# Patient Record
Sex: MALE | Race: WHITE | Employment: OTHER | URBAN - METROPOLITAN AREA
[De-identification: names, ages, dates, MRNs, and addresses within clinical notes are randomized per-mention and may not be internally consistent; named-entity substitution may affect disease eponyms.]

---

## 2017-01-26 ENCOUNTER — OFFICE VISIT (OUTPATIENT)
Dept: INTERNAL MEDICINE CLINIC | Age: 72
End: 2017-01-26

## 2017-01-26 VITALS
RESPIRATION RATE: 14 BRPM | OXYGEN SATURATION: 97 % | WEIGHT: 176 LBS | HEART RATE: 71 BPM | DIASTOLIC BLOOD PRESSURE: 79 MMHG | TEMPERATURE: 97.2 F | HEIGHT: 70 IN | SYSTOLIC BLOOD PRESSURE: 142 MMHG | BODY MASS INDEX: 25.2 KG/M2

## 2017-01-26 DIAGNOSIS — S86.112A GASTROCNEMIUS MUSCLE STRAIN, LEFT, INITIAL ENCOUNTER: Primary | ICD-10-CM

## 2017-01-26 NOTE — PROGRESS NOTES
Reviewed record In preparation for visit and have obtained necessary documentation. Advanced directive / living will on file. 1. Have you been to the ER, urgent care clinic or hospitalized since your last visit? No     2. Have you seen or consulted any other health care providers outside of the [address] [place] [address] since your last visit? Include any pap smears or colon screening.  No    Health Maintenance reviewed:

## 2017-01-26 NOTE — PROGRESS NOTES
HISTORY OF PRESENT ILLNESS  Audra Jacques is a 70 y.o. male. He is accompanied by his wife. HPI   He presents complaining of pain in the left posterior upper calf and knee. Pain is described as a constant ache that is 4/10 intensity. The pain began yesterday after spending most of the day repeatedly climbing up and down a 6 foot ladder. Pain kept him awake last night he denies any bruising or swelling. There is no tingling or numbness in the leg. He has noticed no rash or bruising. He has done nothing particular to treat this. 2 weeks ago felll off of a scaffold about 4 ft high. Landed on feet, but knees bucked and feel onto knees with legs bent and feet under legs. He had discomfort in both lower legs. This had improved until work on the ladder yesterday.       Patient Active Problem List   Diagnosis Code    Hypercholesterolemia E78.00    Hypertension, essential I10    S/P colonoscopy A80.044    ED (erectile dysfunction) N52.9    Actinic keratosis L57.0    COPD, moderate ([address] J44.9     Past Medical History   Diagnosis Date    Arthritis      knee    Cancer ([address]      skin    Chronic obstructive pulmonary disease ([address]     Hypercholesterolemia     Hypertension     Ill-defined condition      lipids    Smoking 1/22/2010     Stopped 04/2000      Past Surgical History   Procedure Laterality Date    Endoscopy, colon, diagnostic  07/16/2007     Dr Deacon Francisco polyp repeat 07/2010    Colorectal scrn; hi risk ind  1/12/2016          Hx orthopaedic Right 3/2016     arthroscopic; torn medial meniscus     Social History     Social History    Marital status:      Spouse name: N/A    Number of children: 3    Years of education: N/A     Occupational History    construction      Social History Main Topics    Smoking status: Former Smoker     Packs/day: 2.00     Years: 40.00     Quit date: 4/1/2000    Smokeless tobacco: Never Used    Alcohol use 0.0 oz/week     2 - 3 Cans of beer per week    Drug use: No    Sexual activity: Not Asked     Other Topics Concern    None     Social History Narrative     Family History   Problem Relation Age of Onset    Hypertension Mother     Thyroid Disease Mother     Kidney Disease Father     Cancer Father      skin    Diabetes Father     Cancer Paternal Aunt      lung    Cancer Paternal Uncle      skin     Allergies   Allergen Reactions    Macrobid [Nitrofurantoin Monohyd/M-Cryst] Unknown (comments)    Tetracycline Unknown (comments)     Current Outpatient Prescriptions   Medication Sig Dispense Refill    tadalafil (CIALIS) 5 mg tablet TAKE 1 TABLET AS NEEDED 30 Tab 5    pravastatin (PRAVACHOL) 40 mg tablet TAKE 1 TABLET DAILY 90 Tab 3    albuterol (PROVENTIL HFA, VENTOLIN HFA, PROAIR HFA) 90 mcg/actuation inhaler Take 2 Puffs by inhalation every four (4) hours as needed for Wheezing. 1 Inhaler 1    lisinopril (PRINIVIL, ZESTRIL) 20 mg tablet TAKE 1 TABLET DAILY 90 Tab 2             ROS  Visit Vitals    /79 (BP 1 Location: Right arm, BP Patient Position: Sitting)    Pulse 71    Temp 97.2 °F (36.2 °C) (Oral)    Resp 14    Ht 5' 10\" (1.778 m)    Wt 176 lb (79.8 kg)    SpO2 97%    BMI 25.25 kg/m2     Physical Exam   Constitutional: He is oriented to person, place, and time. He appears well-developed and well-nourished. HENT:   Head: Normocephalic and atraumatic. Cardiovascular:   Pulses:       Dorsalis pedis pulses are 2+ on the right side, and 2+ on the left side. Posterior tibial pulses are 2+ on the right side, and 2+ on the left side. Musculoskeletal:        Left knee: He exhibits normal range of motion, no swelling, no effusion and no erythema. Tenderness: popliteal fossa. Neurological: He is alert and oriented to person, place, and time. Skin: Skin is warm and dry. No rash noted. No erythema. Psychiatric: He has a normal mood and affect. His behavior is normal.   Nursing note and vitals reviewed.       ASSESSMENT and PLAN    ICD-10-CM ICD-9-CM    1. Gastrocnemius muscle strain, left, initial encounter S86.112A 844.8          Gastrocnemius muscle strain, left, initial encounter  Recommended ice packs 3 or 4 times a day for 3 days, then ice or heat as needed after that. Avoid ladders, scaffolds, and other strenuous activity with the leg for at least 2 weeks, longer if pain persists. Pain may take 4-6 weeks to resolve. Follow-up Disposition:  Return if symptoms worsen or fail to improve. I have discussed the diagnosis with the patient and the intended plan as seen in the above orders. Patient is in agreement. The patient has received an after-visit summary and questions were answered concerning future plans. I have discussed medication side effects and warnings with the patient as well.

## 2017-01-26 NOTE — PATIENT INSTRUCTIONS
Strain or Sprain: Care Instructions  Your Care Instructions    A strain happens when you overstretch, or pull, a muscle. A sprain occurs when you stretch or tear a ligament, the tough tissue that connects one bone to another. These problems can happen when you exercise or lift something or when you are in an accident. Rest and other home care can help strains and sprains heal.  The doctor has checked you carefully, but problems can develop later. If you notice any problems or new symptoms, get medical treatment right away. Follow-up care is a key part of your treatment and safety. Be sure to make and go to all appointments, and call your doctor if you are having problems. It's also a good idea to know your test results and keep a list of the medicines you take. How can you care for yourself at home? · If your doctor gave you a sling, splint, brace, or immobilizer, use it exactly as directed. · Rest the strained or sprained area, and follow your doctor's advice about when you can be active again. · Put ice or a cold pack on the sore area for 10 to 20 minutes at a time to stop swelling. Try this every 1 to 2 hours for 3 days (when you are awake) or until the swelling goes down. Put a thin cloth between the ice pack and your skin. Keep your splint or brace dry. · Prop up a sore arm or leg on a pillow when you ice it or anytime you sit or lie down. Try to keep it higher than the level of your heart. This will help reduce swelling. · Take pain medicines exactly as directed. ¨ If the doctor gave you a prescription medicine for pain, take it as prescribed. ¨ If you are not taking a prescription pain medicine, ask your doctor if you can take an over-the-counter medicine. · Do exercises as directed by your doctor or physical therapist.  · Return to your usual level of activity slowly. · Do not do anything that makes the pain worse. When should you call for help?   Call your doctor now or seek immediate medical care if:  · You have severe or increasing pain. · You have tingling, weakness, or numbness in the area. · The area turns cold or changes color. · Your cast or splint feels too tight. · You have symptoms of a blood clot, such as:  ¨ Pain in your calf, back of the knee, thigh, or groin. ¨ Redness and swelling in your leg or groin. · You cannot move the strained part of your body. Watch closely for changes in your health, and be sure to contact your doctor if:  · You do not get better as expected. Where can you learn more? Go to http://ronald-biju.info/. Enter J621 in the search box to learn more about \"Strain or Sprain: Care Instructions. \"  Current as of: May 23, 2016  Content Version: 11.1  © 9023-1974 [employer]if. Care instructions adapted under license by [employer] (which disclaims liability or warranty for this information). If you have questions about a medical condition or this instruction, always ask your healthcare professional. [address] any warranty or liability for your use of this information.

## 2017-01-26 NOTE — MR AVS SNAPSHOT
Visit Information Date & Time Provider Department Dept. Phone Encounter #  
 1/26/2017  2:30 PM MD Valdez Marcelo [phone] [id number] Follow-up Instructions Return if symptoms worsen or fail to improve. Your Appointments 4/11/2017  8:15 AM  
LAB with LAB [facility] Valdez [facility] Appt Note: fasting lab  
 [address] [address] [phone]  
  
   
 [address] 4/18/2017  8:15 AM  
ROUTINE CARE with MD Valdez Marcelogle [facility]) Appt Note: HTN, Chol, COPD Â Fasting lab one week prior. [address] [address] [phone]  
  
   
 8 [facility] [address] Upcoming Health Maintenance Date Due  
 GLAUCOMA SCREENING Q2Y 10/13/2016 MEDICARE YEARLY EXAM 10/13/2016 DTaP/Tdap/Td series (1 - Tdap) 9/7/2022* COLONOSCOPY 1/12/2021 *Topic was postponed. The date shown is not the original due date. Allergies as of 1/26/2017  Review Complete On: 1/26/2017 By: Katerine Aguilar MD  
  
 Severity Noted Reaction Type Reactions Macrobid [Nitrofurantoin Monohyd/m-cryst]  01/22/2010    Unknown (comments) Tetracycline  01/22/2010    Unknown (comments) Current Immunizations  Reviewed on 1/26/2017 Name Date Influenza High Dose Vaccine PF 10/11/2016, 10/13/2015, 9/17/2014 Influenza Vaccine 12/30/2013 Influenza Vaccine Split 10/8/2012 Influenza Vaccine Whole 10/24/2011 Pneumococcal Conjugate (PCV-13) 3/31/2015 Pneumococcal Vaccine (Unspecified Type) 10/24/2011 TD Vaccine 9/7/2012, 6/1/2007 Zoster 5/28/2009 Reviewed by Ben Lopez LPN on 6/62/6400 at  2:29 PM  
You Were Diagnosed With   
  
 Codes Comments  Gastrocnemius muscle strain, left, initial encounter    -  Primary ICD-10-CM: M73.516C ICD-9-CM: 566. 8 Vitals BP Pulse Temp Resp Height(growth percentile) Weight(growth percentile) 142/79 (BP 1 Location: Right arm, BP Patient Position: Sitting) 71 97.2 °F (36.2 °C) (Oral) 14 5' 10\" (1.778 m) 176 lb (79.8 kg) SpO2 BMI Smoking Status 97% 25.25 kg/m2 Former Smoker BMI and BSA Data Body Mass Index Body Surface Area  
 25.25 kg/m 2 1.99 m 2 Preferred Pharmacy Pharmacy Name Phone [address] [facility] [phone] Your Updated Medication List  
  
   
This list is accurate as of: 1/26/17  3:04 PM.  Always use your most recent med list.  
  
  
  
  
 albuterol 90 mcg/actuation inhaler Commonly known as:  PROVENTIL HFA, VENTOLIN HFA, PROAIR HFA Take 2 Puffs by inhalation every four (4) hours as needed for Wheezing. lisinopril 20 mg tablet Commonly known as:  PRINIVIL, ZESTRIL  
TAKE 1 TABLET DAILY pravastatin 40 mg tablet Commonly known as:  PRAVACHOL  
TAKE 1 TABLET DAILY  
  
 tadalafil 5 mg tablet Commonly known as:  CIALIS  
TAKE 1 TABLET AS NEEDED Follow-up Instructions Return if symptoms worsen or fail to improve. Patient Instructions Strain or Sprain: Care Instructions Your Care Instructions A strain happens when you overstretch, or pull, a muscle. A sprain occurs when you stretch or tear a ligament, the tough tissue that connects one bone to another. These problems can happen when you exercise or lift something or when you are in an accident. Rest and other home care can help strains and sprains heal. 
The doctor has checked you carefully, but problems can develop later. If you notice any problems or new symptoms, get medical treatment right away. Follow-up care is a key part of your treatment and safety.  Be sure to make and go to all appointments, and call your doctor if you are having problems. It's also a good idea to know your test results and keep a list of the medicines you take. How can you care for yourself at home? · If your doctor gave you a sling, splint, brace, or immobilizer, use it exactly as directed. · Rest the strained or sprained area, and follow your doctor's advice about when you can be active again. · Put ice or a cold pack on the sore area for 10 to 20 minutes at a time to stop swelling. Try this every 1 to 2 hours for 3 days (when you are awake) or until the swelling goes down. Put a thin cloth between the ice pack and your skin. Keep your splint or brace dry. · Prop up a sore arm or leg on a pillow when you ice it or anytime you sit or lie down. Try to keep it higher than the level of your heart. This will help reduce swelling. · Take pain medicines exactly as directed. ¨ If the doctor gave you a prescription medicine for pain, take it as prescribed. ¨ If you are not taking a prescription pain medicine, ask your doctor if you can take an over-the-counter medicine. · Do exercises as directed by your doctor or physical therapist. 
· Return to your usual level of activity slowly. · Do not do anything that makes the pain worse. When should you call for help? Call your doctor now or seek immediate medical care if: 
· You have severe or increasing pain. · You have tingling, weakness, or numbness in the area. · The area turns cold or changes color. · Your cast or splint feels too tight. · You have symptoms of a blood clot, such as: 
¨ Pain in your calf, back of the knee, thigh, or groin. ¨ Redness and swelling in your leg or groin. · You cannot move the strained part of your body. Watch closely for changes in your health, and be sure to contact your doctor if: 
· You do not get better as expected. Where can you learn more? Go to http://ronald-biju.info/.  
Enter O434 in the search box to learn more about \"Strain or Sprain: Care Instructions. \" Current as of: May 23, 2016 Content Version: 11.1 © 8629-8570 Lingua.ly, [employer]. Care instructions adapted under license by Quintiles (which disclaims liability or warranty for this information). If you have questions about a medical condition or this instruction, always ask your healthcare professional. [address] any warranty or liability for your use of this information. Introducing [facility] & HEALTH SERVICES! Dear Shae Drop: 
Thank you for requesting a Five minutes account. Our records indicate that you already have an active Five minutes account. You can access your account anytime at [email or web address] Weekly Flower Delivery Did you know that you can access your hospital and ER discharge instructions at any time in Five minutes? You can also review all of your test results from your hospital stay or ER visit. Additional Information If you have questions, please visit the Frequently Asked Questions section of the Five minutes website at [email or web address] Weekly [email or web address] [email or web address]/. Remember, Five minutes is NOT to be used for urgent needs. For medical emergencies, dial 911. Now available from your iPhone and Android! Please provide this summary of care documentation to your next provider. Your primary care clinician is listed as Audria Reasons. If you have any questions after today's visit, please call [phone].

## 2017-02-21 ENCOUNTER — HOSPITAL ENCOUNTER (OUTPATIENT)
Dept: LAB | Age: 72
Discharge: HOME | End: 2017-02-21
Payer: MEDICARE

## 2017-02-21 ENCOUNTER — OFFICE VISIT (OUTPATIENT)
Dept: INTERNAL MEDICINE CLINIC | Age: 72
End: 2017-02-21

## 2017-02-21 VITALS
HEIGHT: 70 IN | TEMPERATURE: 96.7 F | DIASTOLIC BLOOD PRESSURE: 79 MMHG | BODY MASS INDEX: 24.91 KG/M2 | WEIGHT: 174 LBS | OXYGEN SATURATION: 96 % | SYSTOLIC BLOOD PRESSURE: 122 MMHG | RESPIRATION RATE: 14 BRPM | HEART RATE: 69 BPM

## 2017-02-21 DIAGNOSIS — M79.605 PAIN IN BOTH LOWER EXTREMITIES: Primary | ICD-10-CM

## 2017-02-21 DIAGNOSIS — M79.604 PAIN IN BOTH LOWER EXTREMITIES: Primary | ICD-10-CM

## 2017-02-21 DIAGNOSIS — I10 HYPERTENSION, ESSENTIAL: ICD-10-CM

## 2017-02-21 DIAGNOSIS — E55.9 VITAMIN D DEFICIENCY: ICD-10-CM

## 2017-02-21 DIAGNOSIS — Z79.899 ON STATIN THERAPY: ICD-10-CM

## 2017-02-21 PROCEDURE — 86140 C-REACTIVE PROTEIN: CPT

## 2017-02-21 PROCEDURE — 82306 VITAMIN D 25 HYDROXY: CPT

## 2017-02-21 PROCEDURE — 82550 ASSAY OF CK (CPK): CPT

## 2017-02-21 PROCEDURE — 85651 RBC SED RATE NONAUTOMATED: CPT

## 2017-02-21 PROCEDURE — 80048 BASIC METABOLIC PNL TOTAL CA: CPT

## 2017-02-21 PROCEDURE — 36415 COLL VENOUS BLD VENIPUNCTURE: CPT

## 2017-02-21 PROCEDURE — 83735 ASSAY OF MAGNESIUM: CPT

## 2017-02-21 NOTE — MR AVS SNAPSHOT
Visit Information Date & Time Provider Department Dept. Phone Encounter #  
 2/21/2017  3:30 PM [facility] [phone] [id number] Follow-up Instructions Return if symptoms worsen or fail to improve. Your Appointments 4/11/2017  8:15 AM  
LAB with LAB [facility] Lizbeth Hernandez [facility]) Appt Note: fasting lab  
 [address] [address] [phone]  
  
   
 [address] 4/18/2017  8:15 AM  
ROUTINE CARE with MD Lizbeth Monge [facility]) Appt Note: HTN, Chol, COPD Â Fasting lab one week prior. [address] [facility] [address] [address] [phone]  
  
   
 [address] Upcoming Health Maintenance Date Due  
 MEDICARE YEARLY EXAM 10/13/2016 DTaP/Tdap/Td series (1 - Tdap) 9/7/2022* GLAUCOMA SCREENING Q2Y 8/15/2018 COLONOSCOPY 1/12/2021 *Topic was postponed. The date shown is not the original due date. Allergies as of 2/21/2017  Review Complete On: 2/21/2017 By: Yady Ball MD  
  
 Severity Noted Reaction Type Reactions Macrobid [Nitrofurantoin Monohyd/m-cryst]  01/22/2010    Unknown (comments) Tetracycline  01/22/2010    Unknown (comments) Current Immunizations  Reviewed on 2/21/2017 Name Date Influenza High Dose Vaccine PF 10/11/2016, 10/13/2015, 9/17/2014 Influenza Vaccine 12/30/2013 Influenza Vaccine Split 10/8/2012 Influenza Vaccine Whole 10/24/2011 Pneumococcal Conjugate (PCV-13) 3/31/2015 Pneumococcal Vaccine (Unspecified Type) 10/24/2011 TD Vaccine 9/7/2012, 6/1/2007 Zoster 5/28/2009 Reviewed by Damion Musa LPN on 0/43/6431 at  3:35 PM  
You Were Diagnosed With   
  
 Codes Comments Pain in both lower extremities    -  Primary ICD-10-CM: M79.604, M79.605 ICD-9-CM: 729.5 On statin therapy     ICD-10-CM: Z79.899 ICD-9-CM: V58.69 Hypertension, essential     ICD-10-CM: I10 
ICD-9-CM: 401.9 Vitamin D deficiency     ICD-10-CM: E55.9 ICD-9-CM: 268.9 Vitals BP Pulse Temp Resp Height(growth percentile) Weight(growth percentile) 122/79 (BP 1 Location: Left arm, BP Patient Position: Sitting) 69 96.7 °F (35.9 °C) (Oral) 14 5' 10\" (1.778 m) 174 lb (78.9 kg) SpO2 BMI Smoking Status 96% 24.97 kg/m2 Former Smoker BMI and BSA Data Body Mass Index Body Surface Area 24.97 kg/m 2 1.97 m 2 Preferred Pharmacy Pharmacy Name Phone [address] [facility] [phone] Your Updated Medication List  
  
   
This list is accurate as of: 2/21/17  4:14 PM.  Always use your most recent med list.  
  
  
  
  
 albuterol 90 mcg/actuation inhaler Commonly known as:  PROVENTIL HFA, VENTOLIN HFA, PROAIR HFA Take 2 Puffs by inhalation every four (4) hours as needed for Wheezing. lisinopril 20 mg tablet Commonly known as:  PRINIVIL, ZESTRIL  
TAKE 1 TABLET DAILY pravastatin 40 mg tablet Commonly known as:  PRAVACHOL  
TAKE 1 TABLET DAILY  
  
 tadalafil 5 mg tablet Commonly known as:  CIALIS  
TAKE 1 TABLET AS NEEDED We Performed the Following C REACTIVE PROTEIN, QT [95053 CPT(R)] CK Z3244353 CPT(R)] MAGNESIUM R307275 CPT(R)] METABOLIC PANEL, BASIC [65454 CPT(R)] SED RATE (ESR) [id number] CPT(R)] VITAMIN D, 25 HYDROXY [id number] CPT(R)] Follow-up Instructions Return if symptoms worsen or fail to improve. Patient Instructions Temprorarily stop pravastatin Let me know how pain is in 2 weeks. My Chart Musculoskeletal Pain: Care Instructions Your Care Instructions Different problems with the bones, muscles, nerves, ligaments, and tendons in the body can cause pain. One or more areas of your body may ache or burn. Or they may feel tired, stiff, or sore. The medical term for this type of pain is musculoskeletal pain. It can have many different causes. Sometimes the pain is caused by an injury such as a strain or sprain. Or you might have pain from using one part of your body in the same way over and over again. This is called overuse. In some cases, the cause of the pain is another health problem such as arthritis or fibromyalgia. The doctor will examine you and ask you questions about your health to help find the cause of your pain. Blood tests or imaging tests like an X-ray may also be helpful. But sometimes doctors can't find a cause of the pain. Treatment depends on your symptoms and the cause of the pain, if known. The doctor has checked you carefully, but problems can develop later. If you notice any problems or new symptoms, get medical treatment right away. Follow-up care is a key part of your treatment and safety. Be sure to make and go to all appointments, and call your doctor if you are having problems. It's also a good idea to know your test results and keep a list of the medicines you take. How can you care for yourself at home? · Rest until you feel better. · Do not do anything that makes the pain worse. Return to exercise gradually if you feel better and your doctor says it's okay. · Be safe with medicines. Read and follow all instructions on the label. ¨ If the doctor gave you a prescription medicine for pain, take it as prescribed. ¨ If you are not taking a prescription pain medicine, ask your doctor if you can take an over-the-counter medicine. · Put ice or a cold pack on the area for 10 to 20 minutes at a time to ease pain. Put a thin cloth between the ice and your skin. When should you call for help? Call your doctor now or seek immediate medical care if: 
· You have new pain, or your pain gets worse. · You have new symptoms such as a fever, a rash, or chills. Watch closely for changes in your health, and be sure to contact your doctor if: 
· You do not get better as expected. Where can you learn more? Go to http://ronald-biju.info/. Enter K417 in the search box to learn more about \"Musculoskeletal Pain: Care Instructions. \" Current as of: February 19, 2016 Content Version: 11.1 © 1102-4243 [employer]aming [employer]. Care instructions adapted under license by LiveNinja (which disclaims liability or warranty for this information). If you have questions about a medical condition or this instruction, always ask your healthcare professional. [address] any warranty or liability for your use of this information. Introducing [facility] & HEALTH SERVICES! Dear Juan Medina: 
Thank you for requesting a Cognovant account. Our records indicate that you already have an active Cognovant account. You can access your account anytime at [email or web address] Did you know that you can access your hospital and ER discharge instructions at any time in Cognovant? You can also review all of your test results from your hospital stay or ER visit. Additional Information If you have questions, please visit the Frequently Asked Questions section of the Cognovant website at [email or web address] Remember, Cognovant is NOT to be used for urgent needs. For medical emergencies, dial 911. Now available from your iPhone and Android! Please provide this summary of care documentation to your next provider. Your primary care clinician is listed as Florence Villalta. If you have any questions after today's visit, please call [phone].

## 2017-02-21 NOTE — PROGRESS NOTES
HISTORY OF PRESENT ILLNESS  Bulmaro Montgomery is a 70 y.o. male. HPI Complains of aching pain in ant thighs and post lower legs for 2 weeks. Has been more active, pushing wheel [place], kneeling, bending doing carpentry and gardening. Usually pain lasts only a few days when he becomes more active in the spring. This is longer than he has experienced in the past. He feels stiff after sittng for 90 min. Legs do not feel weak. ASA helps, but only took it a couple of times. Has not taken it in a week. Numbness in toes occurs rarely. He was seen on January 26 here, complaining of pain in the left posterior upper calf and knee. This occurred after repeatedly climbing up and down a 6 foot ladder. 2 weeks prior to that he had fallen off of a 4 foot high scaffold landing on his feet with knees buckled,  falling onto the knees with his legs and feet bent under him. However all of the pain from those incidents had resolved.       Patient Active Problem List   Diagnosis Code    Hypercholesterolemia E78.00    Hypertension, essential I10    S/P colonoscopy O48.987    ED (erectile dysfunction) N52.9    Actinic keratosis L57.0    COPD, moderate ([address] J44.9     Past Medical History   Diagnosis Date    Arthritis      knee    Cancer ([address]      skin    Chronic obstructive pulmonary disease ([address]     Hypercholesterolemia     Hypertension     Ill-defined condition      lipids    Smoking 1/22/2010     Stopped 04/2000      Past Surgical History   Procedure Laterality Date    Endoscopy, colon, diagnostic  07/16/2007     Dr Mecca Cortés polyp repeat 07/2010    Colorectal scrn; hi risk ind  1/12/2016          Hx orthopaedic Right 3/2016     arthroscopic; torn medial meniscus     Social History     Social History    Marital status:      Spouse name: N/A    Number of children: 3    Years of education: N/A     Occupational History    construction      Social History Main Topics    Smoking status: Former Smoker Packs/day: 2.00     Years: 40.00     Quit date: 4/1/2000    Smokeless tobacco: Never Used    Alcohol use 0.0 oz/week     2 - 3 Cans of beer per week    Drug use: No    Sexual activity: Not Asked     Other Topics Concern    None     Social History Narrative     Family History   Problem Relation Age of Onset    Hypertension Mother     Thyroid Disease Mother     Kidney Disease Father     Cancer Father      skin    Diabetes Father     Cancer Paternal Aunt      lung    Cancer Paternal Uncle      skin     Allergies   Allergen Reactions    Macrobid [Nitrofurantoin Monohyd/M-Cryst] Unknown (comments)    Tetracycline Unknown (comments)     Current Outpatient Prescriptions   Medication Sig Dispense Refill    tadalafil (CIALIS) 5 mg tablet TAKE 1 TABLET AS NEEDED 30 Tab 5    pravastatin (PRAVACHOL) 40 mg tablet TAKE 1 TABLET DAILY 90 Tab 3    albuterol (PROVENTIL HFA, VENTOLIN HFA, PROAIR HFA) 90 mcg/actuation inhaler Take 2 Puffs by inhalation every four (4) hours as needed for Wheezing. 1 Inhaler 1    lisinopril (PRINIVIL, ZESTRIL) 20 mg tablet TAKE 1 TABLET DAILY 90 Tab 2             ROS       Visit Vitals    /79 (BP 1 Location: Left arm, BP Patient Position: Sitting)    Pulse 69    Temp 96.7 °F (35.9 °C) (Oral)    Resp 14    Ht 5' 10\" (1.778 m)    Wt 174 lb (78.9 kg)    SpO2 96%    BMI 24.97 kg/m2     Physical Exam   Constitutional: He is oriented to person, place, and time. He appears well-developed and well-nourished. HENT:   Head: Normocephalic and atraumatic. Musculoskeletal:        Right upper leg: He exhibits no tenderness and no swelling. Left upper leg: He exhibits no tenderness and no swelling. Right lower leg: He exhibits no tenderness and no swelling. Left lower leg: He exhibits no tenderness and no swelling. Neurological: He is alert and oriented to person, place, and time.  Gait normal.   Reflex Scores:       Patellar reflexes are 2+ on the right side and 2+ on the left side. Achilles reflexes are 1+ on the right side and 1+ on the left side. Motor strength symmetric and 5/5 to dorsiflexion/plantar flexion of ankle and great toe, flexion/extension of knee, flexion of hip     Skin: Skin is warm, dry and intact. No rash noted. Nursing note and vitals reviewed. ASSESSMENT and PLAN    ICD-10-CM ICD-9-CM    1. Pain in both lower extremities M79.604 729.5 SED RATE (ESR)    M79.605  C REACTIVE PROTEIN, QT   2. On statin therapy Z79.899 V58.69 CK   3. Hypertension, essential I10 401.9 MAGNESIUM      METABOLIC PANEL, BASIC   4. Vitamin D deficiency E55.9 268.9 VITAMIN D, 25 HYDROXY         Pain in both lower extremities  -     SED RATE (ESR)  -     C REACTIVE PROTEIN, QT    On statin therapy  Temprorarily stop pravastatin  Let me know how pain is in 2 weeks in  My Chart  -     CK    Hypertension, essential  -     MAGNESIUM  -     METABOLIC PANEL, BASIC    Vitamin D deficiency  -     VITAMIN D, 25 HYDROXY      Follow-up Disposition:  Return if symptoms worsen or fail to improve. I have discussed the diagnosis with the patient and the intended plan as seen in the above orders. Patient is in agreement. The patient has received an after-visit summary and questions were answered concerning future plans. I have discussed medication side effects and warnings with the patient as well.

## 2017-02-21 NOTE — PATIENT INSTRUCTIONS
Temprorarily stop pravastatin  Let me know how pain is in 2 weeks. My Chart       Musculoskeletal Pain: Care Instructions  Your Care Instructions  Different problems with the bones, muscles, nerves, ligaments, and tendons in the body can cause pain. One or more areas of your body may ache or burn. Or they may feel tired, stiff, or sore. The medical term for this type of pain is musculoskeletal pain. It can have many different causes. Sometimes the pain is caused by an injury such as a strain or sprain. Or you might have pain from using one part of your body in the same way over and over again. This is called overuse. In some cases, the cause of the pain is another health problem such as arthritis or fibromyalgia. The doctor will examine you and ask you questions about your health to help find the cause of your pain. Blood tests or imaging tests like an X-ray may also be helpful. But sometimes doctors can't find a cause of the pain. Treatment depends on your symptoms and the cause of the pain, if known. The doctor has checked you carefully, but problems can develop later. If you notice any problems or new symptoms, get medical treatment right away. Follow-up care is a key part of your treatment and safety. Be sure to make and go to all appointments, and call your doctor if you are having problems. It's also a good idea to know your test results and keep a list of the medicines you take. How can you care for yourself at home? · Rest until you feel better. · Do not do anything that makes the pain worse. Return to exercise gradually if you feel better and your doctor says it's okay. · Be safe with medicines. Read and follow all instructions on the label. ¨ If the doctor gave you a prescription medicine for pain, take it as prescribed. ¨ If you are not taking a prescription pain medicine, ask your doctor if you can take an over-the-counter medicine.   · Put ice or a cold pack on the area for 10 to 20 minutes at a time to ease pain. Put a thin cloth between the ice and your skin. When should you call for help? Call your doctor now or seek immediate medical care if:  · You have new pain, or your pain gets worse. · You have new symptoms such as a fever, a rash, or chills. Watch closely for changes in your health, and be sure to contact your doctor if:  · You do not get better as expected. Where can you learn more? Go to http://ronald-biju.info/. Enter L155 in the search box to learn more about \"Musculoskeletal Pain: Care Instructions. \"  Current as of: February 19, 2016  Content Version: 11.1  © 3907-2037 [employer]. Care instructions adapted under license by [employer] (which disclaims liability or warranty for this information). If you have questions about a medical condition or this instruction, always ask your healthcare professional. [address] any warranty or liability for your use of this information.

## 2017-02-21 NOTE — PROGRESS NOTES
Reviewed record In preparation for visit and have obtained necessary documentation. Advanced directive / living will on file. 1. Have you been to the ER, urgent care clinic or hospitalized since your last visit? No     2. Have you seen or consulted any other health care providers outside of the [facility] since your last visit? Include any pap smears or colon screening.  Dr René Diaz Maintenance reviewed:

## 2017-02-22 LAB
25(OH)D3+25(OH)D2 SERPL-MCNC: 32.9 NG/ML (ref 30–100)
BUN SERPL-MCNC: 15 MG/DL (ref 8–27)
BUN/CREAT SERPL: 13 (ref 10–22)
CALCIUM SERPL-MCNC: 9.6 MG/DL (ref 8.6–10.2)
CHLORIDE SERPL-SCNC: 102 MMOL/L (ref 96–106)
CK SERPL-CCNC: 236 U/L (ref 24–204)
CO2 SERPL-SCNC: 23 MMOL/L (ref 18–29)
CREAT SERPL-MCNC: 1.12 MG/DL (ref 0.76–1.27)
CRP SERPL-MCNC: 0.4 MG/L (ref 0–4.9)
ERYTHROCYTE [SEDIMENTATION RATE] IN BLOOD BY WESTERGREN METHOD: 18 MM/HR (ref 0–30)
GLUCOSE SERPL-MCNC: 85 MG/DL (ref 65–99)
MAGNESIUM SERPL-MCNC: 2.2 MG/DL (ref 1.6–2.3)
POTASSIUM SERPL-SCNC: 4.2 MMOL/L (ref 3.5–5.2)
SODIUM SERPL-SCNC: 139 MMOL/L (ref 134–144)

## 2017-04-11 ENCOUNTER — HOSPITAL ENCOUNTER (OUTPATIENT)
Dept: LAB | Age: 72
Discharge: HOME | End: 2017-04-11
Payer: MEDICARE

## 2017-04-11 ENCOUNTER — APPOINTMENT (OUTPATIENT)
Dept: INTERNAL MEDICINE CLINIC | Age: 72
End: 2017-04-11

## 2017-04-11 DIAGNOSIS — E78.00 HYPERCHOLESTEROLEMIA: ICD-10-CM

## 2017-04-11 DIAGNOSIS — J44.9 COPD, MODERATE (HCC): ICD-10-CM

## 2017-04-11 PROCEDURE — 80053 COMPREHEN METABOLIC PANEL: CPT

## 2017-04-11 PROCEDURE — 36415 COLL VENOUS BLD VENIPUNCTURE: CPT

## 2017-04-11 PROCEDURE — 80061 LIPID PANEL: CPT

## 2017-04-11 PROCEDURE — 85025 COMPLETE CBC W/AUTO DIFF WBC: CPT

## 2017-04-12 LAB
ALBUMIN SERPL-MCNC: 3.9 G/DL (ref 3.5–4.8)
ALBUMIN/GLOB SERPL: 1.4 {RATIO} (ref 1.2–2.2)
ALP SERPL-CCNC: 107 IU/L (ref 39–117)
ALT SERPL-CCNC: 16 IU/L (ref 0–44)
AST SERPL-CCNC: 24 IU/L (ref 0–40)
BASOPHILS # BLD AUTO: 0.1 X10E3/UL (ref 0–0.2)
BASOPHILS NFR BLD AUTO: 1 %
BILIRUB SERPL-MCNC: 0.6 MG/DL (ref 0–1.2)
BUN SERPL-MCNC: 15 MG/DL (ref 8–27)
BUN/CREAT SERPL: 14 (ref 10–24)
CALCIUM SERPL-MCNC: 9.1 MG/DL (ref 8.6–10.2)
CHLORIDE SERPL-SCNC: 103 MMOL/L (ref 96–106)
CHOLEST SERPL-MCNC: 189 MG/DL (ref 100–199)
CO2 SERPL-SCNC: 21 MMOL/L (ref 18–29)
CREAT SERPL-MCNC: 1.06 MG/DL (ref 0.76–1.27)
EOSINOPHIL # BLD AUTO: 0.3 X10E3/UL (ref 0–0.4)
EOSINOPHIL NFR BLD AUTO: 5 %
ERYTHROCYTE [DISTWIDTH] IN BLOOD BY AUTOMATED COUNT: 14.2 % (ref 12.3–15.4)
GLOBULIN SER CALC-MCNC: 2.7 G/DL (ref 1.5–4.5)
GLUCOSE SERPL-MCNC: 91 MG/DL (ref 65–99)
HCT VFR BLD AUTO: 42.9 % (ref 37.5–51)
HDLC SERPL-MCNC: 47 MG/DL
HGB BLD-MCNC: 14 G/DL (ref 12.6–17.7)
IMM GRANULOCYTES # BLD: 0 X10E3/UL (ref 0–0.1)
IMM GRANULOCYTES NFR BLD: 0 %
INTERPRETATION, 910389: NORMAL
LDLC SERPL CALC-MCNC: 127 MG/DL (ref 0–99)
LYMPHOCYTES # BLD AUTO: 1.7 X10E3/UL (ref 0.7–3.1)
LYMPHOCYTES NFR BLD AUTO: 28 %
MCH RBC QN AUTO: 30 PG (ref 26.6–33)
MCHC RBC AUTO-ENTMCNC: 32.6 G/DL (ref 31.5–35.7)
MCV RBC AUTO: 92 FL (ref 79–97)
MONOCYTES # BLD AUTO: 0.5 X10E3/UL (ref 0.1–0.9)
MONOCYTES NFR BLD AUTO: 9 %
NEUTROPHILS # BLD AUTO: 3.6 X10E3/UL (ref 1.4–7)
NEUTROPHILS NFR BLD AUTO: 57 %
PLATELET # BLD AUTO: 245 X10E3/UL (ref 150–379)
POTASSIUM SERPL-SCNC: 4.4 MMOL/L (ref 3.5–5.2)
PROT SERPL-MCNC: 6.6 G/DL (ref 6–8.5)
RBC # BLD AUTO: 4.66 X10E6/UL (ref 4.14–5.8)
SODIUM SERPL-SCNC: 140 MMOL/L (ref 134–144)
TRIGL SERPL-MCNC: 73 MG/DL (ref 0–149)
VLDLC SERPL CALC-MCNC: 15 MG/DL (ref 5–40)
WBC # BLD AUTO: 6.2 X10E3/UL (ref 3.4–10.8)

## 2017-04-18 ENCOUNTER — OFFICE VISIT (OUTPATIENT)
Dept: INTERNAL MEDICINE CLINIC | Age: 72
End: 2017-04-18

## 2017-04-18 VITALS
HEIGHT: 70 IN | HEART RATE: 61 BPM | DIASTOLIC BLOOD PRESSURE: 82 MMHG | SYSTOLIC BLOOD PRESSURE: 124 MMHG | OXYGEN SATURATION: 97 % | RESPIRATION RATE: 16 BRPM | TEMPERATURE: 97.1 F | BODY MASS INDEX: 24.91 KG/M2 | WEIGHT: 174 LBS

## 2017-04-18 DIAGNOSIS — Z71.89 ADVANCE DIRECTIVE DISCUSSED WITH PATIENT: ICD-10-CM

## 2017-04-18 DIAGNOSIS — R29.898 WEAKNESS OF LEFT LEG: ICD-10-CM

## 2017-04-18 DIAGNOSIS — Z00.00 MEDICARE ANNUAL WELLNESS VISIT, SUBSEQUENT: ICD-10-CM

## 2017-04-18 DIAGNOSIS — I10 HYPERTENSION, ESSENTIAL: ICD-10-CM

## 2017-04-18 DIAGNOSIS — J44.9 COPD, MODERATE (HCC): Primary | ICD-10-CM

## 2017-04-18 DIAGNOSIS — M17.0 PRIMARY OSTEOARTHRITIS OF BOTH KNEES: ICD-10-CM

## 2017-04-18 DIAGNOSIS — E78.00 HYPERCHOLESTEROLEMIA: ICD-10-CM

## 2017-04-18 NOTE — PROGRESS NOTES
Reviewed record In preparation for visit and have obtained necessary documentation    1. Have you been to the ER, urgent care clinic since your last visit? Hospitalized since your last visit? NO  2. Have you seen or consulted any other health care providers outside of the [address] [place] [address] since your last visit? Include any pap smears or colon screening.  NO    Patient has advance directive on file

## 2017-04-18 NOTE — PROGRESS NOTES
HISTORY OF PRESENT ILLNESS  Ronald Oliva is a 70 y.o. male. HPI   He presents for follow up of hypertension and hyperlipidemia. Diet and Lifestyle: generally follows a low fat low cholesterol diet, generally follows a low sodium diet, exercises sporadically, nonsmoker   Medication compliance: compliant most of the time  Medication side effects: none  Home BP Monitoring: is not measured at home. Cardiovascular ROS:  He denies palpitations, orthopnea, exertional chest pressure/discomfort, claudication, lower extremity edema, dyspnea on exertion, dizziness    He is being seen for follow up of COPD, not currently in exacerbation. taking medications as instructed, no medication side effects noted, no significant ongoing wheezing or shortness of breath, using bronchodilator MDI less than twice a week.    Uses rescue inhaler:0  times /week        Patient Active Problem List   Diagnosis Code    Hypercholesterolemia E78.00    Hypertension, essential I10    S/P colonoscopy P87.531    ED (erectile dysfunction) N52.9    Actinic keratosis L57.0    COPD, moderate ([address] J44.9     Past Medical History:   Diagnosis Date    Arthritis     knee    Cancer [address]     skin    Chronic obstructive pulmonary disease ([facility]     Hypercholesterolemia     Hypertension     Ill-defined condition     lipids    Smoking 1/22/2010    Stopped 04/2000      Past Surgical History:   Procedure Laterality Date    COLORECTAL SCRN; HI RISK IND  1/12/2016         ENDOSCOPY, COLON, DIAGNOSTIC  07/16/2007    Dr Christensen Has polyp repeat 07/2010    HX ORTHOPAEDIC Right 3/2016    arthroscopic; torn medial meniscus     Social History     Social History    Marital status:      Spouse name: N/A    Number of children: 3    Years of education: N/A     Occupational History    construction      Social History Main Topics    Smoking status: Former Smoker     Packs/day: 2.00     Years: 40.00     Quit date: 4/1/2000    Smokeless tobacco: Never Used    Alcohol use 0.0 oz/week     2 - 3 Cans of beer per week    Drug use: No    Sexual activity: Not Asked     Other Topics Concern    None     Social History Narrative     Family History   Problem Relation Age of Onset    Hypertension Mother     Thyroid Disease Mother     Kidney Disease Father     Cancer Father      skin    Diabetes Father     Cancer Paternal Aunt      lung    Cancer Paternal Uncle      skin     Allergies   Allergen Reactions    Macrobid [Nitrofurantoin Monohyd/M-Cryst] Unknown (comments)    Pravastatin Myalgia    Tetracycline Unknown (comments)     Current Outpatient Prescriptions   Medication Sig Dispense Refill    lisinopril (PRINIVIL, ZESTRIL) 20 mg tablet TAKE 1 TABLET DAILY 90 Tab 3    tadalafil (CIALIS) 5 mg tablet TAKE 1 TABLET AS NEEDED 30 Tab 5    pravastatin (PRAVACHOL) 40 mg tablet TAKE 1 TABLET DAILY 90 Tab 3    albuterol (PROVENTIL HFA, VENTOLIN HFA, PROAIR HFA) 90 mcg/actuation inhaler Take 2 Puffs by inhalation every four (4) hours as needed for Wheezing. 1 Inhaler 1       Review of Systems   Constitutional: Negative for malaise/fatigue and weight loss. Gastrointestinal: Positive for heartburn (rarely). Negative for constipation and diarrhea. Musculoskeletal: Positive for joint pain. Negative for back pain. Left leg feels weak. Had cortisone shot in left knee. No pain when walking, but gets tired is pushing lawnmower. Neurological: Negative for dizziness, tingling and focal weakness. Visit Vitals    /82 (BP 1 Location: Right arm, BP Patient Position: Sitting)    Pulse 61    Temp 97.1 °F (36.2 °C) (Oral)    Resp 16    Ht 5' 10\" (1.778 m)    Wt 174 lb (78.9 kg)    SpO2 97%    BMI 24.97 kg/m2     Physical Exam   Constitutional: He is oriented to person, place, and time. He appears well-developed and well-nourished. HENT:   Head: Normocephalic and atraumatic.    Eyes: Conjunctivae are normal. Pupils are equal, round, and reactive to light.   Neck: Neck supple. Carotid bruit is not present. No thyromegaly present. Cardiovascular: Normal rate, regular rhythm and normal heart sounds. PMI is not displaced. Exam reveals no gallop. No murmur heard. Pulses:       Dorsalis pedis pulses are 2+ on the right side, and 2+ on the left side. Posterior tibial pulses are 2+ on the right side, and 2+ on the left side. Pulmonary/Chest: Effort normal. He has no wheezes. He has no rhonchi. He has no rales. Abdominal: Soft. Normal appearance. He exhibits no abdominal bruit and no mass. There is no hepatosplenomegaly. There is no tenderness. Musculoskeletal: He exhibits no edema. Lymphadenopathy:     He has no cervical adenopathy. Right: No supraclavicular adenopathy present. Left: No inguinal and no supraclavicular adenopathy present. Neurological: He is alert and oriented to person, place, and time. No sensory deficit. Skin: Skin is warm, dry and intact. No rash noted. Psychiatric: He has a normal mood and affect. His behavior is normal.   Nursing note and vitals reviewed. Results for orders placed or performed in visit on 36/19/38   METABOLIC PANEL, COMPREHENSIVE   Result Value Ref Range    Glucose 91 65 - 99 mg/dL    BUN 15 8 - 27 mg/dL    Creatinine 1.06 0.76 - 1.27 mg/dL    GFR est non-AA 70 >59 mL/min/1.73    GFR est AA 81 >59 mL/min/1.73    BUN/Creatinine ratio 14 10 - 24    Sodium 140 134 - 144 mmol/L    Potassium 4.4 3.5 - 5.2 mmol/L    Chloride 103 96 - 106 mmol/L    CO2 21 18 - 29 mmol/L    Calcium 9.1 8.6 - 10.2 mg/dL    Protein, total 6.6 6.0 - 8.5 g/dL    Albumin 3.9 3.5 - 4.8 g/dL    GLOBULIN, TOTAL 2.7 1.5 - 4.5 g/dL    A-G Ratio 1.4 1.2 - 2.2    Bilirubin, total 0.6 0.0 - 1.2 mg/dL    Alk.  phosphatase 107 39 - 117 IU/L    AST (SGOT) 24 0 - 40 IU/L    ALT (SGPT) 16 0 - 44 IU/L   LIPID PANEL   Result Value Ref Range    Cholesterol, total 189 100 - 199 mg/dL    Triglyceride 73 0 - 149 mg/dL    HDL Cholesterol 47 >39 mg/dL    VLDL, calculated 15 5 - 40 mg/dL    LDL, calculated 127 (H) 0 - 99 mg/dL   CBC WITH AUTOMATED DIFF   Result Value Ref Range    WBC 6.2 3.4 - 10.8 x10E3/uL    RBC 4.66 4.14 - 5.80 x10E6/uL    HGB 14.0 12.6 - 17.7 g/dL    HCT 42.9 37.5 - 51.0 %    MCV 92 79 - 97 fL    MCH 30.0 26.6 - 33.0 pg    MCHC 32.6 31.5 - 35.7 g/dL    RDW 14.2 12.3 - 15.4 %    PLATELET 989 482 - 482 x10E3/uL    NEUTROPHILS 57 %    Lymphocytes 28 %    MONOCYTES 9 %    EOSINOPHILS 5 %    BASOPHILS 1 %    ABS. NEUTROPHILS 3.6 1.4 - 7.0 x10E3/uL    Abs Lymphocytes 1.7 0.7 - 3.1 x10E3/uL    ABS. MONOCYTES 0.5 0.1 - 0.9 x10E3/uL    ABS. EOSINOPHILS 0.3 0.0 - 0.4 x10E3/uL    ABS. BASOPHILS 0.1 0.0 - 0.2 x10E3/uL    IMMATURE GRANULOCYTES 0 %    ABS. IMM. GRANS. 0.0 0.0 - 0.1 x10E3/uL   CVD REPORT   Result Value Ref Range    INTERPRETATION Note          ASSESSMENT and PLAN    ICD-10-CM ICD-9-CM    1. COPD, moderate ([facility] J44.9 496    2. Hypercholesterolemia E78.00 272.0    3. Hypertension, essential I10 401.9    4. Primary osteoarthritis of both knees M17.0 715.16 REFERRAL TO PHYSICAL THERAPY   5. Weakness of left leg R29.898 729.89 REFERRAL TO PHYSICAL THERAPY   6. Medicare annual wellness visit, subsequent Z00.00 V70.0    7. Advance directive discussed with patient Z71.89 V65.49          COPD, moderate ([facility]  Stable    Hypercholesterolemia  Hyperlipidemia is borderline controlled.      Hypertension, essential  Hypertension is controlled    Primary osteoarthritis of both knees  -     REFERRAL TO PHYSICAL THERAPY    Weakness of left leg  -     REFERRAL TO PHYSICAL THERAPY    Medicare annual wellness visit, subsequent    Advance directive discussed with patient      Follow-up Disposition:  Return in about 6 months (around 10/18/2017) for HTN, chol, COPD  .  lab results and schedule of future lab studies reviewed with patient  reviewed diet, exercise and weight control  cardiovascular risk and specific lipid/LDL goals reviewed  I have discussed the diagnosis with the patient and the intended plan as seen in the above orders. Patient is in agreement. The patient has received an after-visit summary and questions were answered concerning future plans. I have discussed medication side effects and warnings with the patient as well.

## 2017-04-18 NOTE — PATIENT INSTRUCTIONS
See physical therapy about knees and leg weakness  [facility]   [address] [facility] [place], [address]   Tel: [phone]     [facility]   [address] [facility] [place] [address], [address] [place], [address]   Tel: [phone] Tel: [phone]     Office visit in 6 months     The best way to stay healthy is to live a healthy lifestyle. A healthy lifestyle includes regular exercise, eating a well-balanced diet, keeping a healthy weight and not smoking. Regular physical exams and screening tests are another important way to take care of yourself. Preventive exams provided by health care providers can find health problems early when treatment works best and can keep you from getting certain diseases or illnesses. Preventive services include exams, lab tests, screenings, shots, monitoring and information to help you take care of your own health. All people over 65 should have a pneumonia shot. Pneumonia shots are usually only needed once in a lifetime unless your doctor decides differently. In addition to your physical exam, some screening tests are recommended:    All people over 65 should have a yearly flu shot. People over 65 are at medium to high risk for Hepatitis B. Three shots are needed for complete protection. Bone mass measurement (dexa scan) is recommended every two years. Diabetes Mellitus screening is recommended every year. Glaucoma is an eye disease caused by high pressure in the eye. An eye exam is recommended every year. Cardiovascular screening tests that check your cholesterol and other blood fat (lipid) levels are recommended every five years. Colorectal Cancer screening tests help to find pre-cancerous polyps (growths in the colon) so they can be removed before they turn into cancer. Tests ordered for screening depend on your personal and family history risk factors.     Prostate Cancer Screening (annually up to age 76)    Screening for breast cancer is recommended yearly with a Mammogram.    Screening for cervical and vaginal cancer is recommended with a pelvic and Pap test every two years. However if you have had an abnormal pap in the past  three years or at high risk for cervical or vaginal cancer Medicare will cover a pap test and a pelvic exam every year. Here is a list of your current Health Maintenance items with a due date:  Health Maintenance Due   Topic Date Due    Annual Well Visit  10/13/2016            Well Visit, Over 72: Care Instructions  Your Care Instructions  Physical exams can help you stay healthy. Your doctor has checked your overall health and may have suggested ways to take good care of yourself. He or she also may have recommended tests. At home, you can help prevent illness with healthy eating, regular exercise, and other steps. Follow-up care is a key part of your treatment and safety. Be sure to make and go to all appointments, and call your doctor if you are having problems. It's also a good idea to know your test results and keep a list of the medicines you take. How can you care for yourself at home? · Reach and stay at a healthy weight. This will lower your risk for many problems, such as obesity, diabetes, heart disease, and high blood pressure. · Get at least 30 minutes of exercise on most days of the week. Walking is a good choice. You also may want to do other activities, such as running, swimming, cycling, or playing tennis or team sports. · Do not smoke. Smoking can make health problems worse. If you need help quitting, talk to your doctor about stop-smoking programs and medicines. These can increase your chances of quitting for good. · Protect your skin from too much sun. When you're outdoors from 10 a.m. to 4 p.m., stay in the shade or cover up with clothing and a hat with a wide brim. Wear sunglasses that block UV rays.  Even when it's cloudy, put broad-spectrum sunscreen (SPF 30 or higher) on any exposed skin. · See a dentist one or two times a year for checkups and to have your teeth cleaned. · Wear a seat belt in the car. · Limit alcohol to 2 drinks a day for men and 1 drink a day for women. Too much alcohol can cause health problems. Follow your doctor's advice about when to have certain tests. These tests can spot problems early. For men and women  · Cholesterol. Your doctor will tell you how often to have this done based on your overall health and other things that can increase your risk for heart attack and stroke. · Blood pressure. Have your blood pressure checked during a routine doctor visit. Your doctor will tell you how often to check your blood pressure based on your age, your blood pressure results, and other factors. · Diabetes. Ask your doctor whether you should have tests for diabetes. · Vision. Experts recommend that you have yearly exams for glaucoma and other age-related eye problems. · Hearing. Tell your doctor if you notice any change in your hearing. You can have tests to find out how well you hear. · Colon cancer tests. Keep having colon cancer tests as your doctor recommends. You can have one of several types of tests. · Heart attack and stroke risk. At least every 4 to 6 years, you should have your risk for heart attack and stroke assessed. Your doctor uses factors such as your age, blood pressure, cholesterol, and whether you smoke or have diabetes to show what your risk for a heart attack or stroke is over the next 10 years. · Osteoporosis. Talk to your doctor about whether you should have a bone density test to find out whether you have thinning bones. Also ask your doctor about whether you should take calcium and vitamin D supplements. For women  · Pap test and pelvic exam. You may no longer need a Pap test. Talk with your doctor about whether to stop or continue to have Pap tests.   · Breast exam and mammogram. Ask how often you should have a mammogram, which is an X-ray of your breasts. A mammogram can spot breast cancer before it can be felt and when it is easiest to treat. · Thyroid disease. Talk to your doctor about whether to have your thyroid checked as part of a regular physical exam. Women have an increased chance of a thyroid problem. For men  · Prostate exam. Talk to your doctor about whether you should have a blood test (called a PSA test) for prostate cancer. Experts disagree on whether men should have this test. Some experts recommend that you discuss the benefits and risks of the test with your doctor. · Abdominal aortic aneurysm. Ask your doctor whether you should have a test to check for an aneurysm. You may need a test if you ever smoked or if your parent, brother, sister, or child has had an aneurysm. When should you call for help? Watch closely for changes in your health, and be sure to contact your doctor if you have any problems or symptoms that concern you. Where can you learn more? Go to http://ronald-biju.info/. Enter J330 in the search box to learn more about \"Well Visit, Over 65: Care Instructions. \"  Current as of: July 19, 2016  Content Version: 11.2  © 1362-4887 [employer]. Care instructions adapted under license by [employer] (which disclaims liability or warranty for this information). If you have questions about a medical condition or this instruction, always ask your healthcare professional. [place] [address] any warranty or liability for your use of this information.

## 2017-04-18 NOTE — PROGRESS NOTES
This is a Subsequent Medicare Annual Wellness Visit providing Personalized Prevention Plan Services (PPPS) (Performed 12 months after initial AWV and PPPS )    I have reviewed the patient's medical history in detail and updated the computerized patient record. History     Past Medical History:   Diagnosis Date    Arthritis     knee    Cancer ([address]     skin    Chronic obstructive pulmonary disease (HCC)     Hypercholesterolemia     Hypertension     Ill-defined condition     lipids    Smoking 1/22/2010    Stopped 04/2000       Past Surgical History:   Procedure Laterality Date    COLORECTAL SCRN; HI RISK IND  1/12/2016         ENDOSCOPY, COLON, DIAGNOSTIC  07/16/2007    Dr Yannick Aponte polyp repeat 07/2010    HX ORTHOPAEDIC Right 3/2016    arthroscopic; torn medial meniscus     Current Outpatient Prescriptions   Medication Sig Dispense Refill    lisinopril (PRINIVIL, ZESTRIL) 20 mg tablet TAKE 1 TABLET DAILY 90 Tab 3    tadalafil (CIALIS) 5 mg tablet TAKE 1 TABLET AS NEEDED 30 Tab 5    pravastatin (PRAVACHOL) 40 mg tablet TAKE 1 TABLET DAILY 90 Tab 3    albuterol (PROVENTIL HFA, VENTOLIN HFA, PROAIR HFA) 90 mcg/actuation inhaler Take 2 Puffs by inhalation every four (4) hours as needed for Wheezing.  1 Inhaler 1     Allergies   Allergen Reactions    Macrobid [Nitrofurantoin Monohyd/M-Cryst] Unknown (comments)    Pravastatin Myalgia    Tetracycline Unknown (comments)     Family History   Problem Relation Age of Onset    Hypertension Mother     Thyroid Disease Mother     Kidney Disease Father     Cancer Father      skin    Diabetes Father     Cancer Paternal Aunt      lung    Cancer Paternal Uncle      skin     Social History   Substance Use Topics    Smoking status: Former Smoker     Packs/day: 2.00     Years: 40.00     Quit date: 4/1/2000    Smokeless tobacco: Never Used    Alcohol use 0.0 oz/week     2 - 3 Cans of beer per week     Patient Active Problem List   Diagnosis Code    Hypercholesterolemia E78.00    Hypertension, essential I10    S/P colonoscopy T94.817    ED (erectile dysfunction) N52.9    Actinic keratosis L57.0    COPD, moderate (HCC) J44.9       Depression Risk Factor Screening:     PHQ 2 / 9, over the last two weeks 4/18/2017   Little interest or pleasure in doing things Not at all   Feeling down, depressed or hopeless Not at all   Total Score PHQ 2 0     Alcohol Risk Factor Screening: On any occasion during the past 3 months, have you had more than 4 drinks containing alcohol? No    Do you average more than 14 drinks per week? No      Functional Ability and Level of Safety:     Hearing Loss   borderline normal-to-mild    Activities of Daily Living   Self-care. Requires assistance with: no ADLs    Fall Risk     Fall Risk Assessment, last 12 mths 4/18/2017   Able to walk? Yes   Fall in past 12 months?  No   Fall with injury? -   Number of falls in past 12 months -   Fall Risk Score -     Abuse Screen   Patient is not abused    Review of Systems   Not required    Physical Examination     Evaluation of Cognitive Function:  Mood/affect:  happy  Appearance: age appropriate, casually dressed and within normal Limits  Family member/caregiver input: none  Cognition and memory appear normal.    Patient Care Team:  Corbin Goldberg, MD as PCP - General (Internal Medicine)  Nelly Bridges (Inactive) (Dermatology)  Charles Benson MD (Ophthalmology)    Advice/Referrals/Counseling   Education and counseling provided:  End-of-Life planning (with patient's consent)      Assessment/Plan   See other note this date

## 2017-04-18 NOTE — MR AVS SNAPSHOT
Visit Information Date & Time Provider Department Dept. Phone Encounter #  
 4/18/2017  8:15 AM Giovani Tapia MD [facility] [phone] [id number] Follow-up Instructions Return in about 6 months (around 10/18/2017) for HTN, chol, COPD  . Upcoming Health Maintenance Date Due  
 MEDICARE YEARLY EXAM 10/13/2016 DTaP/Tdap/Td series (1 - Tdap) 9/7/2022* GLAUCOMA SCREENING Q2Y 8/15/2018 COLONOSCOPY 1/12/2021 *Topic was postponed. The date shown is not the original due date. Allergies as of 4/18/2017  Review Complete On: 4/18/2017 By: Giovani Tapia MD  
  
 Severity Noted Reaction Type Reactions Macrobid [Nitrofurantoin Monohyd/m-cryst]  01/22/2010    Unknown (comments) Pravastatin  04/18/2017    Myalgia Tetracycline  01/22/2010    Unknown (comments) Current Immunizations  Reviewed on 4/18/2017 Name Date Influenza High Dose Vaccine PF 10/11/2016, 10/13/2015, 9/17/2014 Influenza Vaccine 12/30/2013 Influenza Vaccine Split 10/8/2012 Influenza Vaccine Whole 10/24/2011 Pneumococcal Conjugate (PCV-13) 3/31/2015 Pneumococcal Vaccine (Unspecified Type) 10/24/2011 TD Vaccine 9/7/2012, 6/1/2007 Zoster 5/28/2009 Reviewed by Kristal Heredia LPN on 2/36/4242 at  8:16 AM  
You Were Diagnosed With   
  
 Codes Comments COPD, moderate ([facility]    -  Primary ICD-10-CM: J44.9 ICD-9-CM: 375 Hypercholesterolemia     ICD-10-CM: E78.00 ICD-9-CM: 272.0 Hypertension, essential     ICD-10-CM: I10 
ICD-9-CM: 401.9 Primary osteoarthritis of both knees     ICD-10-CM: M17.0 ICD-9-CM: 715.16 Weakness of left leg     ICD-10-CM: R29.898 ICD-9-CM: 729.89 Medicare annual wellness visit, subsequent     ICD-10-CM: Z00.00 ICD-9-CM: V70.0 Advance directive discussed with patient     ICD-10-CM: Z71.89 ICD-9-CM: V65.49 Vitals BP Pulse Temp Resp Height(growth percentile) Weight(growth percentile) 124/82 (BP 1 Location: Right arm, BP Patient Position: Sitting) 61 97.1 °F (36.2 °C) (Oral) 16 5' 10\" (1.778 m) 174 lb (78.9 kg) SpO2 BMI Smoking Status 97% 24.97 kg/m2 Former Smoker Vitals History BMI and BSA Data Body Mass Index Body Surface Area 24.97 kg/m 2 1.97 m 2 Preferred Pharmacy Pharmacy Name Phone [facility] Lee [facility] [phone] Your Updated Medication List  
  
   
This list is accurate as of: 4/18/17  8:48 AM.  Always use your most recent med list.  
  
  
  
  
 albuterol 90 mcg/actuation inhaler Commonly known as:  PROVENTIL HFA, VENTOLIN HFA, PROAIR HFA Take 2 Puffs by inhalation every four (4) hours as needed for Wheezing. lisinopril 20 mg tablet Commonly known as:  PRINIVIL, ZESTRIL  
TAKE 1 TABLET DAILY  
  
 tadalafil 5 mg tablet Commonly known as:  CIALIS  
TAKE 1 TABLET AS NEEDED We Performed the Following REFERRAL TO PHYSICAL THERAPY [VMF12 Custom] Follow-up Instructions Return in about 6 months (around 10/18/2017) for HTN, chol, COPD  . Referral Information Referral ID Referred By Referred To  
  
 [id number] Ever Friedman Not Available Visits Status Start Date End Date 1 New Request 4/18/17 4/18/18 If your referral has a status of pending review or denied, additional information will be sent to support the outcome of this decision. Patient Instructions See physical therapy about knees and leg weakness [facility] [address] [facility], [address] Tel: [phone] [facility] [address] [facility] [address], [address] [place], [address] [place] [address] Tel: [phone] Tel: [phone] Office visit in 6 months The best way to stay healthy is to live a healthy lifestyle. A healthy lifestyle includes regular exercise, eating a well-balanced diet, keeping a healthy weight and not smoking. Regular physical exams and screening tests are another important way to take care of yourself. Preventive exams provided by health care providers can find health problems early when treatment works best and can keep you from getting certain diseases or illnesses. Preventive services include exams, lab tests, screenings, shots, monitoring and information to help you take care of your own health. All people over 65 should have a pneumonia shot. Pneumonia shots are usually only needed once in a lifetime unless your doctor decides differently. In addition to your physical exam, some screening tests are recommended: 
 
All people over 65 should have a yearly flu shot. People over 65 are at medium to high risk for Hepatitis B. Three shots are needed for complete protection. Bone mass measurement (dexa scan) is recommended every two years. Diabetes Mellitus screening is recommended every year. Glaucoma is an eye disease caused by high pressure in the eye. An eye exam is recommended every year. Cardiovascular screening tests that check your cholesterol and other blood fat (lipid) levels are recommended every five years. Colorectal Cancer screening tests help to find pre-cancerous polyps (growths in the colon) so they can be removed before they turn into cancer. Tests ordered for screening depend on your personal and family history risk factors. Prostate Cancer Screening (annually up to age 76) Screening for breast cancer is recommended yearly with a Mammogram. 
 
Screening for cervical and vaginal cancer is recommended with a pelvic and Pap test every two years.  However if you have had an abnormal pap in the past  three years or at high risk for cervical or vaginal cancer Medicare will cover a pap test and a pelvic exam every year. Here is a list of your current Health Maintenance items with a due date: 
Health Maintenance Due Topic Date Due  
 Annual Well Visit  10/13/2016 Well Visit, Over 72: Care Instructions Your Care Instructions Physical exams can help you stay healthy. Your doctor has checked your overall health and may have suggested ways to take good care of yourself. He or she also may have recommended tests. At home, you can help prevent illness with healthy eating, regular exercise, and other steps. Follow-up care is a key part of your treatment and safety. Be sure to make and go to all appointments, and call your doctor if you are having problems. It's also a good idea to know your test results and keep a list of the medicines you take. How can you care for yourself at home? · Reach and stay at a healthy weight. This will lower your risk for many problems, such as obesity, diabetes, heart disease, and high blood pressure. · Get at least 30 minutes of exercise on most days of the week. Walking is a good choice. You also may want to do other activities, such as running, swimming, cycling, or playing tennis or team sports. · Do not smoke. Smoking can make health problems worse. If you need help quitting, talk to your doctor about stop-smoking programs and medicines. These can increase your chances of quitting for good. · Protect your skin from too much sun. When you're outdoors from 10 a.m. to 4 p.m., stay in the shade or cover up with clothing and a hat with a wide brim. Wear sunglasses that block UV rays. Even when it's cloudy, put broad-spectrum sunscreen (SPF 30 or higher) on any exposed skin. · See a dentist one or two times a year for checkups and to have your teeth cleaned. · Wear a seat belt in the car. · Limit alcohol to 2 drinks a day for men and 1 drink a day for women. Too much alcohol can cause health problems. Follow your doctor's advice about when to have certain tests. These tests can spot problems early. For men and women · Cholesterol. Your doctor will tell you how often to have this done based on your overall health and other things that can increase your risk for heart attack and stroke. · Blood pressure. Have your blood pressure checked during a routine doctor visit. Your doctor will tell you how often to check your blood pressure based on your age, your blood pressure results, and other factors. · Diabetes. Ask your doctor whether you should have tests for diabetes. · Vision. Experts recommend that you have yearly exams for glaucoma and other age-related eye problems. · Hearing. Tell your doctor if you notice any change in your hearing. You can have tests to find out how well you hear. · Colon cancer tests. Keep having colon cancer tests as your doctor recommends. You can have one of several types of tests. · Heart attack and stroke risk. At least every 4 to 6 years, you should have your risk for heart attack and stroke assessed. Your doctor uses factors such as your age, blood pressure, cholesterol, and whether you smoke or have diabetes to show what your risk for a heart attack or stroke is over the next 10 years. · Osteoporosis. Talk to your doctor about whether you should have a bone density test to find out whether you have thinning bones. Also ask your doctor about whether you should take calcium and vitamin D supplements. For women · Pap test and pelvic exam. You may no longer need a Pap test. Talk with your doctor about whether to stop or continue to have Pap tests. · Breast exam and mammogram. Ask how often you should have a mammogram, which is an X-ray of your breasts. A mammogram can spot breast cancer before it can be felt and when it is easiest to treat. · Thyroid disease.  Talk to your doctor about whether to have your thyroid checked as part of a regular physical exam. Women have an increased chance of a thyroid problem. For men · Prostate exam. Talk to your doctor about whether you should have a blood test (called a PSA test) for prostate cancer. Experts disagree on whether men should have this test. Some experts recommend that you discuss the benefits and risks of the test with your doctor. · Abdominal aortic aneurysm. Ask your doctor whether you should have a test to check for an aneurysm. You may need a test if you ever smoked or if your parent, brother, sister, or child has had an aneurysm. When should you call for help? Watch closely for changes in your health, and be sure to contact your doctor if you have any problems or symptoms that concern you. Where can you learn more? Go to http://ronald-biju.info/. Enter Z516 in the search box to learn more about \"Well Visit, Over 65: Care Instructions. \" Current as of: July 19, 2016 Content Version: 11.2 © 5156-2349 [employer]. Care instructions adapted under license by [employer] (which disclaims liability or warranty for this information). If you have questions about a medical condition or this instruction, always ask your healthcare professional. [place] [address] any warranty or liability for your use of this information. Introducing [facility]! Dear Ashia Whitehead: 
Thank you for requesting a [employer] account. Our records indicate that you already have an active [employer] account. You can access your account anytime at [email or web address] Did you know that you can access your hospital and ER discharge instructions at any time in [employer]? You can also review all of your test results from your hospital stay or ER visit. Additional Information If you have questions, please visit the Frequently Asked Questions section of the [employer] website at [email or web address] Remember, MyChart is NOT to be used for urgent needs. For medical emergencies, dial 911. Now available from your iPhone and Android! Please provide this summary of care documentation to your next provider. Your primary care clinician is listed as Etienne Cobb. If you have any questions after today's visit, please call [phone].

## 2017-05-11 ENCOUNTER — OFFICE VISIT (OUTPATIENT)
Dept: INTERNAL MEDICINE CLINIC | Age: 72
End: 2017-05-11

## 2017-05-11 VITALS
HEART RATE: 79 BPM | OXYGEN SATURATION: 95 % | RESPIRATION RATE: 14 BRPM | DIASTOLIC BLOOD PRESSURE: 73 MMHG | TEMPERATURE: 97.7 F | BODY MASS INDEX: 24.69 KG/M2 | WEIGHT: 172.5 LBS | SYSTOLIC BLOOD PRESSURE: 116 MMHG | HEIGHT: 70 IN

## 2017-05-11 DIAGNOSIS — R20.2 NUMBNESS AND TINGLING OF RIGHT ARM: Primary | ICD-10-CM

## 2017-05-11 DIAGNOSIS — N52.9 ERECTILE DYSFUNCTION, UNSPECIFIED ERECTILE DYSFUNCTION TYPE: ICD-10-CM

## 2017-05-11 DIAGNOSIS — R20.0 NUMBNESS AND TINGLING OF RIGHT ARM: Primary | ICD-10-CM

## 2017-05-11 RX ORDER — GUAIFENESIN 100 MG/5ML
81 LIQUID (ML) ORAL DAILY
Qty: 30 TAB
Start: 2017-05-11 | End: 2017-09-13

## 2017-05-11 RX ORDER — TADALAFIL 10 MG/1
TABLET ORAL
Qty: 30 TAB | Refills: 5
Start: 2017-05-11 | End: 2017-05-26 | Stop reason: SDUPTHER

## 2017-05-11 RX ORDER — PRAVASTATIN SODIUM 40 MG/1
TABLET ORAL
Qty: 90 TAB | Refills: 3
Start: 2017-05-11 | End: 2017-09-24 | Stop reason: SDUPTHER

## 2017-05-11 NOTE — PROGRESS NOTES
HISTORY OF PRESENT ILLNESS  Carlos Dong is a 70 y.o. male. HPI He has had 4 episodes in the past month, consisting of lack of sensation in right arm and forearm. He has pain in the right neck when this occurs. The first time it occurred, he was reaching overhead to put on his shirt. The second time he was carrying bharath material on the right shoulder. The third episode he was reaching over head at hardware store. Arm feels weak with numbness. the 4 th episode occurred at home; he dropped loaf of bread. Each time lasted only 2-3 minutes. No pain with movement of right shoulder or neck. No numbness or weakness in the legs. He does not experience facial weakness or numbness, visual change, dizziness or ataxia, difficulty speaking or understanding speech with these episodes. Nothing makes this better or worse. He has erectile dysfunction. We increased the dose of Cialis to 5 mg. He is finishing the 2.5 mg tablets he has by taking 2 tablets. He would like to get 5 mg tablets with the next refill. He will call us when that is needed.   Patient Active Problem List   Diagnosis Code    Hypercholesterolemia E78.00    Hypertension, essential I10    S/P colonoscopy J82.600    ED (erectile dysfunction) N52.9    Actinic keratosis L57.0    COPD, moderate ([address] J44.9     Past Medical History:   Diagnosis Date    Arthritis     knee    Cancer ([address]     skin    Chronic obstructive pulmonary disease ([address]     Hypercholesterolemia     Hypertension     Ill-defined condition     lipids    Smoking 1/22/2010    Stopped 04/2000      Past Surgical History:   Procedure Laterality Date    COLORECTAL SCRN; HI RISK IND  1/12/2016         ENDOSCOPY, COLON, DIAGNOSTIC  07/16/2007    Dr Lenard Doan polyp repeat 07/2010    HX ORTHOPAEDIC Right 3/2016    arthroscopic; torn medial meniscus     Social History     Social History    Marital status:      Spouse name: N/A    Number of children: 3    Years of education: N/A     Occupational History    construction      Social History Main Topics    Smoking status: Former Smoker     Packs/day: 2.00     Years: 40.00     Quit date: 4/1/2000    Smokeless tobacco: Never Used    Alcohol use 0.0 oz/week     2 - 3 Cans of beer per week    Drug use: No    Sexual activity: Not Asked     Other Topics Concern    None     Social History Narrative     Family History   Problem Relation Age of Onset    Hypertension Mother     Thyroid Disease Mother     Kidney Disease Father     Cancer Father      skin    Diabetes Father     Cancer Paternal Aunt      lung    Cancer Paternal Uncle      skin     Allergies   Allergen Reactions    Macrobid [Nitrofurantoin Monohyd/M-Cryst] Unknown (comments)    Pravastatin Myalgia    Tetracycline Unknown (comments)     Current Outpatient Prescriptions   Medication Sig Dispense Refill    lisinopril (PRINIVIL, ZESTRIL) 20 mg tablet TAKE 1 TABLET DAILY 90 Tab 3    tadalafil (CIALIS) 5 mg tablet TAKE 1 TABLET AS NEEDED 30 Tab 5    albuterol (PROVENTIL HFA, VENTOLIN HFA, PROAIR HFA) 90 mcg/actuation inhaler Take 2 Puffs by inhalation every four (4) hours as needed for Wheezing. 1 Inhaler 1       ROS     Visit Vitals    /73 (BP 1 Location: Right arm, BP Patient Position: Sitting)    Pulse 79    Temp 97.7 °F (36.5 °C) (Oral)    Resp 14    Ht 5' 10\" (1.778 m)    Wt 172 lb 8 oz (78.2 kg)    SpO2 95%    BMI 24.75 kg/m2     Physical Exam   Constitutional: He is oriented to person, place, and time. He appears well-developed and well-nourished. HENT:   Head: Normocephalic and atraumatic. Eyes: Conjunctivae are normal. Pupils are equal, round, and reactive to light. Neck: Neck supple. Carotid bruit is not present. Cardiovascular: Normal rate, regular rhythm, S1 normal, S2 normal and normal heart sounds. Exam reveals no gallop. No murmur heard. Pulmonary/Chest: Effort normal and breath sounds normal. He has no wheezes. He has no rhonchi. He has no rales. Musculoskeletal: He exhibits no edema. Right shoulder: He exhibits normal range of motion, no tenderness, no bony tenderness, normal pulse and normal strength. Cervical back: He exhibits normal range of motion, no tenderness, no bony tenderness and no spasm. Spurling's maneuver is negative   Neurological: He is alert and oriented to person, place, and time. He has normal strength. No cranial nerve deficit or sensory deficit. Reflex Scores:       Bicep reflexes are 2+ on the right side and 2+ on the left side. Patellar reflexes are 2+ on the right side and 2+ on the left side. Achilles reflexes are 1+ on the right side and 1+ on the left side. Motor strength right and left is 5/5 to finger flexion/extension, abduction and opposition, wrist flexion/extension, elbow flexion extension  Motor strength right and left is 5/5 to dorsiflexion/plantar flexion of ankle and great toe, flexion/extension of knee, flexion of hip     Skin: Skin is warm, dry and intact. Psychiatric: He has a normal mood and affect. His behavior is normal.   Nursing note and vitals reviewed. I have personally viewed the X-rays and given results to the patient. Results for orders placed or performed in visit on 05/11/17   XR SPINE CERV 4 OR 5 V    Narrative    INDICATION:  Right arm numbness and weakness. COMPARISON:  No old study. FINDINGS:    Five views, AP, lateral, bilateral oblique, and open-mouth odontoid were  obtained of the cervical spine. The vertebral bodies show satisfactory height. There is straightening of the cervical lordosis. Spurring at C4-C5 and a degenerative narrowed disc and spurring at C5-C6 are  seen. The prevertebral soft tissues are not widened. The odontoid appears intact. Carotid vascular calcification is noted. Impression    IMPRESSION:    Degenerative spondylosis C4-C6. ASSESSMENT and PLAN    ICD-10-CM ICD-9-CM    1.  Numbness and tingling of right arm R20.0 782.0 XR SPINE CERV 4 OR 5 V    R20.2  DUPLEX CAROTID BILATERAL      aspirin 81 mg chewable tablet   2. Erectile dysfunction, unspecified erectile dysfunction type N52.9 607.84        Numbness and tingling of right arm  Must consider possibility of TIA. -     XR SPINE CERV 4 OR 5 V; Future  -     DUPLEX CAROTID BILATERAL; Future  -     aspirin 81 mg chewable tablet; Take 1 Tab by mouth daily. Erectile dysfunction, unspecified erectile dysfunction type  -     tadalafil (CIALIS) 10 mg tablet; TAKE 1 TABLET AS NEEDED      -     pravastatin (PRAVACHOL) 40 mg tablet; TAKE 1 TABLET DAILY      Follow-up Disposition:  Return if symptoms worsen or fail to improve. radiology results and schedule of future radiology studies reviewed with patient  I have discussed the diagnosis with the patient and the intended plan as seen in the above orders. Patient is in agreement. The patient has received an after-visit summary and questions were answered concerning future plans. I have discussed medication side effects and warnings with the patient as well.

## 2017-05-11 NOTE — MR AVS SNAPSHOT
Visit Information Date & Time Provider Department Dept. Phone Encounter #  
 5/11/2017  2:30 PM MD Esther Trevino [phone] [id number] Follow-up Instructions Return if symptoms worsen or fail to improve. Your Appointments 10/24/2017  8:15 AM  
ROUTINE CARE with MD Esther Trevino [facility]) Appt Note: 6mth f/u;  HTN, chol, COPD  
 [address] [place] [address] [phone]  
  
   
 8 [facility] [address] Upcoming Health Maintenance Date Due DTaP/Tdap/Td series (1 - Tdap) 9/7/2022* INFLUENZA AGE 9 TO ADULT 8/1/2017 MEDICARE YEARLY EXAM 4/19/2018 GLAUCOMA SCREENING Q2Y 8/15/2018 COLONOSCOPY 1/12/2021 *Topic was postponed. The date shown is not the original due date. Allergies as of 5/11/2017  Review Complete On: 5/11/2017 By: Guerrero Patel MD  
  
 Severity Noted Reaction Type Reactions Macrobid [Nitrofurantoin Monohyd/m-cryst]  01/22/2010    Unknown (comments) Pravastatin  04/18/2017    Myalgia Tetracycline  01/22/2010    Unknown (comments) Current Immunizations  Reviewed on 5/11/2017 Name Date Influenza High Dose Vaccine PF 10/11/2016, 10/13/2015, 9/17/2014 Influenza Vaccine 12/30/2013 Influenza Vaccine Split 10/8/2012 Influenza Vaccine Whole 10/24/2011 Pneumococcal Conjugate (PCV-13) 3/31/2015 Pneumococcal Vaccine (Unspecified Type) 10/24/2011 TD Vaccine 9/7/2012, 6/1/2007 Zoster 5/28/2009 Reviewed by Jacqui Valenzuela LPN on 4/50/0685 at  2:33 PM  
You Were Diagnosed With   
  
 Codes Comments Erectile dysfunction, unspecified erectile dysfunction type    -  Primary ICD-10-CM: N52.9 ICD-9-CM: 607.84 Numbness and tingling of right arm     ICD-10-CM: R20.0, R20.2 ICD-9-CM: 600. 0 Vitals BP Pulse Temp Resp Height(growth percentile) Weight(growth percentile) 116/73 (BP 1 Location: Right arm, BP Patient Position: Sitting) 79 97.7 °F (36.5 °C) (Oral) 14 5' 10\" (1.778 m) 172 lb 8 oz (78.2 kg) SpO2 BMI Smoking Status 95% 24.75 kg/m2 Former Smoker BMI and BSA Data Body Mass Index Body Surface Area 24.75 kg/m 2 1.97 m 2 Preferred Pharmacy Pharmacy Name Phone [id number] [phone] Your Updated Medication List  
  
   
This list is accurate as of: 5/11/17  4:20 PM.  Always use your most recent med list.  
  
  
  
  
 albuterol 90 mcg/actuation inhaler Commonly known as:  PROVENTIL HFA, VENTOLIN HFA, PROAIR HFA Take 2 Puffs by inhalation every four (4) hours as needed for Wheezing. aspirin 81 mg chewable tablet Take 1 Tab by mouth daily. lisinopril 20 mg tablet Commonly known as:  PRINIVIL, ZESTRIL  
TAKE 1 TABLET DAILY pravastatin 40 mg tablet Commonly known as:  PRAVACHOL  
TAKE 1 TABLET DAILY  
  
 tadalafil 10 mg tablet Commonly known as:  CIALIS  
TAKE 1 TABLET AS NEEDED Follow-up Instructions Return if symptoms worsen or fail to improve. To-Do List   
 05/11/2017 Imaging:  XR SPINE CERV 4 OR 5 V   
  
 05/12/2017 Imaging:  DUPLEX CAROTID BILATERAL Patient Instructions Start Aspirin 81 mg daily (preferable chewable). Restart pravastatin Get carotid doppler done to be sure there is no circulation problem to the brain. Numbness and Tingling: Care Instructions Your Care Instructions Many things can cause numbness or tingling. Swelling may put pressure on a nerve. This could cause you to lose feeling or have a pins-and-needles sensation on part of your body. Nerves may be damaged from trauma, toxins, or diseases, such as diabetes or multiple sclerosis (MS).  Sometimes, though, the cause is not clear. If there is no clear reason for your symptoms, and you are not having any other symptoms, your doctor may suggest watching and waiting for a while to see if the numbness or tingling goes away on its own. Your doctor may want you to have blood or nerve tests to find the cause of your symptoms. Follow-up care is a key part of your treatment and safety. Be sure to make and go to all appointments, and call your doctor if you are having problems. It's also a good idea to know your test results and keep a list of the medicines you take. How can you care for yourself at home? · If your doctor prescribes medicine, take it exactly as directed. Call your doctor if you think you are having a problem with your medicine. · If you have any swelling, put ice or a cold pack on the area for 10 to 20 minutes at a time. Put a thin cloth between the ice and your skin. When should you call for help? Call 911 anytime you think you may need emergency care. For example, call if: 
· You have weakness, numbness, or tingling in both legs. · You lose bowel or bladder control. · You have symptoms of a stroke. These may include: 
¨ Sudden numbness, tingling, weakness, or loss of movement in your face, arm, or leg, especially on only one side of your body. ¨ Sudden vision changes. ¨ Sudden trouble speaking. ¨ Sudden confusion or trouble understanding simple statements. ¨ Sudden problems with walking or balance. ¨ A sudden, severe headache that is different from past headaches. Watch closely for changes in your health, and be sure to contact your doctor if you have any problems, or if: 
· You do not get better as expected. Where can you learn more? Go to http://ronald-biju.info/. Enter Z198 in the search box to learn more about \"Numbness and Tingling: Care Instructions. \" Current as of: October 14, 2016 Content Version: 11.2 © 4926-2742 Healthwise, Incorporated. Care instructions adapted under license by [employer] (which disclaims liability or warranty for this information). If you have questions about a medical condition or this instruction, always ask your healthcare professional. [address] any warranty or liability for your use of this information. Introducing [facility]! Dear Conor Quarles: 
Thank you for requesting a [employer] account. Our records indicate that you already have an active [employer] account. You can access your account anytime at [email or web address] Did you know that you can access your hospital and ER discharge instructions at any time in [employer]? You can also review all of your test results from your hospital stay or ER visit. Additional Information If you have questions, please visit the Frequently Asked Questions section of the [employer] website at [email or web address] Remember, [employer] is NOT to be used for urgent needs. For medical emergencies, dial 911. Now available from your iPhone and Android! Please provide this summary of care documentation to your next provider. Your primary care clinician is listed as Micky Watters. If you have any questions after today's visit, please call [phone].

## 2017-05-11 NOTE — PROGRESS NOTES
Reviewed record In preparation for visit and have obtained necessary documentation. Advanced directive / living will on file. 1. Have you been to the ER, urgent care clinic or hospitalized since your last visit? No     2. Have you seen or consulted any other health care providers outside of the [address] [place] [address] since your last visit? Include any pap smears or colon screening.  No    Health Maintenance reviewed:

## 2017-05-11 NOTE — PATIENT INSTRUCTIONS
Start Aspirin 81 mg daily (preferable chewable). Restart pravastatin  Get carotid doppler done to be sure there is no circulation problem to the brain. Numbness and Tingling: Care Instructions  Your Care Instructions  Many things can cause numbness or tingling. Swelling may put pressure on a nerve. This could cause you to lose feeling or have a pins-and-needles sensation on part of your body. Nerves may be damaged from trauma, toxins, or diseases, such as diabetes or multiple sclerosis (MS). Sometimes, though, the cause is not clear. If there is no clear reason for your symptoms, and you are not having any other symptoms, your doctor may suggest watching and waiting for a while to see if the numbness or tingling goes away on its own. Your doctor may want you to have blood or nerve tests to find the cause of your symptoms. Follow-up care is a key part of your treatment and safety. Be sure to make and go to all appointments, and call your doctor if you are having problems. It's also a good idea to know your test results and keep a list of the medicines you take. How can you care for yourself at home? · If your doctor prescribes medicine, take it exactly as directed. Call your doctor if you think you are having a problem with your medicine. · If you have any swelling, put ice or a cold pack on the area for 10 to 20 minutes at a time. Put a thin cloth between the ice and your skin. When should you call for help? Call 911 anytime you think you may need emergency care. For example, call if:  · You have weakness, numbness, or tingling in both legs. · You lose bowel or bladder control. · You have symptoms of a stroke. These may include:  ¨ Sudden numbness, tingling, weakness, or loss of movement in your face, arm, or leg, especially on only one side of your body. ¨ Sudden vision changes. ¨ Sudden trouble speaking. ¨ Sudden confusion or trouble understanding simple statements.   ¨ Sudden problems with walking or balance. ¨ A sudden, severe headache that is different from past headaches. Watch closely for changes in your health, and be sure to contact your doctor if you have any problems, or if:  · You do not get better as expected. Where can you learn more? Go to http://ronald-biju.info/. Enter I256 in the search box to learn more about \"Numbness and Tingling: Care Instructions. \"  Current as of: October 14, 2016  Content Version: 11.2  © 1237-6418 [employer]. Care instructions adapted under license by [employer] (which disclaims liability or warranty for this information). If you have questions about a medical condition or this instruction, always ask your healthcare professional. [address] any warranty or liability for your use of this information.

## 2017-05-19 ENCOUNTER — HOSPITAL ENCOUNTER (OUTPATIENT)
Dept: VASCULAR SURGERY | Age: 72
Discharge: HOME | End: 2017-05-19
Attending: INTERNAL MEDICINE
Payer: MEDICARE

## 2017-05-19 DIAGNOSIS — R20.0 NUMBNESS AND TINGLING OF RIGHT ARM: ICD-10-CM

## 2017-05-19 DIAGNOSIS — R20.2 NUMBNESS AND TINGLING OF RIGHT ARM: ICD-10-CM

## 2017-05-19 PROCEDURE — 93880 EXTRACRANIAL BILAT STUDY: CPT

## 2017-05-19 NOTE — PROCEDURES
[facility]  *** FINAL REPORT ***    Name: Saloni Colby  MRN: [id number]    Outpatient  : 20 May 1945  HIS Order #: [id number]  [address] [facility] Visit #: [id number]  Date: 19 May 2017    TYPE OF TEST: Cerebrovascular Duplex    REASON FOR TEST  Right arm numbness and tingling    Right Carotid:-             Proximal               Mid                 Distal  cm/s  Systolic  Diastolic  Systolic  Diastolic  Systolic  Diastolic  CCA:     75.8                                      61.0  Bulb:  ECA:     68.0  ICA:     91.0                102.0      51.0       83.0  ICA/CCA:  1.5    ICA Stenosis: <50%    Right Vertebral:-  Finding: Antegrade  Sys:       42.0  Linda:    Right Subclavian: Normal    Left Carotid:-            Proximal                Mid                 Distal  cm/s  Systolic  Diastolic  Systolic  Diastolic  Systolic  Diastolic  CCA:     88.9                                      64.0  Bulb:  ECA:     59.0  ICA:    115.0      44.0      108.0      43.0       88.0  ICA/CCA:  1.8    ICA Stenosis: <50%    Left Vertebral:-  Finding: Antegrade  Sys:       32.0  Linda:    Left Subclavian: Normal    INTERPRETATION/FINDINGS  PROCEDURE:  Carotid Duplex Examination using B-mode, color and  spectral Doppler of the extracranial cerebrovascular arteries. 1. Bilateral <50% stenosis of the internal carotid arteries (Left ICA  high end of range). 2. No significant stenosis in the external carotid arteries  bilaterally. 3. Antegrade flow in both vertebral arteries. 4. Normal flow in both subclavian arteries. Plaque Morphology:  1. Calcific plaque in the bulb and right ICA. 2. Calcific plaque in the bulb and left ICA. ADDITIONAL COMMENTS    I have personally reviewed the data relevant to the interpretation of  this  study. TECHNOLOGIST: Charity Ford RVT  Signed: 2017 11:25 AM    PHYSICIAN: Veronica Mckeon MD  Signed: 2017 06:01 PM

## 2017-05-19 NOTE — PROGRESS NOTES
[facility]  Preliminary Report:  Carotid Duplex Scan    Right:  Mild plaque noted in the right carotid system. Right ICA velocities suggest less than 50% diameter reduction. Right vertebral artery flow is antegrade. Left:  Mild plaque noted in the left carotid system. Left ICA velocities suggest less than 50% diameter reduction. Left vertebral artery flow is antegrade. Final report to follow.

## 2017-05-21 NOTE — PROGRESS NOTES
Inform patient that carotid dopplers show plaque in the carotids, <50%. The concern is that in the face of arm weakness, this might be significant. Recommend increase aspirin to 325 mg daily. I suggest seeing neurologist as he might need further evaluation. (Dr Naveen Coughlin or Dr Ana Gutierrez at [facility] neurology clinic). Message also sent in My Chart.

## 2017-05-22 ENCOUNTER — APPOINTMENT (OUTPATIENT)
Dept: PHYSICAL THERAPY | Age: 72
End: 2017-05-22

## 2017-05-26 ENCOUNTER — HOSPITAL ENCOUNTER (OUTPATIENT)
Dept: PHYSICAL THERAPY | Age: 72
Discharge: HOME | End: 2017-05-26
Payer: MEDICARE

## 2017-05-26 PROCEDURE — 97110 THERAPEUTIC EXERCISES: CPT

## 2017-05-26 PROCEDURE — G8978 MOBILITY CURRENT STATUS: HCPCS

## 2017-05-26 PROCEDURE — 97161 PT EVAL LOW COMPLEX 20 MIN: CPT

## 2017-05-26 PROCEDURE — G8979 MOBILITY GOAL STATUS: HCPCS

## 2017-05-26 RX ORDER — TADALAFIL 10 MG/1
TABLET ORAL
Qty: 30 TAB | Refills: 5 | Status: SHIPPED | OUTPATIENT
Start: 2017-05-26 | End: 2017-05-30 | Stop reason: SDUPTHER

## 2017-05-26 NOTE — PROGRESS NOTES
PT INITIAL EVALUATION NOTE - [facility] 2-15    Patient Name: Mindy Morse  Date:2017  : 1945  [x]  Patient  Verified  [place]or: [place] MEDICARE / Plan: [place] MEDICARE PART A & B / Product Type: Medicare /    In time: 8:35 AM  Out time: 9:50 AM  Total Treatment Time (min): 75 minutes  Total Timed Codes (min): 20  1:1 Treatment Time (MC only): 65 minutes   Visit #: 1     Treatment Area: Bilateral primary osteoarthritis of knee [M17.0]  Other symptoms and signs involving the musculoskeletal system [R29.898]    SUBJECTIVE  Pain Level (0-10 scale): 4/10  Any medication changes, allergies to medications, adverse drug reactions, diagnosis change, or new procedure performed?: [] No    [x] Yes (see summary sheet for update)  Subjective: The Pt reports that he fell 8 ft. In 2017 and since then he has had bilateral knee pain (left > right). He reports that going down stairs, kneeling on the knee, squatting, standing from low chairs and in and out of the car cause him to have increased pain. He denies pain, but reports fatigue after standing > 2 hrs and walking > 1mi. He is independent with his ADLs, but has more pain and discomfort. He works part time as a  for a [employer]- he works ~20 hrs a week. He lives in a single story home with 3 stairs to enter with his wife. He is sleeping well at night, but reports stiffness in his knees when he first wakes up. X-rays show minimal degenerative changes along the medial knee joint and the patellofemoral joint. He reports some occasional numbness along the bottoms of his feet for the past year. He denies any radicular pains in his legs or instability in his LEs. PMH: Right menisectomy in 2016, HTN, and basal cell carcinoma removal . He is taking aspirin to try to improve blood flow and advil PRN for pain.     PLOF: The patient completed 20 minutes of exercise seldom or never  Mechanism of Injury: Fall in 2017  Previous Treatment/Compliance: Rest and medication  PMHx/Surgical Hx: Arthritis, Cancer, Chronic obstructive pulmonary disease or emphysema, HTN  Work Hx: Retired, but works part time as a   Living Situation: Lives in single story home with 3 stairs to enter with wife  Pt Goals: \"boating, remaining active\"  Barriers: None  Motivation: WFL  Substance use: N/A  FABQ Score: 26/100  Cognition: A & O x 4        OBJECTIVE/EXAMINATION  Posture:  Mild genu varus in standing  Other Observations:  N/A  Gait and Functional Mobility:  Unremarkable    Lumbar ROM:   Flexion: WFL   Extension: Mild   Side Bending: Right: Mild   Left: Mild   Rotation: Right: Mild    Left: Mild    Balance Assessment: Mild deficits in balance and neuromuscular control in single limb stance bilaterally    Squat Assessment:   Double Leg Deviates to the right, quadriceps dominant, forward trunk lean   Single Leg NT    Neurological: Sensations: Intact to light touch sensation L1-S1 bilaterally    Flexibility: Mild restrictions in hamstrings, hip internal and external rotators, quadriceps, iliopsoas, and gastrocnemius/soleus complex bilaterally     Right Knee:  AROM 0-145   Left  Knee:  AROM 0-141    LOWER QUARTER   MUSCLE STRENGTH  KEY       R  L  0 - No Contraction  Hip flex  4+  4+  1 - Trace          er    4  4-  2 - Poor          ir   4  4  3 - Fair           abd  4  4-  4 - Good          ext  4-  4-  5 - Normal   Knee flex  4+  4+               Ext  4+  4+      Ankle DF  4+  4+                PF  4+  4+              Gluteal activation: The Pt has improper gluteal activation with hip extension bilaterally     Joint Mobility Assessment: Mild hypomobility of patella (superior, inferior, medial, and lateral) bilaterally  Palpation: TTP along left medial and lateral knee joint lines and along inferior lateral patellar border    Special Tests:Varus: Neg     SLR: Neg    Valgus: Neg     Slump: Neg    Prince's: Neg    Sergio: Neg    Anterior Drawer: Neg    Obers: Neg    Posterior Drawer: Neg   Clonus: Neg    Lachman's: Neg      Modality rationale: decrease edema, decrease inflammation and decrease pain to improve the patients ability to perform ADLs with less pain or discomfort. Min Type Additional Details    [] Estim: []Att   []Unatt        []TENS instruct                  []IFC  []Premod   []NMES                     []Other:  []w/US   []w/ice   []w/heat  Position:  Location:    []  Traction: [] Cervical       []Lumbar                       [] Prone          []Supine                       []Intermittent   []Continuous Lbs:  [] before manual  [] after manual  []w/heat    []  Ultrasound: []Continuous   [] Pulsed at:                           []1MHz   []3MHz Location:  W/cm2:    [] Paraffin         Location:   []w/heat   10 [x]  Ice     []  Heat  []  Ice massage Position: Supine  Location: Left knee    []  Laser  []  Other: Position:  Location:      []  Vasopneumatic Device Pressure:       [] lo [] med [] hi   Temperature:      [x] Skin assessment post-treatment:  [x]intact []redness- no adverse reaction    []redness  adverse reaction:     20 min Therapeutic Exercise:  [x] See flow sheet :   Rationale: increase ROM, increase strength, improve coordination, improve balance and increase proprioception to improve the patients ability to perform ADLs with less pain or discomfort.           With   [x] TE   [] TA   [] neuro   [x] other: Patient Education: [x] Review HEP    [] Progressed/Changed HEP based on:   [] positioning   [] body mechanics   [] transfers   [] heat/ice application    [x] other: Pt educated on prognosis and diagnosis with therapy     Other Objective/Functional Measures: FOTO 45/100    Pain Level (0-10 scale) post treatment: 3/10    ASSESSMENT/Changes in Function:     [x]  See Plan of Donya Maldonado, PT 5/26/2017  8:35 AM

## 2017-05-26 NOTE — PROGRESS NOTES
[address] [place] [address] (MOB IV), [address] [facility] [place] Montaño  Phone: [phone] Fax: [phone]     Plan of Care/Statement of Necessity for Physical Therapy Services  2-15    Patient name: Shira Hidalgo  : 1945  Provider#: [id number]  Referral source: Seven Hernandez MD      Medical/Treatment Diagnosis: Bilateral primary osteoarthritis of knee [M17.0]  Other symptoms and signs involving the musculoskeletal system [R29.898]     Prior Hospitalization: see medical history     Comorbidities: Arthritis, Cancer, Chronic obstructive pulmonary disease or emphysema, HTN  Prior Level of Function: The patient completed 20 minutes of exercise seldom or never  Medications: Verified on Patient Summary List  Start of Care: 17      Onset Date: 2017   The Plan of Care and following information is based on the information from the initial evaluation. Assessment/ key information:  The Pt is a pleasant 67year old male who presents to therapy with left sided knee pain following a fall from an 8 ft elevated surface in 2017. The Pt currently displays decreased hip abduction and extension strength and stability, decreased core strength and stability, decreased thoracic spine mobility, decreased hip musculature flexibility and decreased activity tolerance and endurance. The patient would benefit from skilled physical therapy to help improve the above listed impairments to allow the patient to safely return to their prior level of function with less overall pain or risk of further injury. The patient has a fair prognosis with skilled physical therapy.     Evaluation Complexity History MEDIUM  Complexity : 1-2 comorbidities / personal factors will impact the outcome/ POC ; Examination HIGH Complexity : 4+ Standardized tests and measures addressing body structure, function, activity limitation and / or participation in recreation  ;Presentation LOW Complexity : Stable, uncomplicated  ;Clinical Decision Making MEDIUM Complexity : FOTO score of 26-74  Overall Complexity Rating: LOW     Problem List: pain affecting function, decrease ROM, decrease strength, impaired gait/ balance, decrease ADL/ functional abilitiies, decrease activity tolerance, decrease flexibility/ joint mobility and decrease transfer abilities   Treatment Plan may include any combination of the following: Therapeutic exercise, Therapeutic activities, Neuromuscular re-education, Physical agent/modality, Gait/balance training, Manual therapy, Patient education, Self Care training, Functional mobility training and Home safety training  Patient / Family readiness to learn indicated by: asking questions and interest  Persons(s) to be included in education: patient (P)  Barriers to Learning/Limitations: None  Patient Goal (s): boating, remaining active  Patient Self Reported Health Status: good  Rehabilitation Potential: fair    Short Term Goals: To be accomplished in 2 treatments:  1. The Pt will be independent and compliant with her HEP. Long Term Goals: To be accomplished in 5 treatments:  1. The Pt will score the MCII on his FOTO survey demonstrating improved overall function (45 to 51 points). 2. The Pt will be able to ascend and descend 1 standard flight of stairs with 0-2/10 pain to allow the Pt to be able to perform work duties with less pain. 3. The Pt will be able to perform 5 consecutive sit to stands with 0-2/10 pain to allow the Pt to transfer with less discomfort. Frequency / Duration: Patient to be seen 1 times per week for 5 treatments. Patient/ Caregiver education and instruction: self care, activity modification and exercises    []  Plan of care has been reviewed with PTA    G-Codes (GP)  Mobility   Current  CK= 40-59%   Goal  CJ= 20-39%    The severity rating is based on clinical judgment and the FOTO Score score.     Certification Period: 5/26/17-8/26/17    Alondra Joseph, PT 5/26/2017 11:21 AM    ________________________________________________________________________    I certify that the above Therapy Services are being furnished while the patient is under my care. I agree with the treatment plan and certify that this therapy is necessary.     [de-identified] Signature:____________________  Date:____________Time: _________

## 2017-05-30 RX ORDER — TADALAFIL 10 MG/1
TABLET ORAL
Qty: 30 TAB | Refills: 5 | Status: SHIPPED | OUTPATIENT
Start: 2017-05-30 | End: 2017-05-31 | Stop reason: SDUPTHER

## 2017-05-31 RX ORDER — TADALAFIL 5 MG/1
TABLET ORAL
Qty: 30 TAB | Refills: 5 | Status: SHIPPED | OUTPATIENT
Start: 2017-05-31 | End: 2018-06-15 | Stop reason: SDUPTHER

## 2017-06-06 ENCOUNTER — HOSPITAL ENCOUNTER (OUTPATIENT)
Dept: PHYSICAL THERAPY | Age: 72
Discharge: HOME | End: 2017-06-06
Payer: MEDICARE

## 2017-06-06 PROCEDURE — 97110 THERAPEUTIC EXERCISES: CPT

## 2017-06-12 ENCOUNTER — OFFICE VISIT (OUTPATIENT)
Dept: NEUROLOGY | Age: 72
End: 2017-06-12

## 2017-06-12 VITALS
SYSTOLIC BLOOD PRESSURE: 120 MMHG | WEIGHT: 173.6 LBS | HEART RATE: 68 BPM | HEIGHT: 70 IN | OXYGEN SATURATION: 99 % | BODY MASS INDEX: 24.85 KG/M2 | DIASTOLIC BLOOD PRESSURE: 80 MMHG

## 2017-06-12 DIAGNOSIS — R20.0 RIGHT ARM NUMBNESS: Primary | ICD-10-CM

## 2017-06-12 DIAGNOSIS — E78.2 MIXED HYPERLIPIDEMIA: ICD-10-CM

## 2017-06-12 DIAGNOSIS — I67.82 CEREBRAL ISCHEMIA: ICD-10-CM

## 2017-06-12 DIAGNOSIS — I63.89 OTHER CEREBRAL INFARCTION (HCC): ICD-10-CM

## 2017-06-12 NOTE — MR AVS SNAPSHOT
Visit Information Date & Time Provider Department Dept. Phone Encounter #  
 6/12/2017 10:00 AM Jourdan Yu MD Neurology [facility] at [facility] [phone] [id number] Your Appointments 10/24/2017  8:15 AM  
ROUTINE CARE with MD Marty Norwood [facility]) Appt Note: 6mth f/u;  HTN, chol, COPD  
 [address] [address] [phone]  
  
   
 [address] [facility] [address] Upcoming Health Maintenance Date Due DTaP/Tdap/Td series (1 - Tdap) 9/7/2022* INFLUENZA AGE 9 TO ADULT 8/1/2017 MEDICARE YEARLY EXAM 4/19/2018 GLAUCOMA SCREENING Q2Y 8/15/2018 COLONOSCOPY 1/12/2021 *Topic was postponed. The date shown is not the original due date. Allergies as of 6/12/2017  Review Complete On: 6/12/2017 By: Jourdan Yu MD  
  
 Severity Noted Reaction Type Reactions Macrobid [Nitrofurantoin Monohyd/m-cryst]  01/22/2010    Unknown (comments) Pravastatin  04/18/2017    Myalgia Tetracycline  01/22/2010    Unknown (comments) Current Immunizations  Reviewed on 5/11/2017 Name Date Influenza High Dose Vaccine PF 10/11/2016, 10/13/2015, 9/17/2014 Influenza Vaccine 12/30/2013 Influenza Vaccine Split 10/8/2012 Influenza Vaccine Whole 10/24/2011 Pneumococcal Conjugate (PCV-13) 3/31/2015 Pneumococcal Vaccine (Unspecified Type) 10/24/2011 TD Vaccine 9/7/2012, 6/1/2007 Zoster 5/28/2009 Not reviewed this visit You Were Diagnosed With   
  
 Codes Comments Right arm numbness    -  Primary ICD-10-CM: R20.2 ICD-9-CM: 782.0 Mixed hyperlipidemia     ICD-10-CM: E78.2 ICD-9-CM: 272.2 Cerebral ischemia     ICD-10-CM: I67.82 
ICD-9-CM: 704.2 Vitals BP Pulse Height(growth percentile) Weight(growth percentile) SpO2 BMI  
 120/80 68 5' 10\" (1.778 m) 173 lb 9.6 oz (78.7 kg) 99% 24.91 kg/m2 Smoking Status Former Smoker Vitals History BMI and BSA Data Body Mass Index Body Surface Area 24.91 kg/m 2 1.97 m 2 Preferred Pharmacy Pharmacy Name Phone [id number] [place] [phone] Your Updated Medication List  
  
   
This list is accurate as of: 6/12/17 10:46 AM.  Always use your most recent med list.  
  
  
  
  
 AMOXICILLIN PO Take  by mouth. aspirin 81 mg chewable tablet Take 1 Tab by mouth daily. lisinopril 20 mg tablet Commonly known as:  PRINIVIL, ZESTRIL  
TAKE 1 TABLET DAILY pravastatin 40 mg tablet Commonly known as:  PRAVACHOL  
TAKE 1 TABLET DAILY  
  
 tadalafil 5 mg tablet Commonly known as:  CIALIS  
TAKE 1 TABLET daily To-Do List   
 06/12/2017 Neurology:  EMG LIMITED   
  
 06/12/2017 Imaging:  MRA BRAIN WO CONT   
  
 06/12/2017 Imaging:  MRI BRAIN WO CONT   
  
 06/12/2017 Imaging:  MRI CERV SPINE WO CONT   
  
 06/14/2017 2:00 PM  
  Appointment with Piper Adam PT at [facility] ([phone]) Please remember to arrive at the hospital at least 30 minutes prior to your scheduled appointment time. When you come in for your appointment, please be sure to bring the therapy prescription the doctor gave you, your insurance card, and a list of the medicines you are taking. Also, please remember to wear comfortable, loose- fitting clothes. We look forward to seeing you.  
  
 06/22/2017 11:00 AM  
  Appointment with Piper Adam PT at [facility] ([phone]) Please remember to arrive at the hospital at least 30 minutes prior to your scheduled appointment time. When you come in for your appointment, please be sure to bring the therapy prescription the doctor gave you, your insurance card, and a list of the medicines you are taking.   Also, please remember to wear comfortable, loose- fitting clothes. We look forward to seeing you. 06/26/2017 8:00 AM  
  Appointment with Melissa Monroe PT at [facility] ([phone]) Please remember to arrive at the hospital at least 30 minutes prior to your scheduled appointment time. When you come in for your appointment, please be sure to bring the therapy prescription the doctor gave you, your insurance card, and a list of the medicines you are taking. Also, please remember to wear comfortable, loose- fitting clothes. We look forward to seeing you. Patient Instructions 1. MRI brain, MRA head, MRI cervical spine 2. EMG/nerve conduction study of the right upper extremity 3. Continue aspirin 4. Diet and exercise. Will recheck lipid panel in 3 months and if LDL is still elevated, will increase pravastatin 5. Follow-up in 3 months A Healthy Lifestyle: Care Instructions Your Care Instructions A healthy lifestyle can help you feel good, stay at a healthy weight, and have plenty of energy for both work and play. A healthy lifestyle is something you can share with your whole family. A healthy lifestyle also can lower your risk for serious health problems, such as high blood pressure, heart disease, and diabetes. You can follow a few steps listed below to improve your health and the health of your family. Follow-up care is a key part of your treatment and safety. Be sure to make and go to all appointments, and call your doctor if you are having problems. Its also a good idea to know your test results and keep a list of the medicines you take. How can you care for yourself at home? · Do not eat too much sugar, fat, or fast foods. You can still have dessert and treats now and then. The goal is moderation. · Start small to improve your eating habits. Pay attention to portion sizes, drink less juice and soda pop, and eat more fruits and vegetables. ¨ Eat a healthy amount of food. A 3-ounce serving of meat, for example, is about the size of a deck of cards. Fill the rest of your plate with vegetables and whole grains. ¨ Limit the amount of soda and sports drinks you have every day. Drink more water when you are thirsty. ¨ Eat at least 5 servings of fruits and vegetables every day. It may seem like a lot, but it is not hard to reach this goal. A serving or helping is 1 piece of fruit, 1 cup of vegetables, or 2 cups of leafy, raw vegetables. Have an apple or some carrot sticks as an afternoon snack instead of a candy bar. Try to have fruits and/or vegetables at every meal. 
· Make exercise part of your daily routine. You may want to start with simple activities, such as walking, bicycling, or slow swimming. Try to be active 30 to 60 minutes every day. You do not need to do all 30 to 60 minutes all at once. For example, you can exercise 3 times a day for 10 or 20 minutes. Moderate exercise is safe for most people, but it is always a good idea to talk to your doctor before starting an exercise program. 
· Keep moving. Anita Shorten the lawn, work in the garden, or [employer]. Take the stairs instead of the elevator at work. · If you smoke, quit. People who smoke have an increased risk for heart attack, stroke, cancer, and other lung illnesses. Quitting is hard, but there are ways to boost your chance of quitting tobacco for good. ¨ Use nicotine gum, patches, or lozenges. ¨ Ask your doctor about stop-smoking programs and medicines. ¨ Keep trying. In addition to reducing your risk of diseases in the future, you will notice some benefits soon after you stop using tobacco. If you have shortness of breath or asthma symptoms, they will likely get better within a few weeks after you quit. · Limit how much alcohol you drink. Moderate amounts of alcohol (up to 2 drinks a day for men, 1 drink a day for women) are okay.  But drinking too much can lead to liver problems, high blood pressure, and other health problems. Family health If you have a family, there are many things you can do together to improve your health. · Eat meals together as a family as often as possible. · Eat healthy foods. This includes fruits, vegetables, lean meats and dairy, and whole grains. · Include your family in your fitness plan. Most people think of activities such as jogging or tennis as the way to fitness, but there are many ways you and your family can be more active. Anything that makes you breathe hard and gets your heart pumping is exercise. Here are some tips: 
¨ Walk to do errands or to take your child to school or the bus. ¨ Go for a family bike ride after dinner instead of watching TV. Where can you learn more? Go to http://ronaldGeogoerbiju.info/. Enter R018 in the search box to learn more about \"A Healthy Lifestyle: Care Instructions. \" Current as of: July 26, 2016 Content Version: 11.2 © 4214-3419 [employer]. Care instructions adapted under license by [employer] (which disclaims liability or warranty for this information). If you have questions about a medical condition or this instruction, always ask your healthcare professional. [place] [address] any warranty or liability for your use of this information. Introducing [facility]! Dear Preston Chinchilla: 
Thank you for requesting a [employer] account. Our records indicate that you already have an active [employer] account. You can access your account anytime at https://Hop Skip Connect. [employer] (TM[employer])/Hop Skip Connect Did you know that you can access your hospital and ER discharge instructions at any time in [employer]? You can also review all of your test results from your hospital stay or ER visit. Additional Information If you have questions, please visit the Frequently Asked Questions section of the [employer] website at [email or web address] [email or web address]/. Remember, [employer] is NOT to be used for urgent needs. For medical emergencies, dial 911. Now available from your iPhone and Android! Please provide this summary of care documentation to your next provider. Your primary care clinician is listed as Ole Peraza. If you have any questions after today's visit, please call [phone].

## 2017-06-12 NOTE — LETTER
6/12/2017 10:55 AM 
 
Patient:    Ana Maria Silva YOB: 1945 Date of Visit:    6/12/2017 Dear Nola Graham MD 
 
Thank you for referring Mr. Yeny Reyes to me for evaluation/treatment. Below are the relevant portions of my assessment and plan of care. Neurology Consultation Note REFERRED BY: 
Nola Graham MD 
 
06/12/17 Chief Complaint Patient presents with  New Patient Numbness in neck/weakness in right arm HISTORY OF PRESENT ILLNESS Ana Maria Silva is a 67 y.o. male who presented to the neurology office for management of right arm numbness. He states that the first episode happened on 3 April. He was taking off his shirt when all of a sudden his right side of the neck, right arm and forearm went numb. It lasted for a few minutes and then resolved. He also had some associated right hand weakness. During the second episode he was reaching up and this happened feet. All of the episodes lasted just for a few minutes. There is no seizure-like activity, any numbness of the face or any speech change. He was started on aspirin 325 mg a day. He already takes pravastatin 40 mg daily. He does also have hypertension and takes lisinopril 20 mg a day. Current Outpatient Prescriptions Medication Sig  AMOXICILLIN PO Take  by mouth.  tadalafil (CIALIS) 5 mg tablet TAKE 1 TABLET daily  pravastatin (PRAVACHOL) 40 mg tablet TAKE 1 TABLET DAILY  aspirin 81 mg chewable tablet Take 1 Tab by mouth daily.  lisinopril (PRINIVIL, ZESTRIL) 20 mg tablet TAKE 1 TABLET DAILY No current facility-administered medications for this visit. Allergies Allergen Reactions  Macrobid [Nitrofurantoin Monohyd/M-Cryst] Unknown (comments)  Pravastatin Myalgia  Tetracycline Unknown (comments) Past Medical History:  
Diagnosis Date  Arthritis   
 knee  Cancer ([facility] [address] skin  Chronic obstructive pulmonary disease ([facility] [address]  Hypercholesterolemia  Hypertension  Ill-defined condition   
 lipids  Smoking 1/22/2010 Stopped 04/2000 Past Surgical History:  
Procedure Laterality Date  COLORECTAL SCRN; HI RISK IND  1/12/2016  ENDOSCOPY, COLON, DIAGNOSTIC  07/16/2007 Dr Darío Erazo polyp repeat 07/2010  HX ORTHOPAEDIC Right 3/2016  
 arthroscopic; torn medial meniscus Family History Problem Relation Age of Onset  Hypertension Mother  Thyroid Disease Mother  Kidney Disease Father  Cancer Father   
  skin  Diabetes Father  Cancer Paternal Aunt   
  lung  Cancer Paternal Uncle   
  skin Social History Substance Use Topics  Smoking status: Former Smoker Packs/day: 2.00 Years: 40.00 Quit date: 4/1/2000  Smokeless tobacco: Never Used  Alcohol use 0.0 oz/week 2 - 3 Cans of beer per week REVIEW OF SYSTEMS:  
A ten system review of constitutional, cardiovascular, respiratory, musculoskeletal, endocrine, skin, SHEENT, genitourinary, psychiatric and neurologic systems was obtained and is unremarkable with the exception of recent fall, joint pain EXAMINATION:  
Visit Vitals  /80  Pulse 68  Ht 5' 10\" (1.778 m)  Wt 173 lb 9.6 oz (78.7 kg)  SpO2 99%  BMI 24.91 kg/m2 General:  
General appearance: Pt is in no acute distress Distal pulses are preserved Fundoscopic Exam: Normal 
 
Neurological Examination:  
Mental Status: AAO x3. Speech is fluent. Follows commands, has normal fund of knowledge, attention, short term recall, comprehension and insight. Cranial Nerves: Visual fields are full. PERRL, Extraocular movements are full. Facial sensation intact V1- V3. Facial movement intact, symmetric. Hearing intact to conversation. Palate elevates symmetrically. Shoulder shrug symmetric. Tongue midline. Motor: Strength is 5/5 in all 4 ext. No atrophy. Tone: Normal 
 
Sensation: Normal to light touch Reflexes: DTRs 2+ throughout. Plantar responses downgoing. Coordination/Cerebellar: Intact to finger-nose-finger Gait: Romberg is negative and casual gait is normal.  
 
Skin: No significant bruising or lacerations. Laboratory review:  
Results for orders placed or performed in visit on 04/11/17 METABOLIC PANEL, COMPREHENSIVE Result Value Ref Range Glucose 91 65 - 99 mg/dL BUN 15 8 - 27 mg/dL Creatinine 1.06 0.76 - 1.27 mg/dL GFR est non-AA 70 >59 mL/min/1.73 GFR est AA 81 >59 mL/min/1.73  
 BUN/Creatinine ratio 14 10 - 24 Sodium 140 134 - 144 mmol/L Potassium 4.4 3.5 - 5.2 mmol/L Chloride 103 96 - 106 mmol/L  
 CO2 21 18 - 29 mmol/L Calcium 9.1 8.6 - 10.2 mg/dL Protein, total 6.6 6.0 - 8.5 g/dL Albumin 3.9 3.5 - 4.8 g/dL GLOBULIN, TOTAL 2.7 1.5 - 4.5 g/dL A-G Ratio 1.4 1.2 - 2.2 Bilirubin, total 0.6 0.0 - 1.2 mg/dL Alk. phosphatase 107 39 - 117 IU/L  
 AST (SGOT) 24 0 - 40 IU/L  
 ALT (SGPT) 16 0 - 44 IU/L  
LIPID PANEL Result Value Ref Range Cholesterol, total 189 100 - 199 mg/dL Triglyceride 73 0 - 149 mg/dL HDL Cholesterol 47 >39 mg/dL VLDL, calculated 15 5 - 40 mg/dL LDL, calculated 127 (H) 0 - 99 mg/dL CBC WITH AUTOMATED DIFF Result Value Ref Range WBC 6.2 3.4 - 10.8 x10E3/uL  
 RBC 4.66 4.14 - 5.80 x10E6/uL HGB 14.0 12.6 - 17.7 g/dL HCT 42.9 37.5 - 51.0 % MCV 92 79 - 97 fL  
 MCH 30.0 26.6 - 33.0 pg  
 MCHC 32.6 31.5 - 35.7 g/dL  
 RDW 14.2 12.3 - 15.4 % PLATELET 266 862 - 933 x10E3/uL NEUTROPHILS 57 % Lymphocytes 28 % MONOCYTES 9 % EOSINOPHILS 5 % BASOPHILS 1 %  
 ABS. NEUTROPHILS 3.6 1.4 - 7.0 x10E3/uL Abs Lymphocytes 1.7 0.7 - 3.1 x10E3/uL  
 ABS. MONOCYTES 0.5 0.1 - 0.9 x10E3/uL  
 ABS. EOSINOPHILS 0.3 0.0 - 0.4 x10E3/uL  
 ABS. BASOPHILS 0.1 0.0 - 0.2 x10E3/uL IMMATURE GRANULOCYTES 0 %  
 ABS. IMM. GRANS. 0.0 0.0 - 0.1 x10E3/uL CVD REPORT Result Value Ref Range INTERPRETATION Note Imaging review: 
5/19/2017 Carotid ultrasound Bilateral less than 50% stenosis of ICA 
 
5/11/2017 X-ray cervical spine Degenerative spondylosis C4 through C6 Documentation review: I reviewed the primary care physician's note from 5/11/2017. The patient presents with lack of sensation in the right arm and forearm. He has pain in his right neck when this happens. His arms feel weak. Strength is normal on examination. X-ray of cervical spine shows degenerative spondylosis C4 through C6. Will order carotid Doppler and start the patient on aspirin 81 mg a day. Assessment/Plan:  
Leidy Dominguez is a 67 y.o. male who presented to the neurology office for management of right arm numbness. He has had 3 episodes lasting for a few minutes. He is presently taking aspirin 325 mg a day and he has not had any more episodes since then. His carotid ultrasound is good. I do plan to get MRI of the brain, MRA of the head and MRI of the cervical spine. His last LDL was elevated and goal LDL is less than 70. I recommend increasing the dosage of pravastatin but the patient wants to try exercise and diet modification first.  I do also plan to get an EMG/nerve conduction study to look for a peripheral right upper extremity etiology. Overall the symptoms could be because of a TIA versus cervical etiology. ICD-10-CM ICD-9-CM 1. Right arm numbness R20.2 782.0 MRI BRAIN WO CONT  
   MRA BRAIN WO CONT  
   MRI CERV SPINE WO CONT  
   EMG LIMITED  
   2D ECHO COMPLETE ADULT (TTE) W OR WO CONTR 2. Mixed hyperlipidemia E78.2 272.2 3. Cerebral ischemia  I67.82 437.1 MRA BRAIN WO CONT  
   2D ECHO COMPLETE ADULT (TTE) W OR WO CONTR  
4. Other cerebral infarction ([address]  I63.8 434.91 2D ECHO COMPLETE ADULT (TTE) W OR WO CONTR Thank you for allowing me to participate in the care of Mr. Ben Sanders. Please feel free to contact me if you have any questions.  
 
Amirah Foster MD 
 Neurologist 
 
CC: Elvia Cortez MD 
Fax: [phone] This note was created using voice recognition software. Despite editing, there may be syntax errors. This note will not be viewable in [address] If you have questions, please do not hesitate to call me. I look forward to following Mr. Adrianna Avalos along with you. Sincerely, Thony Shoemaker MD

## 2017-06-12 NOTE — PATIENT INSTRUCTIONS
1. MRI brain, MRA head, MRI cervical spine  2. EMG/nerve conduction study of the right upper extremity  3. Continue aspirin  4. Diet and exercise. Will recheck lipid panel in 3 months and if LDL is still elevated, will increase pravastatin  5. Follow-up in 3 months      A Healthy Lifestyle: Care Instructions  Your Care Instructions  A healthy lifestyle can help you feel good, stay at a healthy weight, and have plenty of energy for both work and play. A healthy lifestyle is something you can share with your whole family. A healthy lifestyle also can lower your risk for serious health problems, such as high blood pressure, heart disease, and diabetes. You can follow a few steps listed below to improve your health and the health of your family. Follow-up care is a key part of your treatment and safety. Be sure to make and go to all appointments, and call your doctor if you are having problems. Its also a good idea to know your test results and keep a list of the medicines you take. How can you care for yourself at home? · Do not eat too much sugar, fat, or fast foods. You can still have dessert and treats now and then. The goal is moderation. · Start small to improve your eating habits. Pay attention to portion sizes, drink less juice and soda pop, and eat more fruits and vegetables. ¨ Eat a healthy amount of food. A 3-ounce serving of meat, for example, is about the size of a deck of cards. Fill the rest of your plate with vegetables and whole grains. ¨ Limit the amount of soda and sports drinks you have every day. Drink more water when you are thirsty. ¨ Eat at least 5 servings of fruits and vegetables every day. It may seem like a lot, but it is not hard to reach this goal. A serving or helping is 1 piece of fruit, 1 cup of vegetables, or 2 cups of leafy, raw vegetables. Have an apple or some carrot sticks as an afternoon snack instead of a candy bar.  Try to have fruits and/or vegetables at every meal.  · Make exercise part of your daily routine. You may want to start with simple activities, such as walking, bicycling, or slow swimming. Try to be active 30 to 60 minutes every day. You do not need to do all 30 to 60 minutes all at once. For example, you can exercise 3 times a day for 10 or 20 minutes. Moderate exercise is safe for most people, but it is always a good idea to talk to your doctor before starting an exercise program.  · Keep moving. Bernardo Ge the lawn, work in the garden, or [employer]. Take the stairs instead of the elevator at work. · If you smoke, quit. People who smoke have an increased risk for heart attack, stroke, cancer, and other lung illnesses. Quitting is hard, but there are ways to boost your chance of quitting tobacco for good. ¨ Use nicotine gum, patches, or lozenges. ¨ Ask your doctor about stop-smoking programs and medicines. ¨ Keep trying. In addition to reducing your risk of diseases in the future, you will notice some benefits soon after you stop using tobacco. If you have shortness of breath or asthma symptoms, they will likely get better within a few weeks after you quit. · Limit how much alcohol you drink. Moderate amounts of alcohol (up to 2 drinks a day for men, 1 drink a day for women) are okay. But drinking too much can lead to liver problems, high blood pressure, and other health problems. Family health  If you have a family, there are many things you can do together to improve your health. · Eat meals together as a family as often as possible. · Eat healthy foods. This includes fruits, vegetables, lean meats and dairy, and whole grains. · Include your family in your fitness plan. Most people think of activities such as jogging or tennis as the way to fitness, but there are many ways you and your family can be more active. Anything that makes you breathe hard and gets your heart pumping is exercise.  Here are some tips:  ¨ Walk to do errands or to take your child to school or the bus. ¨ Go for a family bike ride after dinner instead of watching TV. Where can you learn more? Go to http://ronald-biju.info/. Enter A620 in the search box to learn more about \"A Healthy Lifestyle: Care Instructions. \"  Current as of: July 26, 2016  Content Version: 11.2  © 7039-0579 [employer]. Care instructions adapted under license by [employer] (which disclaims liability or warranty for this information). If you have questions about a medical condition or this instruction, always ask your healthcare professional. [place] [address] any warranty or liability for your use of this information.

## 2017-06-12 NOTE — PROGRESS NOTES
Neurology Consultation Note  REFERRED BY:  Darell Justin MD    06/12/17    Chief Complaint   Patient presents with    New Patient     Numbness in neck/weakness in right arm       HISTORY OF PRESENT ILLNESS  Ruth Peters is a 67 y.o. male who presented to the neurology office for management of right arm numbness. He states that the first episode happened on 3 April. He was taking off his shirt when all of a sudden his right side of the neck, right arm and forearm went numb. It lasted for a few minutes and then resolved. He also had some associated right hand weakness. During the second episode he was reaching up and this happened feet. All of the episodes lasted just for a few minutes. There is no seizure-like activity, any numbness of the face or any speech change. He was started on aspirin 325 mg a day. He already takes pravastatin 40 mg daily. He does also have hypertension and takes lisinopril 20 mg a day. Current Outpatient Prescriptions   Medication Sig    AMOXICILLIN PO Take  by mouth.  tadalafil (CIALIS) 5 mg tablet TAKE 1 TABLET daily    pravastatin (PRAVACHOL) 40 mg tablet TAKE 1 TABLET DAILY    aspirin 81 mg chewable tablet Take 1 Tab by mouth daily.  lisinopril (PRINIVIL, ZESTRIL) 20 mg tablet TAKE 1 TABLET DAILY     No current facility-administered medications for this visit.       Allergies   Allergen Reactions    Macrobid [Nitrofurantoin Monohyd/M-Cryst] Unknown (comments)    Pravastatin Myalgia    Tetracycline Unknown (comments)     Past Medical History:   Diagnosis Date    Arthritis     knee    Cancer ([facility] [address]     skin    Chronic obstructive pulmonary disease ([facility] [address]     Hypercholesterolemia     Hypertension     Ill-defined condition     lipids    Smoking 1/22/2010    Stopped 04/2000      Past Surgical History:   Procedure Laterality Date    COLORECTAL SCRN; HI RISK IND  1/12/2016         ENDOSCOPY, COLON, DIAGNOSTIC  07/16/2007    Dr Shon Hare polyp repeat 07/2010    HX ORTHOPAEDIC Right 3/2016    arthroscopic; torn medial meniscus     Family History   Problem Relation Age of Onset    Hypertension Mother     Thyroid Disease Mother     Kidney Disease Father     Cancer Father      skin    Diabetes Father     Cancer Paternal Aunt      lung    Cancer Paternal Uncle      skin     Social History   Substance Use Topics    Smoking status: Former Smoker     Packs/day: 2.00     Years: 40.00     Quit date: 4/1/2000    Smokeless tobacco: Never Used    Alcohol use 0.0 oz/week     2 - 3 Cans of beer per week       REVIEW OF SYSTEMS:   A ten system review of constitutional, cardiovascular, respiratory, musculoskeletal, endocrine, skin, SHEENT, genitourinary, psychiatric and neurologic systems was obtained and is unremarkable with the exception of recent fall, joint pain     EXAMINATION:   Visit Vitals    /80    Pulse 68    Ht 5' 10\" (1.778 m)    Wt 173 lb 9.6 oz (78.7 kg)    SpO2 99%    BMI 24.91 kg/m2        General:   General appearance: Pt is in no acute distress   Distal pulses are preserved  Fundoscopic Exam: Normal    Neurological Examination:   Mental Status: AAO x3. Speech is fluent. Follows commands, has normal fund of knowledge, attention, short term recall, comprehension and insight. Cranial Nerves: Visual fields are full. PERRL, Extraocular movements are full. Facial sensation intact V1- V3. Facial movement intact, symmetric. Hearing intact to conversation. Palate elevates symmetrically. Shoulder shrug symmetric. Tongue midline. Motor: Strength is 5/5 in all 4 ext. No atrophy. Tone: Normal    Sensation: Normal to light touch    Reflexes: DTRs 2+ throughout. Plantar responses downgoing. Coordination/Cerebellar: Intact to finger-nose-finger     Gait: Romberg is negative and casual gait is normal.     Skin: No significant bruising or lacerations.     Laboratory review:   Results for orders placed or performed in visit on 04/11/17 METABOLIC PANEL, COMPREHENSIVE   Result Value Ref Range    Glucose 91 65 - 99 mg/dL    BUN 15 8 - 27 mg/dL    Creatinine 1.06 0.76 - 1.27 mg/dL    GFR est non-AA 70 >59 mL/min/1.73    GFR est AA 81 >59 mL/min/1.73    BUN/Creatinine ratio 14 10 - 24    Sodium 140 134 - 144 mmol/L    Potassium 4.4 3.5 - 5.2 mmol/L    Chloride 103 96 - 106 mmol/L    CO2 21 18 - 29 mmol/L    Calcium 9.1 8.6 - 10.2 mg/dL    Protein, total 6.6 6.0 - 8.5 g/dL    Albumin 3.9 3.5 - 4.8 g/dL    GLOBULIN, TOTAL 2.7 1.5 - 4.5 g/dL    A-G Ratio 1.4 1.2 - 2.2    Bilirubin, total 0.6 0.0 - 1.2 mg/dL    Alk. phosphatase 107 39 - 117 IU/L    AST (SGOT) 24 0 - 40 IU/L    ALT (SGPT) 16 0 - 44 IU/L   LIPID PANEL   Result Value Ref Range    Cholesterol, total 189 100 - 199 mg/dL    Triglyceride 73 0 - 149 mg/dL    HDL Cholesterol 47 >39 mg/dL    VLDL, calculated 15 5 - 40 mg/dL    LDL, calculated 127 (H) 0 - 99 mg/dL   CBC WITH AUTOMATED DIFF   Result Value Ref Range    WBC 6.2 3.4 - 10.8 x10E3/uL    RBC 4.66 4.14 - 5.80 x10E6/uL    HGB 14.0 12.6 - 17.7 g/dL    HCT 42.9 37.5 - 51.0 %    MCV 92 79 - 97 fL    MCH 30.0 26.6 - 33.0 pg    MCHC 32.6 31.5 - 35.7 g/dL    RDW 14.2 12.3 - 15.4 %    PLATELET 126 480 - 891 x10E3/uL    NEUTROPHILS 57 %    Lymphocytes 28 %    MONOCYTES 9 %    EOSINOPHILS 5 %    BASOPHILS 1 %    ABS. NEUTROPHILS 3.6 1.4 - 7.0 x10E3/uL    Abs Lymphocytes 1.7 0.7 - 3.1 x10E3/uL    ABS. MONOCYTES 0.5 0.1 - 0.9 x10E3/uL    ABS. EOSINOPHILS 0.3 0.0 - 0.4 x10E3/uL    ABS. BASOPHILS 0.1 0.0 - 0.2 x10E3/uL    IMMATURE GRANULOCYTES 0 %    ABS. IMM. GRANS. 0.0 0.0 - 0.1 x10E3/uL   CVD REPORT   Result Value Ref Range    INTERPRETATION Note        Imaging review:  5/19/2017  Carotid ultrasound  Bilateral less than 50% stenosis of ICA    5/11/2017  X-ray cervical spine  Degenerative spondylosis C4 through C6    Documentation review:  I reviewed the primary care physician's note from 5/11/2017.   The patient presents with lack of sensation in the right arm and forearm. He has pain in his right neck when this happens. His arms feel weak. Strength is normal on examination. X-ray of cervical spine shows degenerative spondylosis C4 through C6. Will order carotid Doppler and start the patient on aspirin 81 mg a day. Assessment/Plan:   Ekta Pierce is a 67 y.o. male who presented to the neurology office for management of right arm numbness. He has had 3 episodes lasting for a few minutes. He is presently taking aspirin 325 mg a day and he has not had any more episodes since then. His carotid ultrasound is good. I do plan to get MRI of the brain, MRA of the head and MRI of the cervical spine. His last LDL was elevated and goal LDL is less than 70. I recommend increasing the dosage of pravastatin but the patient wants to try exercise and diet modification first.  I do also plan to get an EMG/nerve conduction study to look for a peripheral right upper extremity etiology. Overall the symptoms could be because of a TIA versus cervical etiology. ICD-10-CM ICD-9-CM    1. Right arm numbness R20.2 782.0 MRI BRAIN WO CONT      MRA BRAIN WO CONT      MRI CERV SPINE WO CONT      EMG LIMITED      2D ECHO COMPLETE ADULT (TTE) W OR WO CONTR   2. Mixed hyperlipidemia E78.2 272.2    3. Cerebral ischemia  I67.82 437.1 MRA BRAIN WO CONT      2D ECHO COMPLETE ADULT (TTE) W OR WO CONTR   4. Other cerebral infarction ([address]  I63.8 434.91 2D ECHO COMPLETE ADULT (TTE) W OR WO CONTR      Thank you for allowing me to participate in the care of Mr. Angela Joiner. Please feel free to contact me if you have any questions. Andrea Celis MD  Neurologist    CC: Amaury Santana MD  Fax: [phone]    This note was created using voice recognition software. Despite editing, there may be syntax errors. This note will not be viewable in [address]

## 2017-06-14 ENCOUNTER — HOSPITAL ENCOUNTER (OUTPATIENT)
Dept: PHYSICAL THERAPY | Age: 72
Discharge: HOME | End: 2017-06-14
Payer: MEDICARE

## 2017-06-14 PROCEDURE — 97110 THERAPEUTIC EXERCISES: CPT

## 2017-06-14 NOTE — PROGRESS NOTES
PT DAILY TREATMENT NOTE - [facility] 2-15    Patient Name: Betito Son  Date:2017  : 1945  [x]  Patient  Verified  [place]or: [place] MED[place] / Plan: [place] MEDICARE PART A & B / Product Type: Medicare /    In time: 2:00 PM  Out time: 3:10 PM  Total Treatment Time (min): 70 minutes  Total Timed Codes (min): 60 minutes  1:1 Treatment Time ( only): 45 minutes   Visit #: 3     Treatment Area: Bilateral primary osteoarthritis of knee [M17.0]  Other symptoms and signs involving the musculoskeletal system [R29.898]    SUBJECTIVE  Pain Level (0-10 scale): 0/10  Any medication changes, allergies to medications, adverse drug reactions, diagnosis change, or new procedure performed?: [x] No    [] Yes (see summary sheet for update)  Subjective functional status/changes:   [] No changes reported  The Pt reports that his knees are feeling better, but he strained a muscle in the right hip while trying to paint something while on his ladder. He was leaning out trying to reach a spot and felt the muscle pull. He has pain with any lateral movements and has been icing the hip frequently. He has tried to do his exercises, but some of the exercises cause him significant right hip pain so he has not been doing those. OBJECTIVE    Modality rationale: decrease edema, decrease inflammation and decrease pain to improve the patients ability to perform ADLs with less pain or discomfort.    Min Type Additional Details    [] Estim: []Att   []Unatt        []TENS instruct                  []IFC  []Premod   []NMES                     []Other:  []w/US   []w/ice   []w/heat  Position:  Location:    []  Traction: [] Cervical       []Lumbar                       [] Prone          []Supine                       []Intermittent   []Continuous Lbs:  [] before manual  [] after manual  []w/heat    []  Ultrasound: []Continuous   [] Pulsed at:                           []1MHz   []3MHz Location:  W/cm2:    [] Paraffin         Location:   []w/heat   10 [x]  Ice     []  Heat  []  Ice massage Position: Left sidelying  Location: Right hip    []  Laser  []  Other: Position:  Location:      []  Vasopneumatic Device Pressure:       [] lo [] med [] hi   Temperature:      [x] Skin assessment post-treatment:  [x]intact []redness- no adverse reaction    []redness  adverse reaction:     60 min Therapeutic Exercise:  [x] See flow sheet :   Rationale: increase ROM, increase strength, improve coordination, improve balance and increase proprioception to improve the patients ability to ambulate and perform ADLs with less pain or discomfort. With   [x] TE   [] TA   [] neuro   [] other: Patient Education: [x] Review HEP    [] Progressed/Changed HEP based on:   [] positioning   [] body mechanics   [] transfers   [] heat/ice application    [] other:      Other Objective/Functional Measures:   -TTP along right glut med  -no pain with PA glides to lumbar spine  -no TTP along spinous processes or PSIS  -no TTP along right piriformis     Pain Level (0-10 scale) post treatment: 0/10    ASSESSMENT/Changes in Function:   The Pt is thought to have strained the right glut med. He was instructed to stop his strengthening exercises to allow that muscle to heal and then to reintroduce the exercises when it is no longer painful. Patient will continue to benefit from skilled PT services to modify and progress therapeutic interventions, address functional mobility deficits, address ROM deficits, address strength deficits, analyze and address soft tissue restrictions, analyze and cue movement patterns, analyze and modify body mechanics/ergonomics, assess and modify postural abnormalities and instruct in home and community integration to attain remaining goals. []  See Plan of Care  []  See progress note/recertification  []  See Discharge Summary         Progress towards goals / Updated goals:  Short Term Goals: To be accomplished in 2 treatments:  1.  The Pt will be independent and compliant with her HEP- met  Long Term Goals: To be accomplished in 5 treatments:  1. The Pt will score the MCII on his FOTO survey demonstrating improved overall function (45 to 51 points)- progressing  2. The Pt will be able to ascend and descend 1 standard flight of stairs with 0-2/10 pain to allow the Pt to be able to perform work duties with less pain- progressing  3.  The Pt will be able to perform 5 consecutive sit to stands with 0-2/10 pain to allow the Pt to transfer with less discomfort- progressing    PLAN  [x]  Upgrade activities as tolerated     [x]  Continue plan of care  [x]  Update interventions per flow sheet       []  Discharge due to:_  []  Other:_      Piper Adam, PT 6/14/2017  3:01 PM

## 2017-06-19 ENCOUNTER — APPOINTMENT (OUTPATIENT)
Dept: PHYSICAL THERAPY | Age: 72
End: 2017-06-19
Payer: MEDICARE

## 2017-06-22 ENCOUNTER — APPOINTMENT (OUTPATIENT)
Dept: PHYSICAL THERAPY | Age: 72
End: 2017-06-22
Payer: MEDICARE

## 2017-06-26 ENCOUNTER — APPOINTMENT (OUTPATIENT)
Dept: PHYSICAL THERAPY | Age: 72
End: 2017-06-26
Payer: MEDICARE

## 2017-07-06 ENCOUNTER — HOSPITAL ENCOUNTER (OUTPATIENT)
Dept: MRI IMAGING | Age: 72
Discharge: HOME | End: 2017-07-06
Attending: PSYCHIATRY & NEUROLOGY
Payer: MEDICARE

## 2017-07-06 ENCOUNTER — HOSPITAL ENCOUNTER (OUTPATIENT)
Dept: NON INVASIVE DIAGNOSTICS | Age: 72
Discharge: HOME | End: 2017-07-06
Attending: PSYCHIATRY & NEUROLOGY
Payer: MEDICARE

## 2017-07-06 ENCOUNTER — OFFICE VISIT (OUTPATIENT)
Dept: NEUROLOGY | Age: 72
End: 2017-07-06

## 2017-07-06 VITALS — HEART RATE: 66 BPM | DIASTOLIC BLOOD PRESSURE: 95 MMHG | SYSTOLIC BLOOD PRESSURE: 146 MMHG | OXYGEN SATURATION: 96 %

## 2017-07-06 DIAGNOSIS — R20.0 RIGHT ARM NUMBNESS: ICD-10-CM

## 2017-07-06 DIAGNOSIS — I67.82 CEREBRAL ISCHEMIA: ICD-10-CM

## 2017-07-06 DIAGNOSIS — I10 ESSENTIAL HYPERTENSION: ICD-10-CM

## 2017-07-06 DIAGNOSIS — G56.03 BILATERAL CARPAL TUNNEL SYNDROME: Primary | ICD-10-CM

## 2017-07-06 DIAGNOSIS — I63.89 OTHER CEREBRAL INFARCTION (HCC): ICD-10-CM

## 2017-07-06 DIAGNOSIS — M54.12 CERVICAL RADICULOPATHY AT C6: ICD-10-CM

## 2017-07-06 DIAGNOSIS — E78.2 MIXED HYPERLIPIDEMIA: ICD-10-CM

## 2017-07-06 PROCEDURE — 72141 MRI NECK SPINE W/O DYE: CPT

## 2017-07-06 PROCEDURE — 70551 MRI BRAIN STEM W/O DYE: CPT

## 2017-07-06 PROCEDURE — 93306 TTE W/DOPPLER COMPLETE: CPT

## 2017-07-06 PROCEDURE — 70544 MR ANGIOGRAPHY HEAD W/O DYE: CPT

## 2017-07-06 NOTE — LETTER
THE [facility] Neurology [place] [address] [address] [place], [address] [facility] Electrodiagnostic Study Report Test Date:  2017 Patient: Devora Ellington : 1945 Physician: Flavia Lemon M.D. ID#: [id number] SEX: Male Ref. Phys: Flavia Lemon M.D. Patient History / Exam: 
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 Query right cervical radiculopathy EMG & NCV Findings: 
Evaluation of the left median motor and the right median motor nerves showed prolonged distal onset latency (L4.8, R5.2 ms), normal amplitude (L7.2, R7.1 mV), and normal conduction velocity (Elbow-Wrist, L52, R51 m/s). The left ulnar motor nerve showed prolonged distal onset latency (3.2 ms), reduced amplitude (6.8 mV), normal conduction velocity (B Elbow-Wrist, 51 m/s), and normal conduction velocity (A Elbow-B Elbow, 53 m/s). The right ulnar motor nerve showed prolonged distal onset latency (3.2 ms), normal amplitude (9.0 mV), normal conduction velocity (B Elbow-Wrist, 54 m/s), normal conduction velocity (A Elbow-B Elbow, 56 m/s), prolonged distal onset latency (4.7 ms), normal amplitude (8.5 mV), normal conduction velocity (B Elbow-Wrist, 57 m/s), and normal conduction velocity (A Elbow-B Elbow, 56 m/s). The left median sensory and the right median sensory nerves showed prolonged distal peak latency (L4.4, R4.4 ms) and normal amplitude (L28.3, R28.5 µV). The right radial sensory nerve showed normal distal peak latency (2.5 ms) and reduced amplitude (8.0 µV). The left ulnar sensory and the right ulnar sensory nerves showed normal distal peak latency (L4.0, R3.7 ms) and normal amplitude (L9.5, R21.5 µV).   The left median/ulnar (palm) comparison and the right median/ulnar (palm) comparison nerves showed normal distal onset latency (Median Palm, L2.2, R2.1 ms), prolonged distal peak latency (Median Palm, L2.8, R2.9 ms), normal amplitude (Median Palm, L18.1, R16.9 µV), normal distal onset latency (Ulnar Palm, L1.8, R1.7 ms), prolonged distal peak latency (Ulnar Palm, L2.3, R2.4 ms), normal amplitude (Ulnar Palm, L8.6, R11.9 µV), and normal onset latency difference (Median Palm-Ulnar Palm, L0.4, R0.4 ms). All left vs. right side differences were within normal limits. All examined muscles (as indicated in the following table) showed no evidence of electrical instability. Impression: This is an abnormal study. 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 There is no electrophysiological evidence of a cervical radiculopathy on either side. ___________________________ S. Alex Johnson M.D. 
 
Nerve Conduction Studies Anti Sensory Summary Table Stim Site NR Peak (ms) Norm Peak (ms) P-T Amp (µV) Norm P-T Amp Site1 Site2 Dist (cm) Left Median Anti Sensory (2nd Digit) Wrist    4.4 <4 28.3 >13 Wrist 2nd Digit 14.0 Right Median Anti Sensory (2nd Digit) Wrist    4.4 <4 28.5 >13 Wrist 2nd Digit 14.0 Right Radial Anti Sensory (Base 1st Digit)  31.5°C Wrist    2.5 <2.8 8.0 >11 Wrist Base 1st Digit 10.0 Left Ulnar Anti Sensory (5th Digit) Wrist    4.0 <4.0 9.5 >9 Wrist 5th Digit 14.0 Right Ulnar Anti Sensory (5th Digit) Wrist    3.7 <4.0 21.5 >9 Wrist 5th Digit 14.0 Motor Summary Table Stim Site NR Onset (ms) Norm Onset (ms) O-P Amp (mV) Norm O-P Amp P-T Amp (mV) Site1 Site2 Dist (cm) William (m/s) Left Median Motor (Abd Poll Brev)  31.2°C Wrist    4.8 <4.5 7.2 >4.1 11.2 Wrist Abd Poll Brev 8.0 Elbow    10.1  6.9  10.7 Elbow Wrist 27.5 52 Right Median Motor (Abd Poll Brev)  29.3°C Wrist    5.2 <4.5 7.1 >4.1 11.0 Wrist Abd Poll Brev 8.0 Elbow    10.5  6.2  9.6 Elbow Wrist 27.0 51 Left Ulnar Motor (Abd Dig Minimi)  31.1°C Wrist    3.2 <3.1 6.8 >7.0 12.9 Wrist Abd Dig Minimi 8.0 B Elbow    7.0  6.2  12.2 B Elbow Wrist 19.5 51  
 A Elbow    8.9  6.1  12.1 A Elbow B Elbow 10.0 53 Right Ulnar Motor Run #1 (Abd Dig Minimi)  30.2°C Wrist    3.2 <3.1 9.0 >7.0 16.3 Wrist Abd Dig Minimi 8.0 B Elbow    7.3  8.9  16.3 B Elbow Wrist 22.0 54 A Elbow    9.1  8.8  16.2 A Elbow B Elbow 10.0 56 Right Ulnar Motor Run #2 (1st DI)  32°C Wrist    4.7 <3.1 8.5 >7.0 14.2 Wrist 1st DI 8.0 B Elbow    8.5  8.0  13.4 B Elbow Wrist 21.5 57 A Elbow    10.3  8.2  13.8 A Elbow B Elbow 10.0 56 Comparison Summary Table Stim Site NR Peak (ms) P-T Amp (µV) Site1 Site2 Dist (cm) Delta-0 (ms) Left Median/Ulnar Palm Comparison (Wrist)  31.3°C Median Palm    2.8 23.5 Median Palm Ulnar Palm 8.0 0.4 Ulnar Palm    2.3 5.7 Right Median/Ulnar Palm Comparison (Wrist)  34°C Median Palm    2.9 19.2 Median Palm Ulnar Palm 8.0 0.4 Ulnar Palm    2.4 7.8 EMG Side Muscle Nerve Root Ins Act Fibs Psw Recrt Duration Amp Poly Comment Right 1stDorInt Ulnar C8-T1 Nml Nml Nml Nml Nml Nml Nml Right Abd Poll Brev Median C8-T1 Nml Nml Nml Nml Nml Nml Nml Right PronatorTeres Median C6-7 Nml Nml Nml Nml Nml Nml Nml Right Biceps Musculocut C5-6 Nml Nml Nml Nml Nml Nml Nml Right Triceps Radial C6-7-8 Nml Nml Nml Nml Nml Nml Nml Right Deltoid Axillary C5-6 Nml Nml Nml Nml Nml Nml Nml Right Upper Cerv Parasp Rami C3,4 Nml Nml Nml Nml Nml Nml Nml Waveforms: 
    
 
    
 
    
 
    
 
 
7/6/2017 3:36 PM 
 
Patient:    Kanu Grace YOB: 1945 Date of Visit:    7/6/2017 Dear Jackie Farr MD 
 
Thank you for referring Mr. Grecia Camara to me for evaluation/treatment. Below are the relevant portions of my assessment and plan of care. Neurology follow-up note REFERRED BY: 
Jackie Farr MD 
 
07/06/17 Chief Complaint Patient presents with  Procedure Subjective Kanu Grace is a 67 y.o. male who presented to the neurology office for management of right arm numbness. To recap, he states that the first episode happened on 3 April. He was taking off his shirt when all of a sudden his right side of the neck, right arm and forearm went numb. It lasted for a few minutes and then resolved. He also had some associated right hand weakness. During the second episode he was reaching up and this happened feet. All of the episodes lasted just for a few minutes. There is no seizure-like activity, any numbness of the face or any speech change. He was started on aspirin 325 mg a day. He already takes pravastatin 40 mg daily. He does also have hypertension and takes lisinopril 20 mg a day. The patient did have MRI of the brain and cervical spine today and it did show degenerative changes with moderate to severe foraminal narrowing at C5-C6 level on the right. Current Outpatient Prescriptions Medication Sig  AMOXICILLIN PO Take  by mouth.  tadalafil (CIALIS) 5 mg tablet TAKE 1 TABLET daily  pravastatin (PRAVACHOL) 40 mg tablet TAKE 1 TABLET DAILY  aspirin 81 mg chewable tablet Take 1 Tab by mouth daily.  lisinopril (PRINIVIL, ZESTRIL) 20 mg tablet TAKE 1 TABLET DAILY No current facility-administered medications for this visit. REVIEW OF SYSTEMS:  
A ten system review of constitutional, cardiovascular, respiratory, musculoskeletal, endocrine, skin, SHEENT, genitourinary, psychiatric and neurologic systems was obtained and is unremarkable with the exception of recent fall, joint pain EXAMINATION:  
Visit Vitals  BP (!) 146/95 (BP 1 Location: Right arm, BP Patient Position: Sitting)  Pulse 66  SpO2 96% General:  
General appearance: Pt is in no acute distress Distal pulses are preserved Neurological Examination:  
Mental Status: AAO x3. Speech is fluent. Follows commands, has normal fund of knowledge, attention, short term recall, comprehension and insight. Cranial Nerves: Visual fields are full.  PERRL, Extraocular movements are full. Facial sensation intact V1- V3. Facial movement intact, symmetric. Hearing intact to conversation. Palate elevates symmetrically. Shoulder shrug symmetric. Tongue midline. Motor: Strength is 5/5 in all 4 ext. No atrophy. Tone: Normal 
 
Sensation: Normal to light touch Coordination/Cerebellar: Intact to finger-nose-finger Skin: No significant bruising or lacerations. Laboratory review:  
Results for orders placed or performed in visit on 04/11/17 METABOLIC PANEL, COMPREHENSIVE Result Value Ref Range Glucose 91 65 - 99 mg/dL BUN 15 8 - 27 mg/dL Creatinine 1.06 0.76 - 1.27 mg/dL GFR est non-AA 70 >59 mL/min/1.73 GFR est AA 81 >59 mL/min/1.73  
 BUN/Creatinine ratio 14 10 - 24 Sodium 140 134 - 144 mmol/L Potassium 4.4 3.5 - 5.2 mmol/L Chloride 103 96 - 106 mmol/L  
 CO2 21 18 - 29 mmol/L Calcium 9.1 8.6 - 10.2 mg/dL Protein, total 6.6 6.0 - 8.5 g/dL Albumin 3.9 3.5 - 4.8 g/dL GLOBULIN, TOTAL 2.7 1.5 - 4.5 g/dL A-G Ratio 1.4 1.2 - 2.2 Bilirubin, total 0.6 0.0 - 1.2 mg/dL Alk. phosphatase 107 39 - 117 IU/L  
 AST (SGOT) 24 0 - 40 IU/L  
 ALT (SGPT) 16 0 - 44 IU/L  
LIPID PANEL Result Value Ref Range Cholesterol, total 189 100 - 199 mg/dL Triglyceride 73 0 - 149 mg/dL HDL Cholesterol 47 >39 mg/dL VLDL, calculated 15 5 - 40 mg/dL LDL, calculated 127 (H) 0 - 99 mg/dL CBC WITH AUTOMATED DIFF Result Value Ref Range WBC 6.2 3.4 - 10.8 x10E3/uL  
 RBC 4.66 4.14 - 5.80 x10E6/uL HGB 14.0 12.6 - 17.7 g/dL HCT 42.9 37.5 - 51.0 % MCV 92 79 - 97 fL  
 MCH 30.0 26.6 - 33.0 pg  
 MCHC 32.6 31.5 - 35.7 g/dL  
 RDW 14.2 12.3 - 15.4 % PLATELET 669 460 - 694 x10E3/uL NEUTROPHILS 57 % Lymphocytes 28 % MONOCYTES 9 % EOSINOPHILS 5 % BASOPHILS 1 %  
 ABS. NEUTROPHILS 3.6 1.4 - 7.0 x10E3/uL Abs Lymphocytes 1.7 0.7 - 3.1 x10E3/uL  
 ABS. MONOCYTES 0.5 0.1 - 0.9 x10E3/uL  
 ABS. EOSINOPHILS 0.3 0.0 - 0.4 x10E3/uL ABS. BASOPHILS 0.1 0.0 - 0.2 x10E3/uL IMMATURE GRANULOCYTES 0 %  
 ABS. IMM. GRANS. 0.0 0.0 - 0.1 x10E3/uL CVD REPORT Result Value Ref Range INTERPRETATION Note Stroke labs: 
HgBA1c No results found for: HBA1C, GWE5AGQT 
LDL Lab Results Component Value Date/Time LDL, calculated 127 04/11/2017 08:23 AM  
  
Imaging review: 
7/6/2017 MRI of the brain without contrast 
No significant abnormality or acute process I reviewed the images MRA of the brain without contrast 
Negative MRA of the head MRI cervical spine without contrast 
Multilevel degenerative changes. Borderline central stenosis at C3-C4 and C5-C6. Foraminal narrowing most prominent at C5-C6 on the right. EMG/nerve conduction study This is an abnormal study. 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 There is no electrophysiological evidence of a cervical radiculopathy on either side. 5/19/2017 Carotid ultrasound Bilateral less than 50% stenosis of ICA 
 
5/11/2017 X-ray cervical spine Degenerative spondylosis C4 through C6 Documentation review: I reviewed the primary care physician's note from 5/11/2017. The patient presents with lack of sensation in the right arm and forearm. He has pain in his right neck when this happens. His arms feel weak. Strength is normal on examination. X-ray of cervical spine shows degenerative spondylosis C4 through C6. Will order carotid Doppler and start the patient on aspirin 81 mg a day. Assessment/Plan:  
Guillermo Rankin is a 67 y.o. male who presented to the neurology office for management of right arm numbness. He has had 3 episodes lasting for a few minutes. He is presently taking aspirin 325 mg a day and he has not had any more episodes since then. His carotid ultrasound is good.   The patient's MRA of the brain and cervical spine did show degenerative changes in the neck along with moderate to severe foraminal narrowing at C5-C6 level. His symptoms in the right upper extremities probably from cervical radiculopathy but he is not having any more symptoms at this point of time. It is hard to completely rule out TIA and I think specialty for his age patient optimize medication for stroke prevention. 1. 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 Patient is asymptomatic at this time. We will continue to follow. 2. Cervical radiculopathy at C6 MRI did show moderate to severe foraminal narrowing at C5-C6 level on the right. His symptoms of right upper extremity numbness was probably secondary to cervical radiculopathy. No more symptoms at this time. We will continue to observe. 3. Cerebral ischemia It is hard to completely rule out the possibility of a TIA causing his right upper extremity numbness although it is more likely coming from the neck. Recommend continuation of aspirin 81 mg a day 4. Mixed hyperlipidemia Goal LDL is less than 70. Continue statins. 5. Essential hypertension Goal blood pressure is less than 140/100. Blood pressure was elevated today. To be followed up by primary care physician. Follow-up in 3 months ICD-10-CM ICD-9-CM 1. Bilateral carpal tunnel syndrome G56.03 354.0 2. Cervical radiculopathy at C6 M54.12 723.4 3. Cerebral ischemia  I67.82 437.1 4. Mixed hyperlipidemia E78.2 272.2 5. Essential hypertension I10 401.9 Thank you for allowing me to participate in the care of Mr. Alivia Coburn. Please feel free to contact me if you have any questions. Feng Fox MD 
Neurologist 
 
CC: Milena Lynch MD 
Fax: [phone] This note was created using voice recognition software. Despite editing, there may be syntax errors. This note will not be viewable in [address] If you have questions, please do not hesitate to call me. I look forward to following  Kelsie Lewis along with you. Sincerely, Lily Dugan MD

## 2017-07-06 NOTE — PROCEDURES
[facility] Neurology  [address] [place] [address]    Electrodiagnostic Study Report    Test Date:  2017    Patient: Zoya Fulton : 1945 Physician: Emmanuel Hazel M.D.   ID#: [id number] SEX: Male Ref. Phys: Emmanuel Hazel M.D. Patient History / Exam:  Clark Allen 67 y.o. male presents with RUE numbness. Query right cervical radiculopathy    EMG & NCV Findings:  Evaluation of the left median motor and the right median motor nerves showed prolonged distal onset latency (L4.8, R5.2 ms), normal amplitude (L7.2, R7.1 mV), and normal conduction velocity (Elbow-Wrist, L52, R51 m/s). The left ulnar motor nerve showed prolonged distal onset latency (3.2 ms), reduced amplitude (6.8 mV), normal conduction velocity (B Elbow-Wrist, 51 m/s), and normal conduction velocity (A Elbow-B Elbow, 53 m/s). The right ulnar motor nerve showed prolonged distal onset latency (3.2 ms), normal amplitude (9.0 mV), normal conduction velocity (B Elbow-Wrist, 54 m/s), normal conduction velocity (A Elbow-B Elbow, 56 m/s), prolonged distal onset latency (4.7 ms), normal amplitude (8.5 mV), normal conduction velocity (B Elbow-Wrist, 57 m/s), and normal conduction velocity (A Elbow-B Elbow, 56 m/s). The left median sensory and the right median sensory nerves showed prolonged distal peak latency (L4.4, R4.4 ms) and normal amplitude (L28.3, R28.5 µV). The right radial sensory nerve showed normal distal peak latency (2.5 ms) and reduced amplitude (8.0 µV). The left ulnar sensory and the right ulnar sensory nerves showed normal distal peak latency (L4.0, R3.7 ms) and normal amplitude (L9.5, R21.5 µV).   The left median/ulnar (palm) comparison and the right median/ulnar (palm) comparison nerves showed normal distal onset latency (Median Palm, L2.2, R2.1 ms), prolonged distal peak latency (Median Palm, L2.8, R2.9 ms), normal amplitude (Median Palm, L18.1, R16.9 µV), normal distal onset latency (Ulnar Palm, L1.8, R1.7 ms), prolonged distal peak latency (Ulnar Palm, L2.3, R2.4 ms), normal amplitude (Ulnar Palm, L8.6, R11.9 µV), and normal onset latency difference (Median Palm-Ulnar Palm, L0.4, R0.4 ms). All left vs. right side differences were within normal limits. All examined muscles (as indicated in the following table) showed no evidence of electrical instability. Impression: This is an abnormal study. 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 There is no electrophysiological evidence of a cervical radiculopathy on either side. ___________________________  S.  Carmita Burgess M.D.    Nerve Conduction Studies  Anti Sensory Summary Table     Stim Site NR Peak (ms) Norm Peak (ms) P-T Amp (µV) Norm P-T Amp Site1 Site2 Dist (cm)   Left Median Anti Sensory (2nd Digit)   Wrist    4.4 <4 28.3 >13 Wrist 2nd Digit 14.0   Right Median Anti Sensory (2nd Digit)   Wrist    4.4 <4 28.5 >13 Wrist 2nd Digit 14.0   Right Radial Anti Sensory (Base 1st Digit)  31.5°C   Wrist    2.5 <2.8 8.0 >11 Wrist Base 1st Digit 10.0   Left Ulnar Anti Sensory (5th Digit)   Wrist    4.0 <4.0 9.5 >9 Wrist 5th Digit 14.0   Right Ulnar Anti Sensory (5th Digit)   Wrist    3.7 <4.0 21.5 >9 Wrist 5th Digit 14.0     Motor Summary Table     Stim Site NR Onset (ms) Norm Onset (ms) O-P Amp (mV) Norm O-P Amp P-T Amp (mV) Site1 Site2 Dist (cm) William (m/s)   Left Median Motor (Abd Poll Brev)  31.2°C   Wrist    4.8 <4.5 7.2 >4.1 11.2 Wrist Abd Poll Brev 8.0    Elbow    10.1  6.9  10.7 Elbow Wrist 27.5 52   Right Median Motor (Abd Poll Brev)  29.3°C   Wrist    5.2 <4.5 7.1 >4.1 11.0 Wrist Abd Poll Brev 8.0    Elbow    10.5  6.2  9.6 Elbow Wrist 27.0 51   Left Ulnar Motor (Abd Dig Minimi)  31.1°C   Wrist    3.2 <3.1 6.8 >7.0 12.9 Wrist Abd Dig Minimi 8.0    B Elbow    7.0  6.2  12.2 B Elbow Wrist 19.5 51   A Elbow    8.9  6.1  12.1 A Elbow B Elbow 10.0 53   Right Ulnar Motor Run #1 (Abd Dig Minimi)  30.2°C   Wrist    3.2 <3.1 9.0 >7.0 16.3 Wrist Abd Dig Minimi 8.0    B Elbow    7.3  8.9  16.3 B Elbow Wrist 22.0 54   A Elbow    9.1  8.8  16.2 A Elbow B Elbow 10.0 56   Right Ulnar Motor Run #2 (1st DI)  32°C   Wrist    4.7 <3.1 8.5 >7.0 14.2 Wrist 1st DI 8.0    B Elbow    8.5  8.0  13.4 B Elbow Wrist 21.5 57   A Elbow    10.3  8.2  13.8 A Elbow B Elbow 10.0 56     Comparison Summary Table     Stim Site NR Peak (ms) P-T Amp (µV) Site1 Site2 Dist (cm) Delta-0 (ms)   Left Median/Ulnar Palm Comparison (Wrist)  31.3°C   Median Palm    2.8 23.5 Median Palm Ulnar Palm 8.0 0.4   Ulnar Palm    2.3 5.7       Right Median/Ulnar Palm Comparison (Wrist)  34°C   Median Palm    2.9 19.2 Median Palm Ulnar Palm 8.0 0.4   Ulnar Palm    2.4 7.8           EMG     Side Muscle Nerve Root Ins Act Fibs Psw Recrt Duration Amp Poly Comment   Right 1stDorInt Ulnar C8-T1 Nml Nml Nml Nml Nml Nml Nml    Right Abd Poll Brev Median C8-T1 Nml Nml Nml Nml Nml Nml Nml    Right PronatorTeres Median C6-7 Nml Nml Nml Nml Nml Nml Nml    Right Biceps Musculocut C5-6 Nml Nml Nml Nml Nml Nml Nml    Right Triceps Radial C6-7-8 Nml Nml Nml Nml Nml Nml Nml    Right Deltoid Axillary C5-6 Nml Nml Nml Nml Nml Nml Nml    Right Upper Cerv Parasp Rami C3,4 Nml Nml Nml Nml Nml Nml Nml      Waveforms:

## 2017-07-06 NOTE — PROGRESS NOTES
Neurology follow-up note  REFERRED BY:  Sam Swain MD    07/06/17    Chief Complaint   Patient presents with    Procedure       Subjective  Guillermo Rankin is a 67 y.o. male who presented to the neurology office for management of right arm numbness. To recap, he states that the first episode happened on 3 April. He was taking off his shirt when all of a sudden his right side of the neck, right arm and forearm went numb. It lasted for a few minutes and then resolved. He also had some associated right hand weakness. During the second episode he was reaching up and this happened feet. All of the episodes lasted just for a few minutes. There is no seizure-like activity, any numbness of the face or any speech change. He was started on aspirin 325 mg a day. He already takes pravastatin 40 mg daily. He does also have hypertension and takes lisinopril 20 mg a day. The patient did have MRI of the brain and cervical spine today and it did show degenerative changes with moderate to severe foraminal narrowing at C5-C6 level on the right. Current Outpatient Prescriptions   Medication Sig    AMOXICILLIN PO Take  by mouth.  tadalafil (CIALIS) 5 mg tablet TAKE 1 TABLET daily    pravastatin (PRAVACHOL) 40 mg tablet TAKE 1 TABLET DAILY    aspirin 81 mg chewable tablet Take 1 Tab by mouth daily.  lisinopril (PRINIVIL, ZESTRIL) 20 mg tablet TAKE 1 TABLET DAILY     No current facility-administered medications for this visit.       REVIEW OF SYSTEMS:   A ten system review of constitutional, cardiovascular, respiratory, musculoskeletal, endocrine, skin, SHEENT, genitourinary, psychiatric and neurologic systems was obtained and is unremarkable with the exception of recent fall, joint pain     EXAMINATION:   Visit Vitals    BP (!) 146/95 (BP 1 Location: Right arm, BP Patient Position: Sitting)    Pulse 66    SpO2 96%        General:   General appearance: Pt is in no acute distress   Distal pulses are preserved    Neurological Examination:   Mental Status: AAO x3. Speech is fluent. Follows commands, has normal fund of knowledge, attention, short term recall, comprehension and insight. Cranial Nerves: Visual fields are full. PERRL, Extraocular movements are full. Facial sensation intact V1- V3. Facial movement intact, symmetric. Hearing intact to conversation. Palate elevates symmetrically. Shoulder shrug symmetric. Tongue midline. Motor: Strength is 5/5 in all 4 ext. No atrophy. Tone: Normal    Sensation: Normal to light touch     Coordination/Cerebellar: Intact to finger-nose-finger     Skin: No significant bruising or lacerations. Laboratory review:   Results for orders placed or performed in visit on 34/28/52   METABOLIC PANEL, COMPREHENSIVE   Result Value Ref Range    Glucose 91 65 - 99 mg/dL    BUN 15 8 - 27 mg/dL    Creatinine 1.06 0.76 - 1.27 mg/dL    GFR est non-AA 70 >59 mL/min/1.73    GFR est AA 81 >59 mL/min/1.73    BUN/Creatinine ratio 14 10 - 24    Sodium 140 134 - 144 mmol/L    Potassium 4.4 3.5 - 5.2 mmol/L    Chloride 103 96 - 106 mmol/L    CO2 21 18 - 29 mmol/L    Calcium 9.1 8.6 - 10.2 mg/dL    Protein, total 6.6 6.0 - 8.5 g/dL    Albumin 3.9 3.5 - 4.8 g/dL    GLOBULIN, TOTAL 2.7 1.5 - 4.5 g/dL    A-G Ratio 1.4 1.2 - 2.2    Bilirubin, total 0.6 0.0 - 1.2 mg/dL    Alk.  phosphatase 107 39 - 117 IU/L    AST (SGOT) 24 0 - 40 IU/L    ALT (SGPT) 16 0 - 44 IU/L   LIPID PANEL   Result Value Ref Range    Cholesterol, total 189 100 - 199 mg/dL    Triglyceride 73 0 - 149 mg/dL    HDL Cholesterol 47 >39 mg/dL    VLDL, calculated 15 5 - 40 mg/dL    LDL, calculated 127 (H) 0 - 99 mg/dL   CBC WITH AUTOMATED DIFF   Result Value Ref Range    WBC 6.2 3.4 - 10.8 x10E3/uL    RBC 4.66 4.14 - 5.80 x10E6/uL    HGB 14.0 12.6 - 17.7 g/dL    HCT 42.9 37.5 - 51.0 %    MCV 92 79 - 97 fL    MCH 30.0 26.6 - 33.0 pg    MCHC 32.6 31.5 - 35.7 g/dL    RDW 14.2 12.3 - 15.4 %    PLATELET 924 069 - 870 x10E3/uL NEUTROPHILS 57 %    Lymphocytes 28 %    MONOCYTES 9 %    EOSINOPHILS 5 %    BASOPHILS 1 %    ABS. NEUTROPHILS 3.6 1.4 - 7.0 x10E3/uL    Abs Lymphocytes 1.7 0.7 - 3.1 x10E3/uL    ABS. MONOCYTES 0.5 0.1 - 0.9 x10E3/uL    ABS. EOSINOPHILS 0.3 0.0 - 0.4 x10E3/uL    ABS. BASOPHILS 0.1 0.0 - 0.2 x10E3/uL    IMMATURE GRANULOCYTES 0 %    ABS. IMM. GRANS. 0.0 0.0 - 0.1 x10E3/uL   CVD REPORT   Result Value Ref Range    INTERPRETATION Note      Stroke labs:  HgBA1c    No results found for: HBA1C, GFT8OXTM  LDL   Lab Results   Component Value Date/Time    LDL, calculated 127 04/11/2017 08:23 AM      Imaging review:  7/6/2017  MRI of the brain without contrast  No significant abnormality or acute process  I reviewed the images    MRA of the brain without contrast  Negative MRA of the head    MRI cervical spine without contrast  Multilevel degenerative changes. Borderline central stenosis at C3-C4 and C5-C6. Foraminal narrowing most prominent at C5-C6 on the right. EMG/nerve conduction study  This is an abnormal study. 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 There is no electrophysiological evidence of a cervical radiculopathy on either side. 5/19/2017  Carotid ultrasound  Bilateral less than 50% stenosis of ICA    5/11/2017  X-ray cervical spine  Degenerative spondylosis C4 through C6    Documentation review:  I reviewed the primary care physician's note from 5/11/2017. The patient presents with lack of sensation in the right arm and forearm. He has pain in his right neck when this happens. His arms feel weak. Strength is normal on examination. X-ray of cervical spine shows degenerative spondylosis C4 through C6. Will order carotid Doppler and start the patient on aspirin 81 mg a day. Assessment/Plan:   Prince Villeda is a 67 y.o. male who presented to the neurology office for management of right arm numbness. He has had 3 episodes lasting for a few minutes.   He is presently taking aspirin 325 mg a day and he has not had any more episodes since then. His carotid ultrasound is good. The patient's MRA of the brain and cervical spine did show degenerative changes in the neck along with moderate to severe foraminal narrowing at C5-C6 level. His symptoms in the right upper extremities probably from cervical radiculopathy but he is not having any more symptoms at this point of time. It is hard to completely rule out TIA and I think specialty for his age patient optimize medication for stroke prevention. 1. 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 Patient is asymptomatic at this time. We will continue to follow. 2. Cervical radiculopathy at C6  MRI did show moderate to severe foraminal narrowing at C5-C6 level on the right. His symptoms of right upper extremity numbness was probably secondary to cervical radiculopathy. No more symptoms at this time. We will continue to observe. 3. Cerebral ischemia   It is hard to completely rule out the possibility of a TIA causing his right upper extremity numbness although it is more likely coming from the neck. Recommend continuation of aspirin 81 mg a day    4. Mixed hyperlipidemia  Goal LDL is less than 70. Continue statins. 5. Essential hypertension  Goal blood pressure is less than 140/100. Blood pressure was elevated today. To be followed up by primary care physician. Follow-up in 3 months      ICD-10-CM ICD-9-CM    1. Bilateral carpal tunnel syndrome G56.03 354.0    2. Cervical radiculopathy at C6 M54.12 723.4    3. Cerebral ischemia  I67.82 437.1    4. Mixed hyperlipidemia E78.2 272.2    5. Essential hypertension I10 401.9       Thank you for allowing me to participate in the care of Mr. Donald Mcgee. Please feel free to contact me if you have any questions.     Vane Chris MD  Neurologist    CC: Rashad De Santiago, MD  Fax: [phone]    This note was created using voice recognition software. Despite editing, there may be syntax errors. This note will not be viewable in [address]

## 2017-07-10 ENCOUNTER — TELEPHONE (OUTPATIENT)
Dept: NEUROLOGY | Age: 72
End: 2017-07-10

## 2017-07-10 NOTE — TELEPHONE ENCOUNTER
Received e-mail from patient who wanted to know when to start his aspirin 81 mg daily  At last visit he was taking aspirin 325 mg daily  Dr. Xiao Crouch please advise

## 2017-07-12 ENCOUNTER — HOSPITAL ENCOUNTER (OUTPATIENT)
Dept: PHYSICAL THERAPY | Age: 72
Discharge: HOME | End: 2017-07-12
Payer: MEDICARE

## 2017-07-12 ENCOUNTER — TELEPHONE (OUTPATIENT)
Dept: NEUROLOGY | Age: 72
End: 2017-07-12

## 2017-07-12 PROCEDURE — G8979 MOBILITY GOAL STATUS: HCPCS

## 2017-07-12 PROCEDURE — 97110 THERAPEUTIC EXERCISES: CPT

## 2017-07-12 PROCEDURE — G8978 MOBILITY CURRENT STATUS: HCPCS

## 2017-07-12 NOTE — PROGRESS NOTES
[facility]  [address] [facility] [place] [address] [facility] [address] [address]  Phone: [phone] Fax: [phone]    Progress Note    Name: Alejo Sumner   : 1945   MD: Jose Manuel Das MD       Treatment Diagnosis: Bilateral primary osteoarthritis of knee [M17.0]  Other symptoms and signs involving the musculoskeletal system [R29.898]  Start of Care: 17    Visits from Start of Care: 4  Missed Visits: 0    Summary of Care: The Pt has been seen for 4 outpatient physical therapy sessions with bilateral knee pain (left > right) following a fall from an 8ft elevated surface in 2017. The Pt has been out of therapy for almost 1 month because he was having numbness and tingling in his left arm and wanted to get everything checked out to make sure there were no significant problems he needed to take care of. He has been more busy at work as well, so he has not been diligent with his HEP in the last few weeks and his knee pain has returned. He has progressed well with his therapy program that has focused on improving his hip abduction and extension strength and stability, improving his gluteal activation during dynamic activities, regaining his balance and neuromuscular control, stretching his hip musculature, and improving his activity tolerance and endurance via therapeutic exercises and pain relieving modalities. The Pt was reeducated on his HEP and how to maintain proper form and technique. He is being encouraged to take 30 minutes daily for himself to complete his HEP and focus on himself. Recommend 2 more PT sessions to help progress the above-listed impairments to allow the Pt to fully and safely return to his prior level of function with less overall pain or risk of further injury. Thank you for this referral.    Progress towards goals / Updated goals:  Short Term Goals: To be accomplished in 2 treatments:  1.  The Pt will be independent and compliant with her HEP- met  Long Term Goals: To be accomplished in 5 treatments:  1. The Pt will score the MCII on his FOTO survey demonstrating improved overall function (45 to 51 points)- met (FOTO 72/100)  2. The Pt will be able to ascend and descend 1 standard flight of stairs with 0-2/10 pain to allow the Pt to be able to perform work duties with less pain- progressing  3. The Pt will be able to perform 5 consecutive sit to stands with 0-2/10 pain to allow the Pt to transfer with less discomfort- progressing    G-Codes (GP)  Mobility  [id number] Current  CJ= 20-39%   Goal  CJ= 20-39%    The severity rating is based on the FOTO Score score. Xu Jeffery, PT 7/12/2017 11:31 AM    ________________________________________________________________________  NOTE TO PHYSICIAN:  Please complete the following and fax to: [facility]: Fax: [phone]  . Retain this original for your records. If you are unable to process this request in 24 hours, please contact our office.        ____ I have read the above report and request that my patient continue therapy with the following changes/special instructions:  ____ I have read the above report and request that my patient be discharged from therapy    Physician's Signature:_________________ Date:___________Time:__________

## 2017-07-12 NOTE — PROGRESS NOTES
PT DAILY TREATMENT NOTE - [facility] 2-15    Patient Name: Monse Peraza  Date:2017  : 1945  [x]  Patient  Verified  [place]or[place] MED[place] / Plan: [place] MED[place]ARE PART [place] & B / Product Type: Medicare /    In time: 7:00 AM  Out time: 8:00 AM  Total Treatment Time (min): 60 minutes  Total Timed Codes (min): 60 minutes  1:1 Treatment Time ( only): 45 minutes   Visit #: 4     Treatment Area: Bilateral primary osteoarthritis of knee [M17.0]  Other symptoms and signs involving the musculoskeletal system [R29.898]    SUBJECTIVE  Pain Level (0-10 scale): 10  Any medication changes, allergies to medications, adverse drug reactions, diagnosis change, or new procedure performed?: [x] No    [] Yes (see summary sheet for update)  Subjective functional status/changes:   [] No changes reported  The Pt reports that he has had a lot of different tests and measures because he was having numbness and tingling in the right arm and he was making sure that everything was okay. He was diagnosed with a bone a spur, but surgery is not necessary at this time. He states that he has been busy at work and working a lot over the last month. He states that he has not been diligent with his HEP because he has been so busy and his left knee has been more sore in the last few weeks. He reports that the strain in the lower back has healed and no longer causing him any discomfort. OBJECTIVE  60 min Therapeutic Exercise:  [x] See flow sheet :   Rationale: increase ROM, increase strength, improve coordination, improve balance and increase proprioception to improve the patients ability to perform work duties and ADLs with less pain or discomfort.           With   [x] TE   [] TA   [] neuro   [] other: Patient Education: [x] Review HEP    [] Progressed/Changed HEP based on:   [] positioning   [] body mechanics   [] transfers   [] heat/ice application    [] other:      Other Objective/Functional Measures:  FOTO 72/100 (45/100 at initial evaluation)     Pain Level (0-10 scale) post treatment: 0/10    ASSESSMENT/Changes in Function:     Patient will continue to benefit from skilled PT services to modify and progress therapeutic interventions, address functional mobility deficits, address ROM deficits, address strength deficits, analyze and address soft tissue restrictions, analyze and cue movement patterns, analyze and modify body mechanics/ergonomics, assess and modify postural abnormalities and instruct in home and community integration to attain remaining goals. []  See Plan of Care  [x]  See progress note/recertification  []  See Discharge Summary         Progress towards goals / Updated goals:  Short Term Goals: To be accomplished in 2 treatments:  1. The Pt will be independent and compliant with her HEP- met  Long Term Goals: To be accomplished in 5 treatments:  1. The Pt will score the MCII on his FOTO survey demonstrating improved overall function (45 to 51 points)- met (FOTO 72/100)  2. The Pt will be able to ascend and descend 1 standard flight of stairs with 0-2/10 pain to allow the Pt to be able to perform work duties with less pain- progressing  3.  The Pt will be able to perform 5 consecutive sit to stands with 0-2/10 pain to allow the Pt to transfer with less discomfort- progressing     PLAN  [x]  Upgrade activities as tolerated     [x]  Continue plan of care  [x]  Update interventions per flow sheet       []  Discharge due to:_  []  Other:_      Maryam Saini, PT 7/12/2017  7:21 AM

## 2017-07-12 NOTE — TELEPHONE ENCOUNTER
Spoke to patient 's wife and let her know dr. Yañez Courts would like patient jackson continue his aspirin 325 mg daily unless he has GI problems which he will then put him on 81 mg

## 2017-07-28 ENCOUNTER — HOSPITAL ENCOUNTER (OUTPATIENT)
Dept: PHYSICAL THERAPY | Age: 72
Discharge: HOME | End: 2017-07-28
Payer: MEDICARE

## 2017-07-28 PROCEDURE — 97110 THERAPEUTIC EXERCISES: CPT

## 2017-07-28 NOTE — PROGRESS NOTES
PT DAILY TREATMENT NOTE - [facility] 2-15    Patient Name: Mihceline Stronghurst  Date:2017  : 1945  [x]  Patient  Verified  Payor: [place] MEDICARE / Plan: [place] MEDICARE PART A & B / Product Type: Medicare /    In time: 7:30 AM Out time: 8:20 AM  Total Treatment Time (min): 50 minutes  Total Timed Codes (min): 50 minutes  1:1 Treatment Time ( only): 45 minutes   Visit #: 5     Treatment Area: Bilateral primary osteoarthritis of knee [M17.0]  Other symptoms and signs involving the musculoskeletal system [R29.898]    SUBJECTIVE  Pain Level (0-10 scale): 2/10  Any medication changes, allergies to medications, adverse drug reactions, diagnosis change, or new procedure performed?: [x] No    [] Yes (see summary sheet for update)  Subjective functional status/changes:   [] No changes reported  The Pt reports that his left knee is bothering him more. He states that he fell again on his left knee a week ago as he was carrying a heavy item overhead and up some stairs. He states that his right foot missed the step and he went straight down on the knee and it was bent very deeply. He denied any severe or sharp pain, but reports that it just sore and achy and it is more than usual. He has been incredibly busy at work so he has not done his exercises as frequently as he should. He states that he has been so busy and tired by the time he gets home that it does not happen. His job ends today so he will have much more time to do his exercises. OBJECTIVE    50 min Therapeutic Exercise:  [x] See flow sheet :   Rationale: increase ROM, increase strength, improve coordination, improve balance and increase proprioception to improve the patients ability to perform ADLs and work duties with less pain or discomfort.           With   [x] TE   [] TA   [] neuro   [] other: Patient Education: [x] Review HEP    [] Progressed/Changed HEP based on:   [] positioning   [] body mechanics   [] transfers   [] heat/ice application    [] other: Other Objective/Functional Measures: None noted     Pain Level (0-10 scale) post treatment: 2/10    ASSESSMENT/Changes in Function:   The Pt was able to tolerate his therapy program well without any increased pain or discomfort. He required frequent verbal cuing to maintain proper form and technique, but was able to make the corrections easily. He is going to continue independently for 2-3 weeks with his HEP and then will return for reassessment to determine if skilled PT needs to continue. Patient will continue to benefit from skilled PT services to modify and progress therapeutic interventions, address functional mobility deficits, address ROM deficits, address strength deficits, analyze and address soft tissue restrictions, analyze and cue movement patterns, analyze and modify body mechanics/ergonomics, assess and modify postural abnormalities and instruct in home and community integration to attain remaining goals. []  See Plan of Care  []  See progress note/recertification  []  See Discharge Summary         Progress towards goals / Updated goals:  Short Term Goals: To be accomplished in 2 treatments:  1. The Pt will be independent and compliant with her HEP- met  Long Term Goals: To be accomplished in 5 treatments:  1. The Pt will score the MCII on his FOTO survey demonstrating improved overall function (45 to 51 points)- met (FOTO 72/100)  2. The Pt will be able to ascend and descend 1 standard flight of stairs with 0-2/10 pain to allow the Pt to be able to perform work duties with less pain- progressing  3.  The Pt will be able to perform 5 consecutive sit to stands with 0-2/10 pain to allow the Pt to transfer with less discomfort- progressing    PLAN  [x]  Upgrade activities as tolerated     [x]  Continue plan of care  [x]  Update interventions per flow sheet       []  Discharge due to:_  []  Other:_      Lillian Fowler, PT 7/28/2017  7:38 AM

## 2017-08-18 ENCOUNTER — APPOINTMENT (OUTPATIENT)
Dept: PHYSICAL THERAPY | Age: 72
End: 2017-08-18
Payer: MEDICARE

## 2017-08-21 ENCOUNTER — HOSPITAL ENCOUNTER (OUTPATIENT)
Dept: PHYSICAL THERAPY | Age: 72
Discharge: HOME | End: 2017-08-21
Payer: MEDICARE

## 2017-08-21 PROCEDURE — 97110 THERAPEUTIC EXERCISES: CPT

## 2017-08-21 PROCEDURE — G8980 MOBILITY D/C STATUS: HCPCS

## 2017-08-21 PROCEDURE — G8979 MOBILITY GOAL STATUS: HCPCS

## 2017-08-21 NOTE — PROGRESS NOTES
PT DAILY TREATMENT NOTE - [facility] 2-15    Patient Name: Hesham Trotter  Date:2017  : 1945  [x]  Patient  Verified  Payor: [place] MEDICARE / Plan: [place] MEDICARE PART A & B / Product Type: Medicare /    In time: 7:30 AM  Out time: 8:20 AM  Total Treatment Time (min): 50 minutes  Total Timed Codes (min): 50 minutes  1:1 Treatment Time ( only): 40 minutes   Visit #: 6     Treatment Area: Bilateral primary osteoarthritis of knee [M17.0]  Other symptoms and signs involving the musculoskeletal system [R29.898]    SUBJECTIVE  Pain Level (0-10 scale): 2/10  Any medication changes, allergies to medications, adverse drug reactions, diagnosis change, or new procedure performed?: [x] No    [] Yes (see summary sheet for update)  Subjective functional status/changes:   [] No changes reported  The Pt reports that he has become diligent with his exercises and does then 1-2x daily, but his left knee is continuing to cause him pain and discomfort. He states that the pain is mild during the day, but becomes more moderate at night and aches. He denies any catching or clicking in the joint, and states that it is more an ache. He continues to be very busy and has been doing a lot of ladder work over the last month. He reports that about 1 month ago his left knee bucked 2x in one week while he was going down the stairs. The knee has not given out on him any more, but it worries him. He is planning to return to Dr. Traci Melvin to discuss further intervention for the left knee. OBJECTIVE    50 min Therapeutic Exercise:  [x] See flow sheet :   Rationale: increase ROM, increase strength, improve coordination, improve balance and increase proprioception to improve the patients ability to ambulate and perform ADLs with less pain or discomfort.     With   [x] TE   [] TA   [] neuro   [x] other: Patient Education: [x] Review HEP    [] Progressed/Changed HEP based on:   [] positioning   [] body mechanics   [] transfers   [] heat/ice application    [x] other: Pt educated on how to safely continue and progress his HEP     Other Objective/Functional Measures:  FOTO 72/100 (45/100 at initial evaluation)     Pain Level (0-10 scale) post treatment: 2/10    ASSESSMENT/Changes in Function:      []  See Plan of Care  []  See progress note/recertification  [x]  See Discharge Summary         Progress towards goals / Updated goals:  Short Term Goals: To be accomplished in 2 treatments:  1. The Pt will be independent and compliant with her HEP- met  Long Term Goals: To be accomplished in 5 treatments:  1. The Pt will score the MCII on his FOTO survey demonstrating improved overall function (45 to 51 points)- met (FOTO 72/100)  2. The Pt will be able to ascend and descend 1 standard flight of stairs with 0-2/10 pain to allow the Pt to be able to perform work duties with less pain- met  3.  The Pt will be able to perform 5 consecutive sit to stands with 0-2/10 pain to allow the Pt to transfer with less discomfort- met     PLAN  []  Upgrade activities as tolerated     []  Continue plan of care  []  Update interventions per flow sheet       [x]  Discharge due to: Pt is independent with HEP and met all therapeutic goals  []  Other:_      Kristie Johnston, PT 8/21/2017  7:44 AM

## 2017-09-13 ENCOUNTER — OFFICE VISIT (OUTPATIENT)
Dept: NEUROLOGY | Age: 72
End: 2017-09-13

## 2017-09-13 VITALS
HEART RATE: 72 BPM | WEIGHT: 171.2 LBS | DIASTOLIC BLOOD PRESSURE: 74 MMHG | HEIGHT: 70 IN | OXYGEN SATURATION: 95 % | BODY MASS INDEX: 24.51 KG/M2 | SYSTOLIC BLOOD PRESSURE: 112 MMHG

## 2017-09-13 DIAGNOSIS — M54.12 CERVICAL RADICULOPATHY AT C6: ICD-10-CM

## 2017-09-13 DIAGNOSIS — G56.03 BILATERAL CARPAL TUNNEL SYNDROME: Primary | ICD-10-CM

## 2017-09-13 DIAGNOSIS — I10 ESSENTIAL HYPERTENSION: ICD-10-CM

## 2017-09-13 DIAGNOSIS — E78.2 MIXED HYPERLIPIDEMIA: ICD-10-CM

## 2017-09-13 RX ORDER — ASPIRIN 325 MG
325 TABLET ORAL DAILY
COMMUNITY
End: 2017-09-14 | Stop reason: DRUGHIGH

## 2017-09-13 NOTE — PROGRESS NOTES
Neurology follow-up note  REFERRED BY:  Sam Swain MD    09/13/17    Chief Complaint   Patient presents with    Follow-up     Numbness none        Subjective  Guillermo Rankin is a 67 y.o. male who presented to the neurology office for management of right arm numbness. To recap, he states that the first episode happened on 3 April. He was taking off his shirt when all of a sudden his right side of the neck, right arm and forearm went numb. It lasted for a few minutes and then resolved. He also had some associated right hand weakness. 2 weeks later he had a second episode he was reaching up and this happened. All of the episodes lasted just for a few minutes. There is no seizure-like activity, any numbness of the face or any speech change. He was started on aspirin 325 mg a day. He already takes pravastatin 40 mg daily. He does also have hypertension and takes lisinopril 20 mg a day. The patient did have MRI of the brain and cervical spine today and it did show degenerative changes with moderate to severe foraminal narrowing at C5-C6 level on the right. No new symptoms since the last visit. Current Outpatient Prescriptions   Medication Sig    aspirin (ASPIRIN) 325 mg tablet Take 325 mg by mouth daily.  tadalafil (CIALIS) 5 mg tablet TAKE 1 TABLET daily    pravastatin (PRAVACHOL) 40 mg tablet TAKE 1 TABLET DAILY    lisinopril (PRINIVIL, ZESTRIL) 20 mg tablet TAKE 1 TABLET DAILY     No current facility-administered medications for this visit.       REVIEW OF SYSTEMS:   A ten system review of constitutional, cardiovascular, respiratory, musculoskeletal, endocrine, skin, SHEENT, genitourinary, psychiatric and neurologic systems was obtained and is unremarkable with the exception of recent fall, joint pain     EXAMINATION:   Visit Vitals    /74    Pulse 72    Ht 5' 10\" (1.778 m)    Wt 171 lb 3.2 oz (77.7 kg)    SpO2 95%    BMI 24.56 kg/m2        General:   General appearance: Pt is in no acute distress   Distal pulses are preserved    Neurological Examination:   Mental Status: AAO x3. Speech is fluent. Follows commands, has normal fund of knowledge, attention, short term recall, comprehension and insight. Cranial Nerves: Visual fields are full. PERRL, Extraocular movements are full. Facial sensation intact V1- V3. Facial movement intact, symmetric. Hearing intact to conversation. Palate elevates symmetrically. Shoulder shrug symmetric. Tongue midline. Motor: Strength is 5/5 in all 4 ext. No atrophy. Tone: Normal    Sensation: Normal to light touch     Coordination/Cerebellar: Intact to finger-nose-finger     Skin: No significant bruising or lacerations. Laboratory review:   Results for orders placed or performed in visit on 31/22/72   METABOLIC PANEL, COMPREHENSIVE   Result Value Ref Range    Glucose 91 65 - 99 mg/dL    BUN 15 8 - 27 mg/dL    Creatinine 1.06 0.76 - 1.27 mg/dL    GFR est non-AA 70 >59 mL/min/1.73    GFR est AA 81 >59 mL/min/1.73    BUN/Creatinine ratio 14 10 - 24    Sodium 140 134 - 144 mmol/L    Potassium 4.4 3.5 - 5.2 mmol/L    Chloride 103 96 - 106 mmol/L    CO2 21 18 - 29 mmol/L    Calcium 9.1 8.6 - 10.2 mg/dL    Protein, total 6.6 6.0 - 8.5 g/dL    Albumin 3.9 3.5 - 4.8 g/dL    GLOBULIN, TOTAL 2.7 1.5 - 4.5 g/dL    A-G Ratio 1.4 1.2 - 2.2    Bilirubin, total 0.6 0.0 - 1.2 mg/dL    Alk.  phosphatase 107 39 - 117 IU/L    AST (SGOT) 24 0 - 40 IU/L    ALT (SGPT) 16 0 - 44 IU/L   LIPID PANEL   Result Value Ref Range    Cholesterol, total 189 100 - 199 mg/dL    Triglyceride 73 0 - 149 mg/dL    HDL Cholesterol 47 >39 mg/dL    VLDL, calculated 15 5 - 40 mg/dL    LDL, calculated 127 (H) 0 - 99 mg/dL   CBC WITH AUTOMATED DIFF   Result Value Ref Range    WBC 6.2 3.4 - 10.8 x10E3/uL    RBC 4.66 4.14 - 5.80 x10E6/uL    HGB 14.0 12.6 - 17.7 g/dL    HCT 42.9 37.5 - 51.0 %    MCV 92 79 - 97 fL    MCH 30.0 26.6 - 33.0 pg    MCHC 32.6 31.5 - 35.7 g/dL    RDW 14.2 12.3 - 15.4 %    PLATELET 968 926 - 141 x10E3/uL    NEUTROPHILS 57 %    Lymphocytes 28 %    MONOCYTES 9 %    EOSINOPHILS 5 %    BASOPHILS 1 %    ABS. NEUTROPHILS 3.6 1.4 - 7.0 x10E3/uL    Abs Lymphocytes 1.7 0.7 - 3.1 x10E3/uL    ABS. MONOCYTES 0.5 0.1 - 0.9 x10E3/uL    ABS. EOSINOPHILS 0.3 0.0 - 0.4 x10E3/uL    ABS. BASOPHILS 0.1 0.0 - 0.2 x10E3/uL    IMMATURE GRANULOCYTES 0 %    ABS. IMM. GRANS. 0.0 0.0 - 0.1 x10E3/uL   CVD REPORT   Result Value Ref Range    INTERPRETATION Note      Stroke labs:  HgBA1c    No results found for: HBA1C, [id number], [id number]  LDL   Lab Results   Component Value Date/Time    LDL, calculated 127 04/11/2017 08:23 AM      Imaging review:  7/6/2017  MRI of the brain without contrast  No significant abnormality or acute process  I reviewed the images    MRA of the brain without contrast  Negative MRA of the head    MRI cervical spine without contrast  Multilevel degenerative changes. Borderline central stenosis at C3-C4 and C5-C6. Foraminal narrowing most prominent at C5-C6 on the right. EMG/nerve conduction study  This is an abnormal study. 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 There is no electrophysiological evidence of a cervical radiculopathy on either side. 5/19/2017  Carotid ultrasound  Bilateral less than 50% stenosis of ICA    5/11/2017  X-ray cervical spine  Degenerative spondylosis C4 through C6    Documentation review:  I reviewed the primary care physician's note from 5/11/2017. The patient presents with lack of sensation in the right arm and forearm. He has pain in his right neck when this happens. His arms feel weak. Strength is normal on examination. X-ray of cervical spine shows degenerative spondylosis C4 through C6. Will order carotid Doppler and start the patient on aspirin 81 mg a day. Assessment/Plan:   Irma Mondragon is a 67 y.o. male who presented to the neurology office for management of right arm numbness. He has had 3 episodes lasting for a few minutes. He is presently taking aspirin 325 mg a day. His carotid ultrasound is good. The patient's MRI of the brain and cervical spine did show degenerative changes in the neck along with moderate to severe foraminal narrowing at C5-C6 level. His symptoms in the right upper extremities probably from cervical radiculopathy. 1. 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 Patient is asymptomatic at this time. We will continue to follow. 2. Cervical radiculopathy at C6  MRI did show moderate to severe foraminal narrowing at C5-C6 level on the right. His symptoms of right upper extremity numbness was probably secondary to cervical radiculopathy. No more symptoms at this time. We will continue to observe. 3. Cerebral ischemia   It is hard to completely rule out the possibility of a TIA causing his right upper extremity numbness although it is more likely coming from the neck. The patient is having mild bruising on aspirin 325 mg a day and can decrease the dosage to 81 mg a day. 4. Mixed hyperlipidemia  Goal LDL is less than 70. Continue statins. 5. Essential hypertension  Goal blood pressure is less than 140/100. Blood pressure was elevated today. To be followed up by primary care physician. Follow-up in 3 months      ICD-10-CM ICD-9-CM    1. Bilateral carpal tunnel syndrome G56.03 354.0    2. Cervical radiculopathy at C6 M54.12 723.4    3. Mixed hyperlipidemia E78.2 272.2    4. Essential hypertension I10 401.9       Thank you for allowing me to participate in the care of Mr. Elly Elam. Please feel free to contact me if you have any questions. Savanna Pagan MD  Neurologist    CC: Karyle Reilly, MD  Fax: [phone]    This note was created using voice recognition software. Despite editing, there may be syntax errors.    This note will not be viewable in MyChart.

## 2017-09-13 NOTE — PATIENT INSTRUCTIONS

## 2017-09-13 NOTE — LETTER
9/13/2017 10:03 AM 
 
Patient:    Clark Allen YOB: 1945 Date of Visit:    9/13/2017 Dear Yue Crenshaw MD 
 
Thank you for referring Mr. Jeannine Luque to me for evaluation/treatment. Below are the relevant portions of my assessment and plan of care. Neurology follow-up note REFERRED BY: 
Yue Crenshaw MD 
 
09/13/17 Chief Complaint Patient presents with  Follow-up Numbness none Subjective Clark Allen is a 67 y.o. male who presented to the neurology office for management of right arm numbness. To recap, he states that the first episode happened on 3 April. He was taking off his shirt when all of a sudden his right side of the neck, right arm and forearm went numb. It lasted for a few minutes and then resolved. He also had some associated right hand weakness. 2 weeks later he had a second episode he was reaching up and this happened. All of the episodes lasted just for a few minutes. There is no seizure-like activity, any numbness of the face or any speech change. He was started on aspirin 325 mg a day. He already takes pravastatin 40 mg daily. He does also have hypertension and takes lisinopril 20 mg a day. The patient did have MRI of the brain and cervical spine today and it did show degenerative changes with moderate to severe foraminal narrowing at C5-C6 level on the right. No new symptoms since the last visit. Current Outpatient Prescriptions Medication Sig  
 aspirin (ASPIRIN) 325 mg tablet Take 325 mg by mouth daily.  tadalafil (CIALIS) 5 mg tablet TAKE 1 TABLET daily  pravastatin (PRAVACHOL) 40 mg tablet TAKE 1 TABLET DAILY  lisinopril (PRINIVIL, ZESTRIL) 20 mg tablet TAKE 1 TABLET DAILY No current facility-administered medications for this visit.    
 
REVIEW OF SYSTEMS:  
A ten system review of constitutional, cardiovascular, respiratory, musculoskeletal, endocrine, skin, SHEENT, genitourinary, psychiatric and neurologic systems was obtained and is unremarkable with the exception of recent fall, joint pain EXAMINATION:  
Visit Vitals  /74  Pulse 72  Ht 5' 10\" (1.778 m)  Wt 171 lb 3.2 oz (77.7 kg)  SpO2 95%  BMI 24.56 kg/m2 General:  
General appearance: Pt is in no acute distress Distal pulses are preserved Neurological Examination:  
Mental Status: AAO x3. Speech is fluent. Follows commands, has normal fund of knowledge, attention, short term recall, comprehension and insight. Cranial Nerves: Visual fields are full. PERRL, Extraocular movements are full. Facial sensation intact V1- V3. Facial movement intact, symmetric. Hearing intact to conversation. Palate elevates symmetrically. Shoulder shrug symmetric. Tongue midline. Motor: Strength is 5/5 in all 4 ext. No atrophy. Tone: Normal 
 
Sensation: Normal to light touch Coordination/Cerebellar: Intact to finger-nose-finger Skin: No significant bruising or lacerations. Laboratory review:  
Results for orders placed or performed in visit on 04/11/17 METABOLIC PANEL, COMPREHENSIVE Result Value Ref Range Glucose 91 65 - 99 mg/dL BUN 15 8 - 27 mg/dL Creatinine 1.06 0.76 - 1.27 mg/dL GFR est non-AA 70 >59 mL/min/1.73 GFR est AA 81 >59 mL/min/1.73  
 BUN/Creatinine ratio 14 10 - 24 Sodium 140 134 - 144 mmol/L Potassium 4.4 3.5 - 5.2 mmol/L Chloride 103 96 - 106 mmol/L  
 CO2 21 18 - 29 mmol/L Calcium 9.1 8.6 - 10.2 mg/dL Protein, total 6.6 6.0 - 8.5 g/dL Albumin 3.9 3.5 - 4.8 g/dL GLOBULIN, TOTAL 2.7 1.5 - 4.5 g/dL A-G Ratio 1.4 1.2 - 2.2 Bilirubin, total 0.6 0.0 - 1.2 mg/dL Alk. phosphatase 107 39 - 117 IU/L  
 AST (SGOT) 24 0 - 40 IU/L  
 ALT (SGPT) 16 0 - 44 IU/L  
LIPID PANEL Result Value Ref Range Cholesterol, total 189 100 - 199 mg/dL Triglyceride 73 0 - 149 mg/dL HDL Cholesterol 47 >39 mg/dL VLDL, calculated 15 5 - 40 mg/dL LDL, calculated 127 (H) 0 - 99 mg/dL CBC WITH AUTOMATED DIFF Result Value Ref Range WBC 6.2 3.4 - 10.8 x10E3/uL  
 RBC 4.66 4.14 - 5.80 x10E6/uL HGB 14.0 12.6 - 17.7 g/dL HCT 42.9 37.5 - 51.0 % MCV 92 79 - 97 fL  
 MCH 30.0 26.6 - 33.0 pg  
 MCHC 32.6 31.5 - 35.7 g/dL  
 RDW 14.2 12.3 - 15.4 % PLATELET 004 863 - 987 x10E3/uL NEUTROPHILS 57 % Lymphocytes 28 % MONOCYTES 9 % EOSINOPHILS 5 % BASOPHILS 1 %  
 ABS. NEUTROPHILS 3.6 1.4 - 7.0 x10E3/uL Abs Lymphocytes 1.7 0.7 - 3.1 x10E3/uL  
 ABS. MONOCYTES 0.5 0.1 - 0.9 x10E3/uL  
 ABS. EOSINOPHILS 0.3 0.0 - 0.4 x10E3/uL  
 ABS. BASOPHILS 0.1 0.0 - 0.2 x10E3/uL IMMATURE GRANULOCYTES 0 %  
 ABS. IMM. GRANS. 0.0 0.0 - 0.1 x10E3/uL CVD REPORT Result Value Ref Range INTERPRETATION Note Stroke labs: 
HgBA1c No results found for: HBA1C, [id number], [id number] 
LDL Lab Results Component Value Date/Time LDL, calculated 127 04/11/2017 08:23 AM  
  
Imaging review: 
7/6/2017 MRI of the brain without contrast 
No significant abnormality or acute process I reviewed the images MRA of the brain without contrast 
Negative MRA of the head MRI cervical spine without contrast 
Multilevel degenerative changes. Borderline central stenosis at C3-C4 and C5-C6. Foraminal narrowing most prominent at C5-C6 on the right. EMG/nerve conduction study This is an abnormal study. 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 There is no electrophysiological evidence of a cervical radiculopathy on either side. 5/19/2017 Carotid ultrasound Bilateral less than 50% stenosis of ICA 
 
5/11/2017 X-ray cervical spine Degenerative spondylosis C4 through C6 Documentation review: I reviewed the primary care physician's note from 5/11/2017.   The patient presents with lack of sensation in the right arm and forearm. He has pain in his right neck when this happens. His arms feel weak. Strength is normal on examination. X-ray of cervical spine shows degenerative spondylosis C4 through C6. Will order carotid Doppler and start the patient on aspirin 81 mg a day. Assessment/Plan:  
Ray Miller is a 67 y.o. male who presented to the neurology office for management of right arm numbness. He has had 3 episodes lasting for a few minutes. He is presently taking aspirin 325 mg a day. His carotid ultrasound is good. The patient's MRI of the brain and cervical spine did show degenerative changes in the neck along with moderate to severe foraminal narrowing at C5-C6 level. His symptoms in the right upper extremities probably from cervical radiculopathy. 1. 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 Patient is asymptomatic at this time. We will continue to follow. 2. Cervical radiculopathy at C6 MRI did show moderate to severe foraminal narrowing at C5-C6 level on the right. His symptoms of right upper extremity numbness was probably secondary to cervical radiculopathy. No more symptoms at this time. We will continue to observe. 3. Cerebral ischemia It is hard to completely rule out the possibility of a TIA causing his right upper extremity numbness although it is more likely coming from the neck. The patient is having mild bruising on aspirin 325 mg a day and can decrease the dosage to 81 mg a day. 4. Mixed hyperlipidemia Goal LDL is less than 70. Continue statins. 5. Essential hypertension Goal blood pressure is less than 140/100. Blood pressure was elevated today. To be followed up by primary care physician. Follow-up in 3 months ICD-10-CM ICD-9-CM 1. Bilateral carpal tunnel syndrome G56.03 354.0 2. Cervical radiculopathy at C6 M54.12 723.4 3. Mixed hyperlipidemia E78.2 272.2 4. Essential hypertension I10 401.9 Thank you for allowing me to participate in the care of Mr. Edna Butler. Please feel free to contact me if you have any questions. Gunjan Vargas MD 
Neurologist 
 
CC: Rosana Nunes MD 
Fax: [phone] This note was created using voice recognition software. Despite editing, there may be syntax errors. This note will not be viewable in [address] If you have questions, please do not hesitate to call me. I look forward to following Mr. Edna Butler along with you. Sincerely, Gunjan Vargas MD

## 2017-09-14 RX ORDER — GUAIFENESIN 100 MG/5ML
81 LIQUID (ML) ORAL DAILY
Qty: 30 TAB | Refills: 11
Start: 2017-09-14 | End: 2020-01-02

## 2017-09-24 RX ORDER — PRAVASTATIN SODIUM 40 MG/1
TABLET ORAL
Qty: 90 TAB | Refills: 3 | Status: SHIPPED | OUTPATIENT
Start: 2017-09-24 | End: 2018-12-05 | Stop reason: SDUPTHER

## 2017-10-24 ENCOUNTER — OFFICE VISIT (OUTPATIENT)
Dept: INTERNAL MEDICINE CLINIC | Age: 72
End: 2017-10-24

## 2017-10-24 VITALS
HEART RATE: 63 BPM | DIASTOLIC BLOOD PRESSURE: 77 MMHG | HEIGHT: 70 IN | WEIGHT: 169 LBS | SYSTOLIC BLOOD PRESSURE: 111 MMHG | RESPIRATION RATE: 16 BRPM | TEMPERATURE: 97.2 F | BODY MASS INDEX: 24.2 KG/M2 | OXYGEN SATURATION: 97 %

## 2017-10-24 DIAGNOSIS — I10 HYPERTENSION, ESSENTIAL: Primary | ICD-10-CM

## 2017-10-24 DIAGNOSIS — Z23 ENCOUNTER FOR IMMUNIZATION: ICD-10-CM

## 2017-10-24 DIAGNOSIS — J44.9 COPD, MODERATE (HCC): ICD-10-CM

## 2017-10-24 DIAGNOSIS — M17.12 PRIMARY OSTEOARTHRITIS OF LEFT KNEE: ICD-10-CM

## 2017-10-24 DIAGNOSIS — E78.00 HYPERCHOLESTEROLEMIA: ICD-10-CM

## 2017-10-24 RX ORDER — MELOXICAM 7.5 MG/1
TABLET ORAL
Refills: 11 | COMMUNITY
Start: 2017-10-17 | End: 2017-10-24

## 2017-10-24 NOTE — MR AVS SNAPSHOT
Visit Information Date & Time Provider Department Dept. Phone Encounter #  
 10/24/2017  8:15 AM Manish Cotton MD [address] [place] [address] [phone] [id number] Follow-up Instructions Return in about 6 months (around 4/24/2018) for HTN, chol  Fasting lab one week prior . Upcoming Health Maintenance Date Due DTaP/Tdap/Td series (1 - Tdap) 9/7/2022* MEDICARE YEARLY EXAM 4/19/2018 GLAUCOMA SCREENING Q2Y 8/15/2018 COLONOSCOPY 1/12/2021 *Topic was postponed. The date shown is not the original due date. Allergies as of 10/24/2017  Review Complete On: 10/24/2017 By: Manish Cotton MD  
  
 Severity Noted Reaction Type Reactions Macrobid [Nitrofurantoin Monohyd/m-cryst]  01/22/2010    Unknown (comments) Pravastatin  04/18/2017    Myalgia Tetracycline  01/22/2010    Unknown (comments) Current Immunizations  Reviewed on 10/24/2017 Name Date Influenza High Dose Vaccine PF 10/24/2017, 10/11/2016, 10/13/2015, 9/17/2014 Influenza Vaccine 12/30/2013 Influenza Vaccine Split 10/8/2012 Influenza Vaccine Whole 10/24/2011 Pneumococcal Conjugate (PCV-13) 3/31/2015 TD Vaccine 9/7/2012, 6/1/2007 ZZZ-RETIRED (DO NOT USE) Pneumococcal Vaccine (Unspecified Type) 10/24/2011 Zoster 5/28/2009 Reviewed by Freddy Payton LPN on 80/23/6517 at  7:50 AM  
You Were Diagnosed With   
  
 Codes Comments Hypertension, essential    -  Primary ICD-10-CM: I10 
ICD-9-CM: 401.9 Hypercholesterolemia     ICD-10-CM: E78.00 ICD-9-CM: 272.0 Encounter for immunization     ICD-10-CM: V53 ICD-9-CM: V03.89 Vitals BP Pulse Temp Resp Height(growth percentile) Weight(growth percentile) 111/77 (BP 1 Location: Left arm, BP Patient Position: Sitting) 63 97.2 °F (36.2 °C) (Oral) 16 5' 10\" (1.778 m) 169 lb (76.7 kg) SpO2 BMI Smoking Status 97% 24.25 kg/m2 Former Smoker BMI and BSA Data Body Mass Index Body Surface Area  
 24.25 kg/m 2 1.95 m 2 Preferred Pharmacy Pharmacy Name Phone [address], [facility] [phone] Your Updated Medication List  
  
   
This list is accurate as of: 10/24/17  8:40 AM.  Always use your most recent med list.  
  
  
  
  
 aspirin 81 mg chewable tablet Take 1 Tab by mouth daily. lisinopril 20 mg tablet Commonly known as:  PRINIVIL, ZESTRIL  
TAKE 1 TABLET DAILY pravastatin 40 mg tablet Commonly known as:  PRAVACHOL  
TAKE 1 TABLET DAILY  
  
 tadalafil 5 mg tablet Commonly known as:  CIALIS  
TAKE 1 TABLET daily We Performed the Following ADMIN INFLUENZA VIRUS VAC [ hospitals] INFLUENZA VIRUS VACCINE, HIGH DOSE SEASONAL, PRESERVATIVE FREE [56346 CPT(R)] Follow-up Instructions Return in about 6 months (around 4/24/2018) for HTN, chol  Fasting lab one week prior . To-Do List   
 04/24/2018 Lab:  LIPID PANEL   
  
 04/24/2018 Lab:  METABOLIC PANEL, COMPREHENSIVE Patient Instructions Office visit in 6 months with fasting lab work a week before visit. Vaccine Information Statement Influenza (Flu) Vaccine (Inactivated or Recombinant): What you need to know Many Vaccine Information Statements are available in Polish and other languages. See www.immunize.org/vis Hojas de Información Sobre Vacunas están disponibles en Español y en muchos otros idiomas. Visite www.immunize.org/vis 1. Why get vaccinated? Influenza (flu) is a contagious disease that spreads around the [place] every year, usually between October and May. Flu is caused by influenza viruses, and is spread mainly by coughing, sneezing, and close contact. Anyone can get flu. Flu strikes suddenly and can last several days. Symptoms vary by age, but can include: 
 fever/chills  sore throat  muscle aches  fatigue  cough  headache  runny or stuffy nose Flu can also lead to pneumonia and blood infections, and cause diarrhea and seizures in children. If you have a medical condition, such as heart or lung disease, flu can make it worse. Flu is more dangerous for some people. Infants and young children, people 72years of age and older, pregnant women, and people with certain health conditions or a weakened immune system are at greatest risk. Each year thousands of people in the [facility] die from flu, and many more are hospitalized. Flu vaccine can: 
 keep you from getting flu, 
 make flu less severe if you do get it, and 
 keep you from spreading flu to your family and other people. 2. Inactivated and recombinant flu vaccines A dose of flu vaccine is recommended every flu season. Children 6 months through 6years of age may need two doses during the same flu season. Everyone else needs only one dose each flu season. Some inactivated flu vaccines contain a very small amount of a mercury-based preservative called thimerosal. Studies have not shown thimerosal in vaccines to be harmful, but flu vaccines that do not contain thimerosal are available. There is no live flu virus in flu shots. They cannot cause the flu. There are many flu viruses, and they are always changing. Each year a new flu vaccine is made to protect against three or four viruses that are likely to cause disease in the upcoming flu season. But even when the vaccine doesnt exactly match these viruses, it may still provide some protection Flu vaccine cannot prevent: 
 flu that is caused by a virus not covered by the vaccine, or 
 illnesses that look like flu but are not. It takes about 2 weeks for protection to develop after vaccination, and protection lasts through the flu season. 3. Some people should not get this vaccine Tell the person who is giving you the vaccine:  If you have any severe, life-threatening allergies. If you ever had a life-threatening allergic reaction after a dose of flu vaccine, or have a severe allergy to any part of this vaccine, you may be advised not to get vaccinated. Most, but not all, types of flu vaccine contain a small amount of egg protein.  If you ever had Guillain-Barré Syndrome (also called GBS). Some people with a history of GBS should not get this vaccine. This should be discussed with your doctor.  If you are not feeling well. It is usually okay to get flu vaccine when you have a mild illness, but you might be asked to come back when you feel better. 4. Risks of a vaccine reaction With any medicine, including vaccines, there is a chance of reactions. These are usually mild and go away on their own, but serious reactions are also possible. Most people who get a flu shot do not have any problems with it. Minor problems following a flu shot include:  
 soreness, redness, or swelling where the shot was given  hoarseness  sore, red or itchy eyes  cough  fever  aches  headache  itching  fatigue If these problems occur, they usually begin soon after the shot and last 1 or 2 days. More serious problems following a flu shot can include the following:  There may be a small increased risk of Guillain-Barré Syndrome (GBS) after inactivated flu vaccine. This risk has been estimated at 1 or 2 additional cases per million people vaccinated. This is much lower than the risk of severe complications from flu, which can be prevented by flu vaccine.  Young children who get the flu shot along with pneumococcal vaccine (PCV13) and/or DTaP vaccine at the same time might be slightly more likely to have a seizure caused by fever. Ask your doctor for more information. Tell your doctor if a child who is getting flu vaccine has ever had a seizure. Problems that could happen after any injected vaccine:  People sometimes faint after a medical procedure, including vaccination. Sitting or lying down for about 15 minutes can help prevent fainting, and injuries caused by a fall. Tell your doctor if you feel dizzy, or have vision changes or ringing in the ears.  Some people get severe pain in the shoulder and have difficulty moving the arm where a shot was given. This happens very rarely.  Any medication can cause a severe allergic reaction. Such reactions from a vaccine are very rare, estimated at about 1 in a million doses, and would happen within a few minutes to a few hours after the vaccination. As with any medicine, there is a very remote chance of a vaccine causing a serious injury or death. The safety of vaccines is always being monitored. For more information, visit: www.cdc.gov/vaccinesafety/ 
 
 
6. The National Vaccine Injury Compensation Program 
 
The [facility] Vaccine Injury Compensation Program (VICP) is a federal program that was created to compensate people who may have been injured by certain vaccines. Persons who believe they may have been injured by a vaccine can learn about the program and about filing a claim by calling 0-444.863.9775 or visiting the [employer] website at www.[facility].gov/vaccinecompensation. There is a time limit to file a claim for compensation. 7. How can I learn more?  Ask your healthcare provider. He or she can give you the vaccine package insert or suggest other sources of information.  Call your local or state health department.  Contact the Centers for Disease Control and Prevention (CDC): 
- Call 3-452.628.1805 (1-547-TFO-INFO) or 
- Visit CDCs website at www.cdc.gov/flu Vaccine Information Statement Inactivated Influenza Vaccine 8/7/2015 
42 USamantha Cabrera [facility] [id number] [facility] [employer] Centers for Disease Control and Prevention Office Use Only [facility]! Dear Aly Ocampo: 
Thank you for requesting a [employer] account. Our records indicate that you already have an active [employer] account. You can access your account anytime at [email or web address] Did you know that you can access your hospital and ER discharge instructions at any time in [employer]? You can also review all of your test results from your hospital stay or ER visit. Additional Information If you have questions, please visit the Frequently Asked Questions section of the [employer] website at [email or web address] Remember, [employer] is NOT to be used for urgent needs. For medical emergencies, dial 911. Now available from your iPhone and Android! Please provide this summary of care documentation to your next provider. Your primary care clinician is listed as Yady Ball. If you have any questions after today's visit, please call [phone].

## 2017-10-24 NOTE — PROGRESS NOTES
Briana Delatorre    Chief Complaint   Patient presents with    Blood Pressure Check    Cholesterol Problem    COPD    Immunization/Injection       Reviewed record In preparation for visit and have obtained necessary documentation. Advanced directive / living will on file. 1. Have you been to the ER, urgent care clinic or hospitalized since your last visit? No     2. Have you seen or consulted any other health care providers outside of the [address] [place] [address] since your last visit? Include any pap smears or colon screening. Dr Jay Jay Macias, MRI/MRA, echo, dermatology    Vitals reviewed with provider. Health Maintenance reviewed: After verbal order read back of , patient received High Dose Flu Shot in left arm. [address] [id number]  Lot [id number] 04/30/18. Patient tolerated procedure without complaints and received VIS.

## 2017-10-24 NOTE — PATIENT INSTRUCTIONS
Office visit in 6 months with fasting lab work a week before visit. Vaccine Information Statement    Influenza (Flu) Vaccine (Inactivated or Recombinant): What you need to know    Many Vaccine Information Statements are available in Sami and other languages. See www.immunize.org/vis  Hojas de Información Sobre Vacunas están disponibles en Español y en muchos otros idiomas. Visite www.immunize.org/vis    1. Why get vaccinated? Influenza (flu) is a contagious disease that spreads around the [facility] every year, usually between October and May. Flu is caused by influenza viruses, and is spread mainly by coughing, sneezing, and close contact. Anyone can get flu. Flu strikes suddenly and can last several days. Symptoms vary by age, but can include:   fever/chills   sore throat   muscle aches   fatigue   cough   headache    runny or stuffy nose    Flu can also lead to pneumonia and blood infections, and cause diarrhea and seizures in children. If you have a medical condition, such as heart or lung disease, flu can make it worse. Flu is more dangerous for some people. Infants and young children, people 72years of age and older, pregnant women, and people with certain health conditions or a weakened immune system are at greatest risk. Each year thousands of people in the [facility] die from flu, and many more are hospitalized. Flu vaccine can:   keep you from getting flu,   make flu less severe if you do get it, and   keep you from spreading flu to your family and other people. 2. Inactivated and recombinant flu vaccines    A dose of flu vaccine is recommended every flu season. Children 6 months through 6years of age may need two doses during the same flu season. Everyone else needs only one dose each flu season.        Some inactivated flu vaccines contain a very small amount of a mercury-based preservative called thimerosal. Studies have not shown thimerosal in vaccines to be harmful, but flu vaccines that do not contain thimerosal are available. There is no live flu virus in flu shots. They cannot cause the flu. There are many flu viruses, and they are always changing. Each year a new flu vaccine is made to protect against three or four viruses that are likely to cause disease in the upcoming flu season. But even when the vaccine doesnt exactly match these viruses, it may still provide some protection    Flu vaccine cannot prevent:   flu that is caused by a virus not covered by the vaccine, or   illnesses that look like flu but are not. It takes about 2 weeks for protection to develop after vaccination, and protection lasts through the flu season. 3. Some people should not get this vaccine    Tell the person who is giving you the vaccine:     If you have any severe, life-threatening allergies. If you ever had a life-threatening allergic reaction after a dose of flu vaccine, or have a severe allergy to any part of this vaccine, you may be advised not to get vaccinated. Most, but not all, types of flu vaccine contain a small amount of egg protein.  If you ever had Guillain-Barré Syndrome (also called GBS). Some people with a history of GBS should not get this vaccine. This should be discussed with your doctor.  If you are not feeling well. It is usually okay to get flu vaccine when you have a mild illness, but you might be asked to come back when you feel better. 4. Risks of a vaccine reaction    With any medicine, including vaccines, there is a chance of reactions. These are usually mild and go away on their own, but serious reactions are also possible. Most people who get a flu shot do not have any problems with it.      Minor problems following a flu shot include:    soreness, redness, or swelling where the shot was given     hoarseness   sore, red or itchy eyes   cough   fever   aches   headache   itching   fatigue  If these problems occur, they usually begin soon after the shot and last 1 or 2 days. More serious problems following a flu shot can include the following:     There may be a small increased risk of Guillain-Barré Syndrome (GBS) after inactivated flu vaccine. This risk has been estimated at 1 or 2 additional cases per million people vaccinated. This is much lower than the risk of severe complications from flu, which can be prevented by flu vaccine.  Young children who get the flu shot along with pneumococcal vaccine (PCV13) and/or DTaP vaccine at the same time might be slightly more likely to have a seizure caused by fever. Ask your doctor for more information. Tell your doctor if a child who is getting flu vaccine has ever had a seizure. Problems that could happen after any injected vaccine:      People sometimes faint after a medical procedure, including vaccination. Sitting or lying down for about 15 minutes can help prevent fainting, and injuries caused by a fall. Tell your doctor if you feel dizzy, or have vision changes or ringing in the ears.  Some people get severe pain in the shoulder and have difficulty moving the arm where a shot was given. This happens very rarely.  Any medication can cause a severe allergic reaction. Such reactions from a vaccine are very rare, estimated at about 1 in a million doses, and would happen within a few minutes to a few hours after the vaccination. As with any medicine, there is a very remote chance of a vaccine causing a serious injury or death. The safety of vaccines is always being monitored. For more information, visit: www.cdc.gov/vaccinesafety/    5. What if there is a serious reaction? What should I look for?  Look for anything that concerns you, such as signs of a severe allergic reaction, very high fever, or unusual behavior.     Signs of a severe allergic reaction can include hives, swelling of the face and throat, difficulty breathing, a fast heartbeat, dizziness, and weakness - usually within a few minutes to a few hours after the vaccination. What should I do?  If you think it is a severe allergic reaction or other emergency that cant wait, call 9-1-1 and get the person to the nearest hospital. Otherwise, call your doctor.  Reactions should be reported to the Vaccine Adverse Event Reporting System (VAERS). Your doctor should file this report, or you can do it yourself through  the VAERS web site at www.vaers. [facility].gov, or by calling 0-250.939.2810. VAERS does not give medical advice. 6. The National Vaccine Injury Compensation Program    The [facility] Vaccine Injury Compensation Program (VICP) is a federal program that was created to compensate people who may have been injured by certain vaccines. Persons who believe they may have been injured by a vaccine can learn about the program and about filing a claim by calling 3-736.344.4170 or visiting the [employer] website at www.[facility].gov/vaccinecompensation. There is a time limit to file a claim for compensation. 7. How can I learn more?  Ask your healthcare provider. He or she can give you the vaccine package insert or suggest other sources of information.  Call your local or state health department.  Contact the Centers for Disease Control and Prevention (CDC):  - Call 4-446.864.5858 (1-800-CDC-INFO) or  - Visit CDCs website at www.cdc.gov/flu    Vaccine Information Statement   Inactivated Influenza Vaccine   8/7/2015  42 YOEL Toro [id number]    Department of Health and Human Services  Centers for Disease Control and Prevention    Office Use Only

## 2017-10-24 NOTE — PROGRESS NOTES
HISTORY OF PRESENT ILLNESS  Ellie Lopez is a 67 y.o. male. HPI  He presents for follow up of hypertension and hyperlipidemia. Diet and Lifestyle: generally follows a low fat low cholesterol diet, generally follows a low sodium diet, exercises sporadically, nonsmoker  Medication compliance: compliant all of the time  Medication side effects: none  Home BP Monitoring:  is not measured at home  Cardiovascular ROS:  He complains of dizziness. Two times a month when gets up quickly  He denies palpitations, orthopnea, exertional chest pressure/discomfort, claudication, lower extremity edema, dyspnea on exertion     PFT's in 2014 were compatible with COPD. Michael Kevin He has no dyspnea, wheezing, cough, or orthopnea. He has not required albuterol. Dr Sravan Baugh did cortisone shot in left knee last week which has helped pain.   Patient Active Problem List   Diagnosis Code    Hypercholesterolemia E78.00    Hypertension, essential I10    S/P colonoscopy K60.574    ED (erectile dysfunction) N52.9    Actinic keratosis L57.0    COPD, moderate ([address] J44.9     Past Medical History:   Diagnosis Date    Arthritis     knee    Cancer ([address]     skin    Chronic obstructive pulmonary disease ([address]     Hypercholesterolemia     Hypertension     Ill-defined condition     lipids    Smoking 1/22/2010    Stopped 04/2000      Past Surgical History:   Procedure Laterality Date    COLORECTAL SCRN; HI RISK IND  1/12/2016         ENDOSCOPY, COLON, DIAGNOSTIC  07/16/2007    Dr Cody Yee polyp repeat 07/2010    HX ORTHOPAEDIC Right 3/2016    arthroscopic; torn medial meniscus     Social History     Social History    Marital status:      Spouse name: N/A    Number of children: 3    Years of education: N/A     Occupational History    construction      Social History Main Topics    Smoking status: Former Smoker     Packs/day: 2.00     Years: 40.00     Quit date: 4/1/2000    Smokeless tobacco: Never Used    Alcohol use 0.0 oz/week 2 - 3 Cans of beer per week    Drug use: No    Sexual activity: Not Asked     Other Topics Concern    None     Social History Narrative     Family History   Problem Relation Age of Onset    Hypertension Mother     Thyroid Disease Mother     Kidney Disease Father     Cancer Father      skin    Diabetes Father     Cancer Paternal Aunt      lung    Cancer Paternal Uncle      skin     Allergies   Allergen Reactions    Macrobid [Nitrofurantoin Monohyd/M-Cryst] Unknown (comments)    Pravastatin Myalgia    Tetracycline Unknown (comments)     Current Outpatient Prescriptions   Medication Sig Dispense Refill    pravastatin (PRAVACHOL) 40 mg tablet TAKE 1 TABLET DAILY 90 Tab 3    aspirin 81 mg chewable tablet Take 1 Tab by mouth daily. 30 Tab 11    tadalafil (CIALIS) 5 mg tablet TAKE 1 TABLET daily 30 Tab 5    lisinopril (PRINIVIL, ZESTRIL) 20 mg tablet TAKE 1 TABLET DAILY 90 Tab 3       Review of Systems   Constitutional: Positive for malaise/fatigue (only sleeps about 6.5 hours at night. Takes an afternoon nap). Negative for weight loss. Gastrointestinal: Positive for heartburn (when eats onions). Negative for constipation and diarrhea. Musculoskeletal: Positive for joint pain. Negative for back pain. Occasional left sciatic pain   Neurological: Negative for dizziness, tingling and focal weakness. Visit Vitals    /77 (BP 1 Location: Left arm, BP Patient Position: Sitting)    Pulse 63    Temp 97.2 °F (36.2 °C) (Oral)    Resp 16    Ht 5' 10\" (1.778 m)    Wt 169 lb (76.7 kg)    SpO2 97%    BMI 24.25 kg/m2     Physical Exam   Constitutional: He is oriented to person, place, and time. He appears well-developed and well-nourished. HENT:   Head: Normocephalic and atraumatic. Eyes: Conjunctivae are normal. Pupils are equal, round, and reactive to light. Neck: Neck supple. Carotid bruit is not present. No thyromegaly present.    Cardiovascular: Normal rate, regular rhythm and normal heart sounds. PMI is not displaced. Exam reveals no gallop. No murmur heard. Pulses:       Dorsalis pedis pulses are 2+ on the right side, and 2+ on the left side. Posterior tibial pulses are 2+ on the right side, and 2+ on the left side. Pulmonary/Chest: Effort normal. He has no wheezes. He has no rhonchi. He has no rales. Abdominal: Soft. Normal appearance. He exhibits no abdominal bruit and no mass. There is no hepatosplenomegaly. There is no tenderness. Musculoskeletal: He exhibits no edema. Lymphadenopathy:     He has no cervical adenopathy. Right: No supraclavicular adenopathy present. Left: No supraclavicular adenopathy present. Neurological: He is alert and oriented to person, place, and time. No sensory deficit. Skin: Skin is warm, dry and intact. No rash noted. Psychiatric: He has a normal mood and affect. His behavior is normal.   Nursing note and vitals reviewed. Results for orders placed or performed in visit on 48/59/54   METABOLIC PANEL, COMPREHENSIVE   Result Value Ref Range    Glucose 91 65 - 99 mg/dL    BUN 15 8 - 27 mg/dL    Creatinine 1.06 0.76 - 1.27 mg/dL    GFR est non-AA 70 >59 mL/min/1.73    GFR est AA 81 >59 mL/min/1.73    BUN/Creatinine ratio 14 10 - 24    Sodium 140 134 - 144 mmol/L    Potassium 4.4 3.5 - 5.2 mmol/L    Chloride 103 96 - 106 mmol/L    CO2 21 18 - 29 mmol/L    Calcium 9.1 8.6 - 10.2 mg/dL    Protein, total 6.6 6.0 - 8.5 g/dL    Albumin 3.9 3.5 - 4.8 g/dL    GLOBULIN, TOTAL 2.7 1.5 - 4.5 g/dL    A-G Ratio 1.4 1.2 - 2.2    Bilirubin, total 0.6 0.0 - 1.2 mg/dL    Alk.  phosphatase 107 39 - 117 IU/L    AST (SGOT) 24 0 - 40 IU/L    ALT (SGPT) 16 0 - 44 IU/L   LIPID PANEL   Result Value Ref Range    Cholesterol, total 189 100 - 199 mg/dL    Triglyceride 73 0 - 149 mg/dL    HDL Cholesterol 47 >39 mg/dL    VLDL, calculated 15 5 - 40 mg/dL    LDL, calculated 127 (H) 0 - 99 mg/dL   CBC WITH AUTOMATED DIFF   Result Value Ref Range WBC 6.2 3.4 - 10.8 x10E3/uL    RBC 4.66 4.14 - 5.80 x10E6/uL    HGB 14.0 12.6 - 17.7 g/dL    HCT 42.9 37.5 - 51.0 %    MCV 92 79 - 97 fL    MCH 30.0 26.6 - 33.0 pg    MCHC 32.6 31.5 - 35.7 g/dL    RDW 14.2 12.3 - 15.4 %    PLATELET 985 270 - 932 x10E3/uL    NEUTROPHILS 57 %    Lymphocytes 28 %    MONOCYTES 9 %    EOSINOPHILS 5 %    BASOPHILS 1 %    ABS. NEUTROPHILS 3.6 1.4 - 7.0 x10E3/uL    Abs Lymphocytes 1.7 0.7 - 3.1 x10E3/uL    ABS. MONOCYTES 0.5 0.1 - 0.9 x10E3/uL    ABS. EOSINOPHILS 0.3 0.0 - 0.4 x10E3/uL    ABS. BASOPHILS 0.1 0.0 - 0.2 x10E3/uL    IMMATURE GRANULOCYTES 0 %    ABS. IMM. GRANS. 0.0 0.0 - 0.1 x10E3/uL   CVD REPORT   Result Value Ref Range    INTERPRETATION Note        ASSESSMENT and PLAN    ICD-10-CM ICD-9-CM    1. Hypertension, essential I10 401.9    2. Hypercholesterolemia S44.17 534.7 METABOLIC PANEL, COMPREHENSIVE      LIPID PANEL   3. COPD, moderate (HCC) J44.9 496    4. Primary osteoarthritis of left knee M17.12 715.16    5. Encounter for immunization Z23 V03.89 INFLUENZA VIRUS VACCINE, HIGH DOSE SEASONAL, PRESERVATIVE FREE      ADMIN INFLUENZA VIRUS VAC     Diagnoses and all orders for this visit:    1. Hypertension, essential  Hypertension is controlled    2. Hypercholesterolemia  Hyperlipidemia is borderline controlled  -     METABOLIC PANEL, COMPREHENSIVE; Future  -     LIPID PANEL; Future    3. COPD, moderate ([address]  Asymptomatic. 4. Primary osteoarthritis of left knee    5. Encounter for immunization  -     Influenza virus vaccine ([address]) 72 years and older ([address])  -     Administration fee () for Medicare insured patients      Follow-up Disposition:  Return in about 6 months (around 4/24/2018) for HTN, chol  Fasting lab one week prior .   lab results and schedule of future lab studies reviewed with patient  reviewed diet, exercise and weight control  cardiovascular risk and specific lipid/LDL goals reviewed  I have discussed the diagnosis with the patient and the intended plan as seen in the above orders. Patient is in agreement. The patient has received an after-visit summary and questions were answered concerning future plans. I have discussed medication side effects and warnings with the patient as well.

## 2017-12-18 ENCOUNTER — OFFICE VISIT (OUTPATIENT)
Dept: INTERNAL MEDICINE CLINIC | Age: 72
End: 2017-12-18

## 2017-12-18 VITALS
HEIGHT: 70 IN | BODY MASS INDEX: 24.05 KG/M2 | HEART RATE: 71 BPM | WEIGHT: 168 LBS | RESPIRATION RATE: 16 BRPM | SYSTOLIC BLOOD PRESSURE: 123 MMHG | DIASTOLIC BLOOD PRESSURE: 90 MMHG | TEMPERATURE: 97.8 F | OXYGEN SATURATION: 97 %

## 2017-12-18 DIAGNOSIS — R10.32 LEFT LOWER QUADRANT PAIN: ICD-10-CM

## 2017-12-18 DIAGNOSIS — K57.32 DIVERTICULITIS OF LARGE INTESTINE WITHOUT PERFORATION OR ABSCESS WITHOUT BLEEDING: Primary | ICD-10-CM

## 2017-12-18 DIAGNOSIS — Z13.31 SCREENING FOR DEPRESSION: ICD-10-CM

## 2017-12-18 RX ORDER — METRONIDAZOLE 500 MG/1
500 TABLET ORAL 3 TIMES DAILY
Qty: 30 TAB | Refills: 0 | Status: SHIPPED | OUTPATIENT
Start: 2017-12-18 | End: 2018-06-15 | Stop reason: ALTCHOICE

## 2017-12-18 RX ORDER — CIPROFLOXACIN 500 MG/1
500 TABLET ORAL 2 TIMES DAILY
Qty: 20 TAB | Refills: 0 | Status: SHIPPED | OUTPATIENT
Start: 2017-12-18 | End: 2018-06-15 | Stop reason: ALTCHOICE

## 2017-12-18 NOTE — MR AVS SNAPSHOT
Visit Information Date & Time Provider Department Dept. Phone Encounter #  
 12/18/2017  4:00 PM MD Carmela Varma [phone] [id number] Follow-up Instructions Return if symptoms worsen or fail to improve. Your Appointments 4/17/2018  8:30 AM  
LAB with LAB [facility] Carmela Sorenson [facility]) Appt Note: Fasting Labs [facility]) [address] [address] [phone]  
  
   
 [facility]  
  
    
 4/24/2018  8:15 AM  
ROUTINE CARE with MD Carmela Varma [facility]) Appt Note: 6 months (around 4/24/2018) for HTN, chol  Fasting lab one week prior [address] [address] [phone]  
  
   
 [address] [facility] [address] Upcoming Health Maintenance Date Due DTaP/Tdap/Td series (1 - Tdap) 9/7/2022* MEDICARE YEARLY EXAM 4/19/2018 GLAUCOMA SCREENING Q2Y 8/15/2018 COLONOSCOPY 1/12/2021 *Topic was postponed. The date shown is not the original due date. Allergies as of 12/18/2017  Review Complete On: 12/18/2017 By: Cheyanne Ramon MD  
  
 Severity Noted Reaction Type Reactions Macrobid [Nitrofurantoin Monohyd/m-cryst]  01/22/2010    Unknown (comments) Pravastatin  04/18/2017    Myalgia Tetracycline  01/22/2010    Unknown (comments) Current Immunizations  Reviewed on 12/18/2017 Name Date Influenza High Dose Vaccine PF 10/24/2017, 10/11/2016, 10/13/2015, 9/17/2014 Influenza Vaccine 12/30/2013 Influenza Vaccine Split 10/8/2012 Influenza Vaccine Whole 10/24/2011 Pneumococcal Conjugate (PCV-13) 3/31/2015 TD Vaccine 9/7/2012, 6/1/2007 ZZZ-RETIRED (DO NOT USE) Pneumococcal Vaccine (Unspecified Type) 10/24/2011 Zoster 5/28/2009  Reviewed by Kristina Whitman LPN on 82/74/7794 at  4:17 PM  
 You Were Diagnosed With   
  
 Codes Comments Diverticulitis of large intestine without perforation or abscess without bleeding    -  Primary ICD-10-CM: K57.32 
ICD-9-CM: 562.11 Vitals BP Pulse Temp Resp Height(growth percentile) Weight(growth percentile) 123/90 (BP 1 Location: Left arm, BP Patient Position: Sitting) 71 97.8 °F (36.6 °C) (Oral) 16 5' 10\" (1.778 m) 168 lb (76.2 kg) SpO2 BMI Smoking Status 97% 24.11 kg/m2 Former Smoker Vitals History BMI and BSA Data Body Mass Index Body Surface Area  
 24.11 kg/m 2 1.94 m 2 Preferred Pharmacy Pharmacy Name Phone [facility] #[id number] - [address] [phone] Your Updated Medication List  
  
   
This list is accurate as of: 12/18/17  5:11 PM.  Always use your most recent med list.  
  
  
  
  
 aspirin 81 mg chewable tablet Take 1 Tab by mouth daily. ciprofloxacin HCl 500 mg tablet Commonly known as:  CIPRO Take 1 Tab by mouth two (2) times a day. lisinopril 20 mg tablet Commonly known as:  PRINIVIL, ZESTRIL  
TAKE 1 TABLET DAILY  
  
 metroNIDAZOLE 500 mg tablet Commonly known as:  FLAGYL Take 1 Tab by mouth three (3) times daily. pravastatin 40 mg tablet Commonly known as:  PRAVACHOL  
TAKE 1 TABLET DAILY  
  
 tadalafil 5 mg tablet Commonly known as:  CIALIS  
TAKE 1 TABLET daily Prescriptions Sent to Pharmacy Refills  
 ciprofloxacin HCl (CIPRO) 500 mg tablet 0 Sig: Take 1 Tab by mouth two (2) times a day. Class: Normal  
 Pharmacy: [facility] #: [phone] Route: Oral  
 metroNIDAZOLE (FLAGYL) 500 mg tablet 0 Sig: Take 1 Tab by mouth three (3) times daily.   
 Class: Normal  
 Pharmacy: [facility]/pharmacy #[id number] - [address] [facility] [address]  #: [phone] Route: Oral  
  
Follow-up Instructions Return if symptoms worsen or fail to improve. Patient Instructions If not improving in 3 days, call us, or send us a My Chart message. We will need to order a CT scan. If worse go to the ED. Diverticulitis: Care Instructions Your Care Instructions Diverticulitis occurs when pouches form in the wall of the colon and become inflamed or infected. It can be very painful. Doctors aren't sure what causes diverticulitis. There is no proof that foods such as nuts, seeds, or berries cause it or make it worse. A low-fiber diet may cause the colon to work harder to push stool forward. Pouches may form because of this extra work. It may be hard to think about healthy eating while you're in pain. But as you recover, you might think about how you can use healthy eating for overall better health. Healthy eating may help you avoid future attacks. Follow-up care is a key part of your treatment and safety. Be sure to make and go to all appointments, and call your doctor if you are having problems. It's also a good idea to know your test results and keep a list of the medicines you take. How can you care for yourself at home? · Drink plenty of fluids, enough so that your urine is light yellow or clear like water. If you have kidney, heart, or liver disease and have to limit fluids, talk with your doctor before you increase the amount of fluids you drink. · Stick to liquids or a bland diet (plain rice, bananas, dry toast or crackers, applesauce) until you are feeling better. Then you can return to regular foods and gradually increase the amount of fiber in your diet. · Use a heating pad set on low on your belly to relieve mild cramps and pain. · Get extra rest until you are feeling better. · Be safe with medicines. Read and follow all instructions on the label. ¨ If the doctor gave you a prescription medicine for pain, take it as prescribed. ¨ If you are not taking a prescription pain medicine, ask your doctor if you can take an over-the-counter medicine. · If your doctor prescribed antibiotics, take them as directed. Do not stop taking them just because you feel better. You need to take the full course of antibiotics. To prevent future attacks of diverticulitis · Avoid constipation: 
¨ Include fruits, vegetables, beans, and whole grains in your diet each day. These foods are high in fiber. ¨ Drink plenty of fluids, enough so that your urine is light yellow or clear like water. If you have kidney, heart, or liver disease and have to limit fluids, talk with your doctor before you increase the amount of fluids you drink. ¨ Get some exercise every day. Build up slowly to 30 to 60 minutes a day on 5 or more days of the week. ¨ Take a fiber supplement, such as Citrucel or Metamucil, every day if needed. Read and follow all instructions on the label. ¨ Schedule time each day for a bowel movement. Having a daily routine may help. Take your time and do not strain when having a bowel movement. When should you call for help? Call your doctor now or seek immediate medical care if: 
? · You have a fever. ? · You are vomiting. ? · You have new or worse belly pain. ? · You cannot pass stools or gas. ? Watch closely for changes in your health, and be sure to contact your doctor if you have any problems. Where can you learn more? Go to http://ronald-biju.info/. Enter H901 in the search box to learn more about \"Diverticulitis: Care Instructions. \" Current as of: May 12, 2017 Content Version: 11.4 © 4995-5148 [employer]. Care instructions adapted under license by [employer] (which disclaims liability or warranty for this information).  If you have questions about a medical condition or this instruction, always ask your healthcare professional. [address] any warranty or liability for your use of this information. Introducing [facility]! Dear Izzy Parker: 
Thank you for requesting a Wirescan account. Our records indicate that you already have an active Wirescan account. You can access your account anytime at [email or web address] Did you know that you can access your hospital and ER discharge instructions at any time in Wirescan? You can also review all of your test results from your hospital stay or ER visit. Additional Information If you have questions, please visit the Frequently Asked Questions section of the Wirescan website at [email or web address] Remember, Wirescan is NOT to be used for urgent needs. For medical emergencies, dial 911. Now available from your iPhone and Android! Please provide this summary of care documentation to your next provider. Your primary care clinician is listed as Senthil Adkins. If you have any questions after today's visit, please call [phone].

## 2017-12-18 NOTE — PROGRESS NOTES
Betsy Glass    Chief Complaint   Patient presents with    Abdominal Pain     since Friday       Reviewed record In preparation for visit and have obtained necessary documentation. Advanced directive / living will on file. 1. Have you been to the ER, urgent care clinic or hospitalized since your last visit? PT 1st end Nov    2. Have you seen or consulted any other health care providers outside of the [address] [place] [address] since your last visit? Include any pap smears or colon screening. No    Vitals reviewed with provider.     Health Maintenance reviewed:

## 2017-12-18 NOTE — PROGRESS NOTES
HISTORY OF PRESENT ILLNESS  Allie Yan is a 67 y.o. male. HPI  He complains of 4 days of epigastric, periumbilical abdominal pain with radiation to back  The pain is described as burning and feels like stomach is in a knot. Constant, 4/10 at worst and 2/10 at least,. It wakes him up at night. Is worst if lies flat and better if upright. Finished doxycycline a week ago for respiratory infection. Symptoms have been unchanged since onset. Aggravating factors: sitting forward, and lying down/ It wakes him up. Alleviating factors: nothing. Associated symptoms: anorexia and constipation. The patient denies frequency, nausea and vomiting. Hx of diverticulitis and remembers pain as being similar.      PHQ over the last two weeks 4/18/2017   Little interest or pleasure in doing things Not at all   Feeling down, depressed or hopeless Not at all   Total Score PHQ 2 0       Patient Active Problem List   Diagnosis Code    Hypercholesterolemia E78.00    Hypertension, essential I10    S/P colonoscopy K36.737    ED (erectile dysfunction) N52.9    Actinic keratosis L57.0    COPD, moderate ([address] J44.9     Past Medical History:   Diagnosis Date    Arthritis     knee    Cancer ([address]     skin    Chronic obstructive pulmonary disease ([address]     Hypercholesterolemia     Hypertension     Ill-defined condition     lipids    Smoking 1/22/2010    Stopped 04/2000      Past Surgical History:   Procedure Laterality Date    COLORECTAL SCRN; HI RISK IND  1/12/2016         ENDOSCOPY, COLON, DIAGNOSTIC  07/16/2007    Dr Carmen Qureshi polyp repeat 07/2010    HX ORTHOPAEDIC Right 3/2016    arthroscopic; torn medial meniscus     Social History     Social History    Marital status:      Spouse name: N/A    Number of children: 3    Years of education: N/A     Occupational History    construction      Social History Main Topics    Smoking status: Former Smoker     Packs/day: 2.00     Years: 40.00     Quit date: 4/1/2000    Skin: Skin is warm, dry and intact. Psychiatric: He has a normal mood and affect. His behavior is normal.   Nursing note and vitals reviewed. ASSESSMENT and PLAN    ICD-10-CM ICD-9-CM    1. Diverticulitis of large intestine without perforation or abscess without bleeding K57.32 562.11 ciprofloxacin HCl (CIPRO) 500 mg tablet      metroNIDAZOLE (FLAGYL) 500 mg tablet   2. Left lower quadrant pain R10.32 789.04    3. Screening for depression Z13.89 V79.0 ID DEPRESSION SCREEN ANNUAL     Diagnoses and all orders for this visit:    1. Diverticulitis of large intestine without perforation or abscess without bleeding  -     ciprofloxacin HCl (CIPRO) 500 mg tablet; Take 1 Tab by mouth two (2) times a day. -     metroNIDAZOLE (FLAGYL) 500 mg tablet; Take 1 Tab by mouth three (3) times daily. 2. Left lower quadrant pain    3. Screening for depression  -     ID DEPRESSION SCREEN ANNUAL      Follow-up Disposition:  Return if symptoms worsen or fail to improve. reviewed diet, exercise and weight control  I have discussed the diagnosis with the patient and the intended plan as seen in the above orders. Patient is in agreement. The patient has received an after-visit summary and questions were answered concerning future plans. I have discussed medication side effects and warnings with the patient as well.

## 2017-12-18 NOTE — PATIENT INSTRUCTIONS
If not improving in 3 days, call us, or send us a My Chart message. We will need to order a CT scan. If worse go to the ED. Diverticulitis: Care Instructions  Your Care Instructions    Diverticulitis occurs when pouches form in the wall of the colon and become inflamed or infected. It can be very painful. Doctors aren't sure what causes diverticulitis. There is no proof that foods such as nuts, seeds, or berries cause it or make it worse. A low-fiber diet may cause the colon to work harder to push stool forward. Pouches may form because of this extra work. It may be hard to think about healthy eating while you're in pain. But as you recover, you might think about how you can use healthy eating for overall better health. Healthy eating may help you avoid future attacks. Follow-up care is a key part of your treatment and safety. Be sure to make and go to all appointments, and call your doctor if you are having problems. It's also a good idea to know your test results and keep a list of the medicines you take. How can you care for yourself at home? · Drink plenty of fluids, enough so that your urine is light yellow or clear like water. If you have kidney, heart, or liver disease and have to limit fluids, talk with your doctor before you increase the amount of fluids you drink. · Stick to liquids or a bland diet (plain rice, bananas, dry toast or crackers, applesauce) until you are feeling better. Then you can return to regular foods and gradually increase the amount of fiber in your diet. · Use a heating pad set on low on your belly to relieve mild cramps and pain. · Get extra rest until you are feeling better. · Be safe with medicines. Read and follow all instructions on the label. ¨ If the doctor gave you a prescription medicine for pain, take it as prescribed. ¨ If you are not taking a prescription pain medicine, ask your doctor if you can take an over-the-counter medicine.   · If your doctor prescribed antibiotics, take them as directed. Do not stop taking them just because you feel better. You need to take the full course of antibiotics. To prevent future attacks of diverticulitis  · Avoid constipation:  ¨ Include fruits, vegetables, beans, and whole grains in your diet each day. These foods are high in fiber. ¨ Drink plenty of fluids, enough so that your urine is light yellow or clear like water. If you have kidney, heart, or liver disease and have to limit fluids, talk with your doctor before you increase the amount of fluids you drink. ¨ Get some exercise every day. Build up slowly to 30 to 60 minutes a day on 5 or more days of the week. ¨ Take a fiber supplement, such as Citrucel or Metamucil, every day if needed. Read and follow all instructions on the label. ¨ Schedule time each day for a bowel movement. Having a daily routine may help. Take your time and do not strain when having a bowel movement. When should you call for help? Call your doctor now or seek immediate medical care if:  ? · You have a fever. ? · You are vomiting. ? · You have new or worse belly pain. ? · You cannot pass stools or gas. ? Watch closely for changes in your health, and be sure to contact your doctor if you have any problems. Where can you learn more? Go to http://ronald-biju.info/. Enter H901 in the search box to learn more about \"Diverticulitis: Care Instructions. \"  Current as of: May 12, 2017  Content Version: 11.4  © 3207-0954 [employer]. Care instructions adapted under license by [employer] (which disclaims liability or warranty for this information). If you have questions about a medical condition or this instruction, always ask your healthcare professional. [address] any warranty or liability for your use of this information.

## 2017-12-20 ENCOUNTER — TELEPHONE (OUTPATIENT)
Dept: INTERNAL MEDICINE CLINIC | Age: 72
End: 2017-12-20

## 2017-12-20 DIAGNOSIS — K59.00 CONSTIPATION, UNSPECIFIED CONSTIPATION TYPE: ICD-10-CM

## 2017-12-20 DIAGNOSIS — R10.33 PERIUMBILICAL ABDOMINAL PAIN: Primary | ICD-10-CM

## 2017-12-21 ENCOUNTER — HOSPITAL ENCOUNTER (EMERGENCY)
Age: 72
Discharge: HOME | End: 2017-12-21
Attending: EMERGENCY MEDICINE
Payer: MEDICARE

## 2017-12-21 ENCOUNTER — APPOINTMENT (OUTPATIENT)
Dept: CT IMAGING | Age: 72
End: 2017-12-21
Attending: EMERGENCY MEDICINE
Payer: MEDICARE

## 2017-12-21 VITALS
RESPIRATION RATE: 14 BRPM | BODY MASS INDEX: 23.99 KG/M2 | DIASTOLIC BLOOD PRESSURE: 85 MMHG | WEIGHT: 167.55 LBS | HEIGHT: 70 IN | SYSTOLIC BLOOD PRESSURE: 131 MMHG | TEMPERATURE: 98.1 F | OXYGEN SATURATION: 98 % | HEART RATE: 87 BPM

## 2017-12-21 DIAGNOSIS — K57.32 DIVERTICULITIS LARGE INTESTINE W/O PERFORATION OR ABSCESS W/O BLEEDING: Primary | ICD-10-CM

## 2017-12-21 DIAGNOSIS — K57.12 DIVERTICULITIS OF SMALL INTESTINE WITHOUT PERFORATION OR ABSCESS WITHOUT BLEEDING: ICD-10-CM

## 2017-12-21 LAB
ALBUMIN SERPL-MCNC: 3 G/DL (ref 3.5–5)
ALBUMIN/GLOB SERPL: 0.7 {RATIO} (ref 1.1–2.2)
ALP SERPL-CCNC: 126 U/L (ref 45–117)
ALT SERPL-CCNC: 18 U/L (ref 12–78)
ANION GAP SERPL CALC-SCNC: 7 MMOL/L (ref 5–15)
APPEARANCE UR: CLEAR
AST SERPL-CCNC: 11 U/L (ref 15–37)
BACTERIA URNS QL MICRO: NEGATIVE /HPF
BASOPHILS # BLD: 0 K/UL (ref 0–0.1)
BASOPHILS NFR BLD: 0 % (ref 0–1)
BILIRUB SERPL-MCNC: 0.4 MG/DL (ref 0.2–1)
BILIRUB UR QL: NEGATIVE
BUN SERPL-MCNC: 13 MG/DL (ref 6–20)
BUN/CREAT SERPL: 12 (ref 12–20)
CALCIUM SERPL-MCNC: 8.8 MG/DL (ref 8.5–10.1)
CHLORIDE SERPL-SCNC: 106 MMOL/L (ref 97–108)
CO2 SERPL-SCNC: 27 MMOL/L (ref 21–32)
COLOR UR: NORMAL
CREAT SERPL-MCNC: 1.08 MG/DL (ref 0.7–1.3)
EOSINOPHIL # BLD: 0.4 K/UL (ref 0–0.4)
EOSINOPHIL NFR BLD: 4 % (ref 0–7)
EPITH CASTS URNS QL MICRO: NORMAL /LPF
ERYTHROCYTE [DISTWIDTH] IN BLOOD BY AUTOMATED COUNT: 13.8 % (ref 11.5–14.5)
GLOBULIN SER CALC-MCNC: 4.3 G/DL (ref 2–4)
GLUCOSE SERPL-MCNC: 104 MG/DL (ref 65–100)
GLUCOSE UR STRIP.AUTO-MCNC: NEGATIVE MG/DL
HCT VFR BLD AUTO: 41.2 % (ref 36.6–50.3)
HGB BLD-MCNC: 13.6 G/DL (ref 12.1–17)
HGB UR QL STRIP: NEGATIVE
HYALINE CASTS URNS QL MICRO: NORMAL /LPF (ref 0–5)
KETONES UR QL STRIP.AUTO: NEGATIVE MG/DL
LACTATE SERPL-SCNC: 0.8 MMOL/L (ref 0.4–2)
LEUKOCYTE ESTERASE UR QL STRIP.AUTO: NEGATIVE
LIPASE SERPL-CCNC: 96 U/L (ref 73–393)
LYMPHOCYTES # BLD: 1.4 K/UL (ref 0.8–3.5)
LYMPHOCYTES NFR BLD: 15 % (ref 12–49)
MCH RBC QN AUTO: 29.6 PG (ref 26–34)
MCHC RBC AUTO-ENTMCNC: 33 G/DL (ref 30–36.5)
MCV RBC AUTO: 89.6 FL (ref 80–99)
MONOCYTES # BLD: 0.9 K/UL (ref 0–1)
MONOCYTES NFR BLD: 10 % (ref 5–13)
NEUTS SEG # BLD: 6.6 K/UL (ref 1.8–8)
NEUTS SEG NFR BLD: 71 % (ref 32–75)
NITRITE UR QL STRIP.AUTO: NEGATIVE
PH UR STRIP: 6 [PH] (ref 5–8)
PLATELET # BLD AUTO: 285 K/UL (ref 150–400)
POTASSIUM SERPL-SCNC: 3.9 MMOL/L (ref 3.5–5.1)
PROT SERPL-MCNC: 7.3 G/DL (ref 6.4–8.2)
PROT UR STRIP-MCNC: NEGATIVE MG/DL
RBC # BLD AUTO: 4.6 M/UL (ref 4.1–5.7)
RBC #/AREA URNS HPF: NORMAL /HPF (ref 0–5)
SODIUM SERPL-SCNC: 140 MMOL/L (ref 136–145)
SP GR UR REFRACTOMETRY: <1.005 (ref 1–1.03)
UA: UC IF INDICATED,UAUC: NORMAL
UROBILINOGEN UR QL STRIP.AUTO: 0.2 EU/DL (ref 0.2–1)
WBC # BLD AUTO: 9.3 K/UL (ref 4.1–11.1)
WBC URNS QL MICRO: NORMAL /HPF (ref 0–4)

## 2017-12-21 PROCEDURE — 74177 CT ABD & PELVIS W/CONTRAST: CPT

## 2017-12-21 PROCEDURE — 83690 ASSAY OF LIPASE: CPT | Performed by: EMERGENCY MEDICINE

## 2017-12-21 PROCEDURE — 80053 COMPREHEN METABOLIC PANEL: CPT | Performed by: EMERGENCY MEDICINE

## 2017-12-21 PROCEDURE — 96361 HYDRATE IV INFUSION ADD-ON: CPT

## 2017-12-21 PROCEDURE — 36415 COLL VENOUS BLD VENIPUNCTURE: CPT | Performed by: EMERGENCY MEDICINE

## 2017-12-21 PROCEDURE — 99283 EMERGENCY DEPT VISIT LOW MDM: CPT

## 2017-12-21 PROCEDURE — 74011250636 HC RX REV CODE- 250/636: Performed by: EMERGENCY MEDICINE

## 2017-12-21 PROCEDURE — 96376 TX/PRO/DX INJ SAME DRUG ADON: CPT

## 2017-12-21 PROCEDURE — 96374 THER/PROPH/DIAG INJ IV PUSH: CPT

## 2017-12-21 PROCEDURE — 81001 URINALYSIS AUTO W/SCOPE: CPT | Performed by: EMERGENCY MEDICINE

## 2017-12-21 PROCEDURE — 74011636320 HC RX REV CODE- 636/320: Performed by: EMERGENCY MEDICINE

## 2017-12-21 PROCEDURE — 85025 COMPLETE CBC W/AUTO DIFF WBC: CPT | Performed by: EMERGENCY MEDICINE

## 2017-12-21 PROCEDURE — 83605 ASSAY OF LACTIC ACID: CPT | Performed by: EMERGENCY MEDICINE

## 2017-12-21 PROCEDURE — 96375 TX/PRO/DX INJ NEW DRUG ADDON: CPT

## 2017-12-21 RX ORDER — OXYCODONE AND ACETAMINOPHEN 5; 325 MG/1; MG/1
1 TABLET ORAL
Qty: 20 TAB | Refills: 0 | Status: SHIPPED | OUTPATIENT
Start: 2017-12-21 | End: 2018-06-15 | Stop reason: ALTCHOICE

## 2017-12-21 RX ORDER — FENTANYL CITRATE 50 UG/ML
100 INJECTION, SOLUTION INTRAMUSCULAR; INTRAVENOUS
Status: COMPLETED | OUTPATIENT
Start: 2017-12-21 | End: 2017-12-21

## 2017-12-21 RX ORDER — ONDANSETRON 4 MG/1
4 TABLET, ORALLY DISINTEGRATING ORAL
Qty: 10 TAB | Refills: 0 | Status: SHIPPED | OUTPATIENT
Start: 2017-12-21 | End: 2018-06-15 | Stop reason: ALTCHOICE

## 2017-12-21 RX ORDER — SODIUM CHLORIDE 0.9 % (FLUSH) 0.9 %
10 SYRINGE (ML) INJECTION
Status: COMPLETED | OUTPATIENT
Start: 2017-12-21 | End: 2017-12-21

## 2017-12-21 RX ORDER — FENTANYL CITRATE 50 UG/ML
50 INJECTION, SOLUTION INTRAMUSCULAR; INTRAVENOUS
Status: COMPLETED | OUTPATIENT
Start: 2017-12-21 | End: 2017-12-21

## 2017-12-21 RX ORDER — SODIUM CHLORIDE 9 MG/ML
50 INJECTION, SOLUTION INTRAVENOUS
Status: COMPLETED | OUTPATIENT
Start: 2017-12-21 | End: 2017-12-21

## 2017-12-21 RX ORDER — ONDANSETRON 2 MG/ML
4 INJECTION INTRAMUSCULAR; INTRAVENOUS
Status: COMPLETED | OUTPATIENT
Start: 2017-12-21 | End: 2017-12-21

## 2017-12-21 RX ADMIN — SODIUM CHLORIDE 1000 ML: 900 INJECTION, SOLUTION INTRAVENOUS at 02:54

## 2017-12-21 RX ADMIN — FENTANYL CITRATE 50 MCG: 50 INJECTION, SOLUTION INTRAMUSCULAR; INTRAVENOUS at 05:38

## 2017-12-21 RX ADMIN — IOPAMIDOL 100 ML: 755 INJECTION, SOLUTION INTRAVENOUS at 03:37

## 2017-12-21 RX ADMIN — FENTANYL CITRATE 50 MCG: 50 INJECTION, SOLUTION INTRAMUSCULAR; INTRAVENOUS at 02:46

## 2017-12-21 RX ADMIN — SODIUM CHLORIDE 50 ML/HR: 900 INJECTION, SOLUTION INTRAVENOUS at 03:38

## 2017-12-21 RX ADMIN — Medication 10 ML: at 03:38

## 2017-12-21 RX ADMIN — ONDANSETRON HYDROCHLORIDE 4 MG: 2 INJECTION, SOLUTION INTRAMUSCULAR; INTRAVENOUS at 02:42

## 2017-12-21 NOTE — ED PROVIDER NOTES
EMERGENCY DEPARTMENT HISTORY AND PHYSICAL EXAM      Date: 12/21/2017  Patient Name: Evelina Munoz    History of Presenting Illness     Chief Complaint   Patient presents with    Abdominal Pain     started as left lower quadrant abdominal pain and now radiating to both sides. small bowel movement yesterday. associated nausea and lower back pain denies fever. History Provided By: Patient    HPI: Evelina Munoz is a 67 y.o. male, who presents ambulatory to the ED c/o worsening, constant, cramping abdominal pain x 5 days. Pt states he was diagnosed with Diverticulitis by his PCP, Dr. Nathalie Andrew x 2 days ago. Pt was treated with Flagyl and Cipro. Pt reports his pain was improving x2 days ago, but started to progressively worsen yesterday prompting visit to the ED. He notes associated nausea and constipation. Pt specifically denies any fever, congestion, cough, shortness of breath, chest pain, vomiting, diarrhea, dysuria, or urinary frequency. PCP: Manish Cotton MD    PMHx: Significant for HTN, HCL, COPD, Arthritis, skin cancer  PSHx: Significant for Orthopedic surgery  Social Hx: -tobacco (former), -EtOH, -Illicit Drugs     There are no other complaints, changes, or physical findings at this time. Current Outpatient Prescriptions   Medication Sig Dispense Refill    oxyCODONE-acetaminophen (PERCOCET) 5-325 mg per tablet Take 1 Tab by mouth every four (4) hours as needed for Pain. Max Daily Amount: 6 Tabs. 20 Tab 0    ondansetron (ZOFRAN ODT) 4 mg disintegrating tablet Take 1 Tab by mouth every eight (8) hours as needed for Nausea. 10 Tab 0    ciprofloxacin HCl (CIPRO) 500 mg tablet Take 1 Tab by mouth two (2) times a day. 20 Tab 0    metroNIDAZOLE (FLAGYL) 500 mg tablet Take 1 Tab by mouth three (3) times daily. 30 Tab 0    pravastatin (PRAVACHOL) 40 mg tablet TAKE 1 TABLET DAILY 90 Tab 3    aspirin 81 mg chewable tablet Take 1 Tab by mouth daily.  30 Tab 11    tadalafil (CIALIS) 5 mg tablet TAKE 1 TABLET daily 30 Tab 5    lisinopril (PRINIVIL, ZESTRIL) 20 mg tablet TAKE 1 TABLET DAILY 90 Tab 3       Past History     Past Medical History:  Past Medical History:   Diagnosis Date    Arthritis     knee    Cancer ([address]     skin    Chronic obstructive pulmonary disease ([facility] [address]     Hypercholesterolemia     Hypertension     Ill-defined condition     lipids    Smoking 1/22/2010    Stopped 04/2000        Past Surgical History:  Past Surgical History:   Procedure Laterality Date    COLORECTAL SCRN; HI RISK IND  1/12/2016         ENDOSCOPY, COLON, DIAGNOSTIC  07/16/2007    Dr Joel Montoya polyp repeat 07/2010    HX ORTHOPAEDIC Right 3/2016    arthroscopic; torn medial meniscus       Family History:  Family History   Problem Relation Age of Onset    Hypertension Mother     Thyroid Disease Mother     Kidney Disease Father     Cancer Father      skin    Diabetes Father     Cancer Paternal Aunt      lung    Cancer Paternal Uncle      skin       Social History:  Social History   Substance Use Topics    Smoking status: Former Smoker     Packs/day: 2.00     Years: 40.00     Quit date: 4/1/2000    Smokeless tobacco: Never Used    Alcohol use 0.0 oz/week     2 - 3 Cans of beer per week      Comment: Soc       Allergies: Allergies   Allergen Reactions    Macrobid [Nitrofurantoin Monohyd/M-Cryst] Unknown (comments)    Pravastatin Myalgia     Patient states taking pravastatin at this time.  Tetracycline Unknown (comments)         Review of Systems   Review of Systems   Constitutional: Negative for chills and fever. HENT: Negative. Eyes: Negative. Respiratory: Negative for cough, chest tightness and shortness of breath. Cardiovascular: Negative for chest pain and leg swelling. Gastrointestinal: Positive for abdominal pain, constipation and nausea. Negative for diarrhea and vomiting. Endocrine: Negative. Genitourinary: Negative for difficulty urinating and dysuria.    Musculoskeletal: Negative for myalgias. Skin: Negative. Neurological: Negative. Psychiatric/Behavioral: Negative. All other systems reviewed and are negative. Physical Exam   Physical Exam   Constitutional: He is oriented to person, place, and time. He appears well-developed and well-nourished. No distress. HENT:   Head: Normocephalic and atraumatic. Nose: Nose normal.   Mouth/Throat: No oropharyngeal exudate. Eyes: Conjunctivae and EOM are normal. Pupils are equal, round, and reactive to light. Neck: Normal range of motion. Neck supple. No JVD present. Cardiovascular: Normal rate, regular rhythm, normal heart sounds and intact distal pulses. Exam reveals no friction rub. No murmur heard. Pulmonary/Chest: Effort normal and breath sounds normal. No stridor. No respiratory distress. He has no wheezes. He has no rales. Abdominal: Soft. Bowel sounds are normal. He exhibits no distension. There is tenderness. There is no rebound. Musculoskeletal: Normal range of motion. He exhibits no tenderness. Neurological: He is alert and oriented to person, place, and time. No cranial nerve deficit. Skin: Skin is warm and dry. No rash noted. He is not diaphoretic. Psychiatric: He has a normal mood and affect. His speech is normal and behavior is normal. Judgment and thought content normal. Cognition and memory are normal.   Nursing note and vitals reviewed.         Diagnostic Study Results     Labs -     Recent Results (from the past 12 hour(s))   CBC WITH AUTOMATED DIFF    Collection Time: 12/21/17  2:35 AM   Result Value Ref Range    WBC 9.3 4.1 - 11.1 K/uL    RBC 4.60 4.10 - 5.70 M/uL    HGB 13.6 12.1 - 17.0 g/dL    HCT 41.2 36.6 - 50.3 %    MCV 89.6 80.0 - 99.0 FL    MCH 29.6 26.0 - 34.0 PG    MCHC 33.0 30.0 - 36.5 g/dL    RDW 13.8 11.5 - 14.5 %    PLATELET 483 101 - 698 K/uL    NEUTROPHILS 71 32 - 75 %    LYMPHOCYTES 15 12 - 49 %    MONOCYTES 10 5 - 13 %    EOSINOPHILS 4 0 - 7 %    BASOPHILS 0 0 - 1 % ABS. NEUTROPHILS 6.6 1.8 - 8.0 K/UL    ABS. LYMPHOCYTES 1.4 0.8 - 3.5 K/UL    ABS. MONOCYTES 0.9 0.0 - 1.0 K/UL    ABS. EOSINOPHILS 0.4 0.0 - 0.4 K/UL    ABS. BASOPHILS 0.0 0.0 - 0.1 K/UL   METABOLIC PANEL, COMPREHENSIVE    Collection Time: 12/21/17  2:35 AM   Result Value Ref Range    Sodium 140 136 - 145 mmol/L    Potassium 3.9 3.5 - 5.1 mmol/L    Chloride 106 97 - 108 mmol/L    CO2 27 21 - 32 mmol/L    Anion gap 7 5 - 15 mmol/L    Glucose 104 (H) 65 - 100 mg/dL    BUN 13 6 - 20 MG/DL    Creatinine 1.08 0.70 - 1.30 MG/DL    BUN/Creatinine ratio 12 12 - 20      GFR est AA >60 >60 ml/min/1.73m2    GFR est non-AA >60 >60 ml/min/1.73m2    Calcium 8.8 8.5 - 10.1 MG/DL    Bilirubin, total 0.4 0.2 - 1.0 MG/DL    ALT (SGPT) 18 12 - 78 U/L    AST (SGOT) 11 (L) 15 - 37 U/L    Alk.  phosphatase 126 (H) 45 - 117 U/L    Protein, total 7.3 6.4 - 8.2 g/dL    Albumin 3.0 (L) 3.5 - 5.0 g/dL    Globulin 4.3 (H) 2.0 - 4.0 g/dL    A-G Ratio 0.7 (L) 1.1 - 2.2     LACTIC ACID    Collection Time: 12/21/17  2:35 AM   Result Value Ref Range    Lactic acid 0.8 0.4 - 2.0 MMOL/L   LIPASE    Collection Time: 12/21/17  2:35 AM   Result Value Ref Range    Lipase 96 73 - 393 U/L   URINALYSIS W/ REFLEX CULTURE    Collection Time: 12/21/17  4:50 AM   Result Value Ref Range    Color YELLOW/STRAW      Appearance CLEAR CLEAR      Specific gravity <1.005 1.003 - 1.030    pH (UA) 6.0 5.0 - 8.0      Protein NEGATIVE  NEG mg/dL    Glucose NEGATIVE  NEG mg/dL    Ketone NEGATIVE  NEG mg/dL    Bilirubin NEGATIVE  NEG      Blood NEGATIVE  NEG      Urobilinogen 0.2 0.2 - 1.0 EU/dL    Nitrites NEGATIVE  NEG      Leukocyte Esterase NEGATIVE  NEG      UA:UC IF INDICATED CULTURE NOT INDICATED BY UA RESULT CNI      WBC 0-4 0 - 4 /hpf    RBC 0-5 0 - 5 /hpf    Epithelial cells FEW FEW /lpf    Bacteria NEGATIVE  NEG /hpf    Hyaline cast 0-2 0 - 5 /lpf       Radiologic Studies -     CT Results  (Last 48 hours)               12/21/17 0346  CT ABD PELV W CONT Final result    Impression:  IMPRESSION: Small bowel diverticulitis. Narrative:  CT ABDOMEN AND PELVIS WITH CONTRAST. 12/21/2017 3:46 AM        INDICATION: Abdominal pain, diverticulitis. Left lower quadrant abdominal pain   now radiating to both sides. Nausea and lower back pain. No fever. COMPARISON: None. TECHNIQUE: CT of the abdomen and pelvis was performed after the administration   of 100 cc IV Isovue-370. CT dose reduction was achieved through use of a   standardized protocol tailored for this examination and automatic exposure   control for dose modulation. FINDINGS:   Abdomen: Incidental note is made of gallstones. The lung bases are clear. The   heart size is normal. The distal esophagus, stomach, duodenum, liver, pancreas,   spleen, adrenals, and kidneys are normal.       Pelvis: In addition to colonic diverticulosis, the patient has diffuse small   bowel diverticulosis. It is one of these small bowel diverticula that is   inflamed in the left lower quadrant ([id number]). Trace free pelvic fluid is   associated. No free air. The appendix and bladder are normal.                 Medical Decision Making   I am the first provider for this patient. I reviewed the vital signs, available nursing notes, past medical history, past surgical history, family history and social history. Vital Signs-Reviewed the patient's vital signs.   Patient Vitals for the past 12 hrs:   Temp Pulse Resp BP SpO2   12/21/17 0111 98.1 °F (36.7 °C) 87 14 131/85 98 %       Pulse Oximetry Analysis - 98% on RA    Cardiac Monitor:   Rate: 87 bpm  Rhythm: Normal Sinus Rhythm      Records Reviewed: Nursing Notes, Old Medical Records, Previous Radiology Studies and Previous Laboratory Studies    Provider Notes (Medical Decision Making):     DDX:  Diverticulosis/litis, perforation, uti, kidney stones    Plan:  Labs, ua, ct scan, analgesic    Impression:  Diverticulitis without perf or abscess    ED Course:   Initial assessment performed. The patients presenting problems have been discussed, and they are in agreement with the care plan formulated and outlined with them. I have encouraged them to ask questions as they arise throughout their visit. I reviewed our electronic medical record system for any past medical records that were available that may contribute to the patients current condition, the nursing notes and and vital signs from today's visit    Nursing notes will be reviewed as they become available in realtime while the pt has been in the ED. Bessie Salas MD    I personally reviewed pt's imaging. Official read by radiology listed below. Bessie Salas MD    PROGRESS NOTE:  4:48 AM  Pt reevaluated. Pt resulted abdominal CT shows Small bowel diverticulitis w/o elevated white count. Will PO challenge pt and continue to monitor. Written by Maciel Aparicio ED Scribe, as dictated by Bessie Salas MD      5:55 AM  Progress note:  Pt noted to be feeling better, ready for discharge. Discussed lab and imaging findings with pt and/or family, specifically noting Small bowel diverticulitis. Pt will follow up as instructed. All questions have been answered, pt voiced understanding and agreement with plan. If narcotics were prescribed, pt was advised not to drive or operate heavy machinery. If abx were prescribed, pt advised that diarrhea and rash are possible side effects of the medications. Specific return precautions provided in addition to instructions for pt to return to the ED immediately should sx worsen at any time. Bessie Salas MD      PLAN:  1. Current Discharge Medication List      START taking these medications    Details   oxyCODONE-acetaminophen (PERCOCET) 5-325 mg per tablet Take 1 Tab by mouth every four (4) hours as needed for Pain. Max Daily Amount: 6 Tabs.   Qty: 20 Tab, Refills: 0    Associated Diagnoses: Diverticulitis large intestine w/o perforation or abscess w/o bleeding; Diverticulitis of small intestine without perforation or abscess without bleeding      ondansetron (ZOFRAN ODT) 4 mg disintegrating tablet Take 1 Tab by mouth every eight (8) hours as needed for Nausea. Qty: 10 Tab, Refills: 0           2. Follow-up Information     Follow up With Details Comments Contact Info    Domi Castaneda MD Schedule an appointment as soon as possible for a visit in 2 days  [facility]      Eliza Pino MD  As needed [facility]  [address] [address] [phone]          Return to ED if worse     Disposition:    5:56 AM   The patient's results have been reviewed with family and/or caregiver. They verbally convey their understanding and agreement of the patient's signs, symptoms, diagnosis, treatment and prognosis and additionally agree to follow up as recommended in the discharge instructions or to return to the Emergency Room should the patient's condition change prior to their follow-up appointment. The family and/or caregiver verbally agrees with the care-plan and all of their questions have been answered. The discharge instructions have also been provided to the them with educational information regarding the patient's diagnosis as well a list of reasons why the patient would want to return to the ER prior to their follow-up appointment should their condition change. Ricardo Loyd MD        Diagnosis     Clinical Impression:   1. Diverticulitis large intestine w/o perforation or abscess w/o bleeding    2. Diverticulitis of small intestine without perforation or abscess without bleeding        Attestations: This note is prepared by Osiel Keene, acting as Scribe for MD Ricardo Casiano MD : The scribe's documentation has been prepared under my direction and personally reviewed by me in its entirety.  I confirm that the note above accurately reflects all work, treatment, procedures, and medical decision making performed by me. This note will not be viewable in [address]

## 2017-12-21 NOTE — DISCHARGE INSTRUCTIONS
Diverticulitis: Care Instructions  Your Care Instructions    Diverticulitis occurs when pouches form in the wall of the colon and become inflamed or infected. It can be very painful. Doctors aren't sure what causes diverticulitis. There is no proof that foods such as nuts, seeds, or berries cause it or make it worse. A low-fiber diet may cause the colon to work harder to push stool forward. Pouches may form because of this extra work. It may be hard to think about healthy eating while you're in pain. But as you recover, you might think about how you can use healthy eating for overall better health. Healthy eating may help you avoid future attacks. Follow-up care is a key part of your treatment and safety. Be sure to make and go to all appointments, and call your doctor if you are having problems. It's also a good idea to know your test results and keep a list of the medicines you take. How can you care for yourself at home? · Drink plenty of fluids, enough so that your urine is light yellow or clear like water. If you have kidney, heart, or liver disease and have to limit fluids, talk with your doctor before you increase the amount of fluids you drink. · Stick to liquids or a bland diet (plain rice, bananas, dry toast or crackers, applesauce) until you are feeling better. Then you can return to regular foods and gradually increase the amount of fiber in your diet. · Use a heating pad set on low on your belly to relieve mild cramps and pain. · Get extra rest until you are feeling better. · Be safe with medicines. Read and follow all instructions on the label. ¨ If the doctor gave you a prescription medicine for pain, take it as prescribed. ¨ If you are not taking a prescription pain medicine, ask your doctor if you can take an over-the-counter medicine. · If your doctor prescribed antibiotics, take them as directed. Do not stop taking them just because you feel better.  You need to take the full course of antibiotics. To prevent future attacks of diverticulitis  · Avoid constipation:  ¨ Include fruits, vegetables, beans, and whole grains in your diet each day. These foods are high in fiber. ¨ Drink plenty of fluids, enough so that your urine is light yellow or clear like water. If you have kidney, heart, or liver disease and have to limit fluids, talk with your doctor before you increase the amount of fluids you drink. ¨ Get some exercise every day. Build up slowly to 30 to 60 minutes a day on 5 or more days of the week. ¨ Take a fiber supplement, such as Citrucel or Metamucil, every day if needed. Read and follow all instructions on the label. ¨ Schedule time each day for a bowel movement. Having a daily routine may help. Take your time and do not strain when having a bowel movement. When should you call for help? Call your doctor now or seek immediate medical care if:  ? · You have a fever. ? · You are vomiting. ? · You have new or worse belly pain. ? · You cannot pass stools or gas. ? Watch closely for changes in your health, and be sure to contact your doctor if you have any problems. Where can you learn more? Go to http://ronald-biju.info/. Enter H901 in the search box to learn more about \"Diverticulitis: Care Instructions. \"  Current as of: May 12, 2017  Content Version: 11.4  © 2547-6048 [employer]. Care instructions adapted under license by [employer] (which disclaims liability or warranty for this information). If you have questions about a medical condition or this instruction, always ask your healthcare professional. [place] [address] any warranty or liability for your use of this information.

## 2017-12-21 NOTE — ED TRIAGE NOTES
Assumed care of pt from triage ambulatory with pt. Pt reports CC of abdominal pain throughout abdomin. Pt also reports back pain. Pain started Friday. Pt was seen at PCP on Monday and told they thought it was diverticulitis. Pt has continued to have pain and was instructed to come to the ER. Pt on monitor x 2. A&O x 4. Pt denies N/V/D. Pt denies SOB. Pt reports pain is worse after eating.

## 2017-12-21 NOTE — ED NOTES
Pt discharged by Dr. Shirley Barros. Pt provided with discharge instructions Rx and instructions on follow up care.

## 2017-12-21 NOTE — ED TRIAGE NOTES
Chief Complaint: left lower abdominal pain   Patient states lower abdominal pain now across the abdomen. Now associated with nausea  Onset Friday  Pt arrived via private car.

## 2018-04-17 NOTE — PROGRESS NOTES
Lab is normal except very mild elevation in CK (a muscle protein). It is not high enough to be of any concern, nor is it a cause or consequence of leg pain. Reduce activity as we discussed. I pain is resolved, start back slowly. Let me know how the pain is without taking pravastatin. Patient informed in My Chart. sports related

## 2018-04-24 ENCOUNTER — HOSPITAL ENCOUNTER (OUTPATIENT)
Dept: LAB | Age: 73
Discharge: HOME | End: 2018-04-24
Payer: MEDICARE

## 2018-04-24 ENCOUNTER — LAB ONLY (OUTPATIENT)
Dept: INTERNAL MEDICINE CLINIC | Facility: CLINIC | Age: 73
End: 2018-04-24

## 2018-04-24 DIAGNOSIS — E78.00 HYPERCHOLESTEROLEMIA: ICD-10-CM

## 2018-04-24 PROCEDURE — 80061 LIPID PANEL: CPT

## 2018-04-24 PROCEDURE — 80053 COMPREHEN METABOLIC PANEL: CPT

## 2018-04-24 PROCEDURE — 36415 COLL VENOUS BLD VENIPUNCTURE: CPT

## 2018-04-25 LAB
ALBUMIN SERPL-MCNC: 4.1 G/DL (ref 3.5–4.8)
ALBUMIN/GLOB SERPL: 1.6 {RATIO} (ref 1.2–2.2)
ALP SERPL-CCNC: 110 IU/L (ref 39–117)
ALT SERPL-CCNC: 17 IU/L (ref 0–44)
AST SERPL-CCNC: 22 IU/L (ref 0–40)
BILIRUB SERPL-MCNC: 0.4 MG/DL (ref 0–1.2)
BUN SERPL-MCNC: 14 MG/DL (ref 8–27)
BUN/CREAT SERPL: 14 (ref 10–24)
CALCIUM SERPL-MCNC: 9.5 MG/DL (ref 8.6–10.2)
CHLORIDE SERPL-SCNC: 105 MMOL/L (ref 96–106)
CHOLEST SERPL-MCNC: 144 MG/DL (ref 100–199)
CO2 SERPL-SCNC: 23 MMOL/L (ref 18–29)
CREAT SERPL-MCNC: 1.03 MG/DL (ref 0.76–1.27)
GFR SERPLBLD CREATININE-BSD FMLA CKD-EPI: 72 ML/MIN/1.73
GFR SERPLBLD CREATININE-BSD FMLA CKD-EPI: 84 ML/MIN/1.73
GLOBULIN SER CALC-MCNC: 2.6 G/DL (ref 1.5–4.5)
GLUCOSE SERPL-MCNC: 87 MG/DL (ref 65–99)
HDLC SERPL-MCNC: 49 MG/DL
LDLC SERPL CALC-MCNC: 76 MG/DL (ref 0–99)
POTASSIUM SERPL-SCNC: 4.8 MMOL/L (ref 3.5–5.2)
PROT SERPL-MCNC: 6.7 G/DL (ref 6–8.5)
SODIUM SERPL-SCNC: 145 MMOL/L (ref 134–144)
TRIGL SERPL-MCNC: 97 MG/DL (ref 0–149)
VLDLC SERPL CALC-MCNC: 19 MG/DL (ref 5–40)

## 2018-06-15 ENCOUNTER — OFFICE VISIT (OUTPATIENT)
Dept: INTERNAL MEDICINE CLINIC | Facility: CLINIC | Age: 73
End: 2018-06-15

## 2018-06-15 VITALS
RESPIRATION RATE: 16 BRPM | TEMPERATURE: 97.6 F | HEART RATE: 67 BPM | BODY MASS INDEX: 24.34 KG/M2 | DIASTOLIC BLOOD PRESSURE: 88 MMHG | SYSTOLIC BLOOD PRESSURE: 139 MMHG | OXYGEN SATURATION: 97 % | WEIGHT: 170 LBS | HEIGHT: 70 IN

## 2018-06-15 DIAGNOSIS — I10 HYPERTENSION, ESSENTIAL: ICD-10-CM

## 2018-06-15 DIAGNOSIS — Z00.00 MEDICARE ANNUAL WELLNESS VISIT, SUBSEQUENT: Primary | ICD-10-CM

## 2018-06-15 DIAGNOSIS — Z71.89 ADVANCE DIRECTIVE DISCUSSED WITH PATIENT: ICD-10-CM

## 2018-06-15 DIAGNOSIS — E78.00 HYPERCHOLESTEROLEMIA: ICD-10-CM

## 2018-06-15 DIAGNOSIS — Z13.31 SCREENING FOR DEPRESSION: ICD-10-CM

## 2018-06-15 DIAGNOSIS — N52.9 ERECTILE DYSFUNCTION, UNSPECIFIED ERECTILE DYSFUNCTION TYPE: ICD-10-CM

## 2018-06-15 RX ORDER — TADALAFIL 5 MG/1
TABLET ORAL
Qty: 30 TAB | Refills: 5 | Status: SHIPPED | OUTPATIENT
Start: 2018-06-15 | End: 2019-08-22 | Stop reason: SDUPTHER

## 2018-06-15 NOTE — PROGRESS NOTES
Suzanne Bedoya  Identified pt with two pt identifiers(name and ). Chief Complaint   Patient presents with    Blood Pressure Check    Cholesterol Problem       Reviewed record In preparation for visit and have obtained necessary documentation. Advanced directive / living will on file. 1. Have you been to the ER, urgent care clinic or hospitalized since your last visit? [id number] [address] [facility] ER 18    2. Have you seen or consulted any other health care providers outside of the [facility] since your last visit? Include any pap smears or colon screening. Dermatology     Vitals reviewed with provider.     Health Maintenance reviewed:     Health Maintenance Due   Topic    MEDICARE YEARLY EXAM     GLAUCOMA SCREENING Q2Y           Wt Readings from Last 3 Encounters:   06/15/18 170 lb (77.1 kg)   17 167 lb 8.8 oz (76 kg)   17 168 lb (76.2 kg)        Temp Readings from Last 3 Encounters:   06/15/18 97.6 °F (36.4 °C) (Oral)   17 98.1 °F (36.7 °C)   17 97.8 °F (36.6 °C) (Oral)        BP Readings from Last 3 Encounters:   06/15/18 139/88   17 131/85   17 123/90        Pulse Readings from Last 3 Encounters:   06/15/18 67   17 87   17 71        Vitals:    06/15/18 0942   BP: 139/88   Pulse: 67   Resp: 16   Temp: 97.6 °F (36.4 °C)   TempSrc: Oral   SpO2: 97%   Weight: 170 lb (77.1 kg)   Height: 5' 10\" (1.778 m)   PainSc:   0 - No pain          Learning Assessment:   :       Learning Assessment 2017   PRIMARY LEARNER Patient Patient Patient   HIGHEST LEVEL OF EDUCATION - PRIMARY LEARNER  - - DID NOT GRADUATE HIGH SCHOOL   BARRIERS PRIMARY LEARNER - - NONE   CO-LEARNER CAREGIVER - - No   PRIMARY LANGUAGE ENGLISH ENGLISH ENGLISH   LEARNER PREFERENCE PRIMARY DEMONSTRATION DEMONSTRATION READING   ANSWERED BY patient patient self   RELATIONSHIP SELF SELF SELF        Depression Screening:   :       PHQ over the last two weeks 6/15/2018   Cindy interest or pleasure in doing things Not at all   Feeling down, depressed or hopeless Not at all   Total Score PHQ 2 0        Fall Risk Assessment:   :       Fall Risk Assessment, last 12 mths 6/15/2018   Able to walk? Yes   Fall in past 12 months? No   Fall with injury? -   Number of falls in past 12 months -   Fall Risk Score -        Abuse Screening:   :       Abuse Screening Questionnaire 10/24/2017 10/11/2016 9/10/2015 9/17/2014   Do you ever feel afraid of your partner? N N N N   Are you in a relationship with someone who physically or mentally threatens you? N N N N   Is it safe for you to go home?  Y Y Y Y        ADL Screening:   :       ADL Assessment 3/31/2015   Feeding yourself No Help Needed   Getting from bed to chair No Help Needed   Getting dressed No Help Needed   Bathing or showering No Help Needed   Walk across the room (includes cane/walker) No Help Needed   Using the telphone No Help Needed   Taking your medications No Help Needed   Preparing meals No Help Needed   Managing money (expenses/bills) No Help Needed   Moderately strenuous housework (laundry) No Help Needed   Shopping for personal items (toiletries/medicines) No Help Needed   Shopping for groceries No Help Needed   Driving No Help Needed   Climbing a flight of stairs No Help Needed   Getting to places beyond walking distances No Help Needed

## 2018-06-15 NOTE — PROGRESS NOTES
HISTORY OF PRESENT ILLNESS  Nica Rodriguez is a 68 y.o. male. HPI  He presents for follow up of hypertension, hyperlipidemia and erectile dysfunction. Diet and Lifestyle: generally follows a low fat low cholesterol diet, generally follows a low sodium diet, exercises sporadically, nonsmoker  Medication compliance: compliant all of the time  Medication side effects: none  Home BP Monitoring: not done  Cardiovascular ROS:  He complains of dizziness.  (when extending neck to look up.)  He denies palpitations, orthopnea, exertional chest pressure/discomfort, claudication, lower extremity edema, dyspnea on exertion     He is being seen for follow up of COPD. no significant ongoing wheezing or shortness of breath.    Uses rescue inhaler: 0  times /week  Smoking: former smoker    Patient Active Problem List   Diagnosis Code    Hypercholesterolemia E78.00    Hypertension, essential I10    S/P colonoscopy J08.592    ED (erectile dysfunction) N52.9    Actinic keratosis L57.0    COPD, moderate ([address] J44.9     Past Medical History:   Diagnosis Date    Arthritis     knee    Cancer ([facility] [address]     skin    Chronic obstructive pulmonary disease ([facility] [address]     Hypercholesterolemia     Hypertension     Ill-defined condition     lipids    Smoking 1/22/2010    Stopped 04/2000      Past Surgical History:   Procedure Laterality Date    COLORECTAL SCRN; HI RISK IND  1/12/2016         ENDOSCOPY, COLON, DIAGNOSTIC  07/16/2007    Dr Carmine Jefferson polyp repeat 07/2010    HX ORTHOPAEDIC Right 3/2016    arthroscopic; torn medial meniscus     Social History     Social History    Marital status:      Spouse name: N/A    Number of children: 3    Years of education: N/A     Occupational History    construction      Social History Main Topics    Smoking status: Former Smoker     Packs/day: 2.00     Years: 40.00     Quit date: 4/1/2000    Smokeless tobacco: Never Used    Alcohol use 0.0 oz/week     2 - 3 Cans of beer per week Comment: Soc    Drug use: No    Sexual activity: Not Asked     Other Topics Concern    None     Social History Narrative     Family History   Problem Relation Age of Onset    Hypertension Mother     Thyroid Disease Mother     Kidney Disease Father     Cancer Father      skin    Diabetes Father     Cancer Paternal Aunt      lung    Cancer Paternal Uncle      skin     Allergies   Allergen Reactions    Macrobid [Nitrofurantoin Monohyd/M-Cryst] Unknown (comments)    Pravastatin Myalgia     Patient states taking pravastatin at this time.  Tetracycline Unknown (comments)     Current Outpatient Prescriptions   Medication Sig Dispense Refill    lisinopril (PRINIVIL, ZESTRIL) 20 mg tablet TAKE 1 TABLET DAILY 90 Tab 1    pravastatin (PRAVACHOL) 40 mg tablet TAKE 1 TABLET DAILY 90 Tab 3    tadalafil (CIALIS) 5 mg tablet TAKE 1 TABLET daily 30 Tab 5    aspirin 81 mg chewable tablet Take 1 Tab by mouth daily. 30 Tab 11       Review of Systems   Constitutional: Negative for malaise/fatigue and weight loss. Gastrointestinal: Negative for constipation and diarrhea. Musculoskeletal: Positive for joint pain (knees and ankles, stiff more than painful ). Negative for back pain. Neurological: Positive for tingling (off and on left arm from radiculopathy). Negative for focal weakness. Visit Vitals    /88 (BP 1 Location: Left arm, BP Patient Position: Sitting)    Pulse 67    Temp 97.6 °F (36.4 °C) (Oral)    Resp 16    Ht 5' 10\" (1.778 m)    Wt 170 lb (77.1 kg)    SpO2 97%    BMI 24.39 kg/m2     Physical Exam   Constitutional: He is oriented to person, place, and time. He appears well-developed and well-nourished. HENT:   Head: Normocephalic and atraumatic. Eyes: Conjunctivae are normal. Pupils are equal, round, and reactive to light. Neck: Neck supple. Carotid bruit is not present. No thyromegaly present. Cardiovascular: Normal rate, regular rhythm and normal heart sounds.   PMI is not displaced. Exam reveals no gallop. No murmur heard. Pulses:       Dorsalis pedis pulses are 2+ on the right side, and 2+ on the left side. Posterior tibial pulses are 2+ on the right side, and 2+ on the left side. Pulmonary/Chest: Effort normal. He has no wheezes. He has no rhonchi. He has no rales. Abdominal: Soft. Normal appearance. He exhibits no abdominal bruit and no mass. There is no hepatosplenomegaly. There is no tenderness. Musculoskeletal: He exhibits no edema. Lymphadenopathy:     He has no cervical adenopathy. Right: No supraclavicular adenopathy present. Left: No supraclavicular adenopathy present. Neurological: He is alert and oriented to person, place, and time. No sensory deficit. Skin: Skin is warm, dry and intact. No rash noted. Psychiatric: He has a normal mood and affect. His behavior is normal.   Nursing note and vitals reviewed. Results for orders placed or performed in visit on 00/08/67   METABOLIC PANEL, COMPREHENSIVE   Result Value Ref Range    Glucose 87 65 - 99 mg/dL    BUN 14 8 - 27 mg/dL    Creatinine 1.03 0.76 - 1.27 mg/dL    GFR est non-AA 72 >59 mL/min/1.73    GFR est AA 84 >59 mL/min/1.73    BUN/Creatinine ratio 14 10 - 24    Sodium 145 (H) 134 - 144 mmol/L    Potassium 4.8 3.5 - 5.2 mmol/L    Chloride 105 96 - 106 mmol/L    CO2 23 18 - 29 mmol/L    Calcium 9.5 8.6 - 10.2 mg/dL    Protein, total 6.7 6.0 - 8.5 g/dL    Albumin 4.1 3.5 - 4.8 g/dL    GLOBULIN, TOTAL 2.6 1.5 - 4.5 g/dL    A-G Ratio 1.6 1.2 - 2.2    Bilirubin, total 0.4 0.0 - 1.2 mg/dL    Alk. phosphatase 110 39 - 117 IU/L    AST (SGOT) 22 0 - 40 IU/L    ALT (SGPT) 17 0 - 44 IU/L   LIPID PANEL   Result Value Ref Range    Cholesterol, total 144 100 - 199 mg/dL    Triglyceride 97 0 - 149 mg/dL    HDL Cholesterol 49 >39 mg/dL    VLDL, calculated 19 5 - 40 mg/dL    LDL, calculated 76 0 - 99 mg/dL       ASSESSMENT and PLAN    ICD-10-CM ICD-9-CM    1.  Medicare annual wellness visit, subsequent Z00.00 V70.0    2. Advance directive discussed with patient Z71.89 V65.49    3. Hypertension, essential I10 401.9    4. Hypercholesterolemia E78.00 272.0    5. Erectile dysfunction, unspecified erectile dysfunction type N52.9 607.84    6. Screening for depression Z13.89 V79.0 MD DEPRESSION SCREEN ANNUAL     Diagnoses and all orders for this visit:    1. Medicare annual wellness visit, subsequent    2. Advance directive discussed with patient    3. Hypertension, essential  Hypertension is controlled    4. Hypercholesterolemia  Hyperlipidemia is controlled    5. Erectile dysfunction, unspecified erectile dysfunction type         -     tadalafil (CIALIS) 5 mg tablet; TAKE 1 TABLET daily    6. Screening for depression--Negative  -     MD DEPRESSION SCREEN ANNUAL            Follow-up Disposition:  Return in about 6 months (around 12/15/2018) for HTN, chol  .  lab results and schedule of future lab studies reviewed with patient  reviewed diet, exercise and weight control  cardiovascular risk and specific lipid/LDL goals reviewed  I have discussed the diagnosis, evaluation and treatment options and the intended plan with the patient. Patient is in agreement. The patient has received an after-visit summary and questions were answered concerning future plans. I have discussed side effects and warnings of any new medications with the patient as well.

## 2018-06-15 NOTE — MR AVS SNAPSHOT
[address] [address] [phone] Patient: Isidra Maurer MRN: [id number] BSN:[id number] Visit Information Date & Time Provider Department Dept. Phone Encounter #  
 6/15/2018 10:00 AM MD [facility] [address] [place] [address] [id number] Follow-up Instructions Return in about 6 months (around 12/15/2018) for HTN, chol  . Your Appointments 12/20/2018  8:45 AM  
ROUTINE CARE with MD Charu Agee [facility]) Appt Note: 6 month f/u  
 [phone]  
  
   
 [address] [facility] Upcoming Health Maintenance Date Due  
 MEDICARE YEARLY EXAM 4/19/2018 GLAUCOMA SCREENING Q2Y 8/15/2018 DTaP/Tdap/Td series (1 - Tdap) 9/7/2022* Influenza Age 5 to Adult 8/1/2018 COLONOSCOPY 1/12/2021 *Topic was postponed. The date shown is not the original due date. Allergies as of 6/15/2018  Review Complete On: 6/15/2018 By: Niru Piña MD  
  
 Severity Noted Reaction Type Reactions Macrobid [Nitrofurantoin Monohyd/m-cryst]  01/22/2010    Unknown (comments) Pravastatin  04/18/2017    Myalgia Patient states taking pravastatin at this time. Tetracycline  01/22/2010    Unknown (comments) Current Immunizations  Reviewed on 6/15/2018 Name Date Influenza High Dose Vaccine PF 10/24/2017, 10/11/2016, 10/13/2015, 9/17/2014 Influenza Vaccine 12/30/2013 Influenza Vaccine Split 10/8/2012 Influenza Vaccine Whole 10/24/2011 Pneumococcal Conjugate (PCV-13) 3/31/2015 TD Vaccine 9/7/2012, 6/1/2007 ZZZ-RETIRED (DO NOT USE) Pneumococcal Vaccine (Unspecified Type) 10/24/2011 Zoster 5/28/2009 Reviewed by Nilsa Callaway LPN on 5/57/3160 at  9:25 AM  
You Were Diagnosed With   
  
 Codes Comments Medicare annual wellness visit, subsequent    -  Primary ICD-10-CM: Z00.00 ICD-9-CM: V70.0 Advance directive discussed with patient     ICD-10-CM: Z71.89 ICD-9-CM: V65.49 Hypertension, essential     ICD-10-CM: I10 
ICD-9-CM: 401.9 Hypercholesterolemia     ICD-10-CM: E78.00 ICD-9-CM: 272.0 Erectile dysfunction, unspecified erectile dysfunction type     ICD-10-CM: N52.9 ICD-9-CM: 607.84 Vitals BP Pulse Temp Resp Height(growth percentile) Weight(growth percentile) 139/88 (BP 1 Location: Left arm, BP Patient Position: Sitting) 67 97.6 °F (36.4 °C) (Oral) 16 5' 10\" (1.778 m) 170 lb (77.1 kg) SpO2 BMI Smoking Status 97% 24.39 kg/m2 Former Smoker BMI and BSA Data Body Mass Index Body Surface Area  
 24.39 kg/m 2 1.95 m 2 Preferred Pharmacy Pharmacy Name Phone [id number] - [address] [phone] Your Updated Medication List  
  
   
This list is accurate as of 6/15/18 10:50 AM.  Always use your most recent med list.  
  
  
  
  
 aspirin 81 mg chewable tablet Take 1 Tab by mouth daily. lisinopril 20 mg tablet Commonly known as:  PRINIVIL, ZESTRIL  
TAKE 1 TABLET DAILY pravastatin 40 mg tablet Commonly known as:  PRAVACHOL  
TAKE 1 TABLET DAILY  
  
 tadalafil 5 mg tablet Commonly known as:  CIALIS  
TAKE 1 TABLET daily Prescriptions Sent to Pharmacy Refills  
 tadalafil (CIALIS) 5 mg tablet 5 Sig: TAKE 1 TABLET daily Class: Normal  
 Pharmacy: [facility] #: [phone] Follow-up Instructions Return in about 6 months (around 12/15/2018) for HTN, chol  . Patient Instructions Office visit in 6 months The best way to stay healthy is to live a healthy lifestyle.  A healthy lifestyle includes regular exercise, eating a well-balanced diet, keeping a healthy weight and not smoking. Regular physical exams and screening tests are another important way to take care of yourself. Preventive exams provided by health care providers can find health problems early when treatment works best and can keep you from getting certain diseases or illnesses. Preventive services include exams, lab tests, screenings, shots, monitoring and information to help you take care of your own health. All people over 65 should have a pneumonia shot. Pneumonia shots are usually only needed once in a lifetime unless your doctor decides differently. In addition to your physical exam, some screening tests are recommended: 
 
All people over 65 should have a yearly flu shot. People over 65 are at medium to high risk for Hepatitis B. Three shots are needed for complete protection. Bone mass measurement (dexa scan) is recommended every two years. Diabetes Mellitus screening is recommended every year. Glaucoma is an eye disease caused by high pressure in the eye. An eye exam is recommended every year. Cardiovascular screening tests that check your cholesterol and other blood fat (lipid) levels are recommended every five years. Colorectal Cancer screening tests help to find pre-cancerous polyps (growths in the colon) so they can be removed before they turn into cancer. Tests ordered for screening depend on your personal and family history risk factors. Prostate Cancer Screening (annually up to age 76) Screening for breast cancer is recommended yearly with a Mammogram. 
 
Screening for cervical and vaginal cancer is recommended with a pelvic and Pap test every two years. However if you have had an abnormal pap in the past  three years or at high risk for cervical or vaginal cancer Medicare will cover a pap test and a pelvic exam every year. Here is a list of your current Health Maintenance items with a due date: 
Health Maintenance Due Topic Date Due  
 Annual Well Visit  04/19/2018  Glaucoma Screening   08/15/2018 Well Visit, Over 72: Care Instructions Your Care Instructions Physical exams can help you stay healthy. Your doctor has checked your overall health and may have suggested ways to take good care of yourself. He or she also may have recommended tests. At home, you can help prevent illness with healthy eating, regular exercise, and other steps. Follow-up care is a key part of your treatment and safety. Be sure to make and go to all appointments, and call your doctor if you are having problems. It's also a good idea to know your test results and keep a list of the medicines you take. How can you care for yourself at home? · Reach and stay at a healthy weight. This will lower your risk for many problems, such as obesity, diabetes, heart disease, and high blood pressure. · Get at least 30 minutes of exercise on most days of the week. Walking is a good choice. You also may want to do other activities, such as running, swimming, cycling, or playing tennis or team sports. · Do not smoke. Smoking can make health problems worse. If you need help quitting, talk to your doctor about stop-smoking programs and medicines. These can increase your chances of quitting for good. · Protect your skin from too much sun. When you're outdoors from 10 a.m. to 4 p.m., stay in the shade or cover up with clothing and a hat with a wide brim. Wear sunglasses that block UV rays. Even when it's cloudy, put broad-spectrum sunscreen (SPF 30 or higher) on any exposed skin. · See a dentist one or two times a year for checkups and to have your teeth cleaned. · Wear a seat belt in the car. · Limit alcohol to 2 drinks a day for men and 1 drink a day for women. Too much alcohol can cause health problems. Follow your doctor's advice about when to have certain tests. These tests can spot problems early. For men and women · Cholesterol. Your doctor will tell you how often to have this done based on your overall health and other things that can increase your risk for heart attack and stroke. · Blood pressure. Have your blood pressure checked during a routine doctor visit. Your doctor will tell you how often to check your blood pressure based on your age, your blood pressure results, and other factors. · Diabetes. Ask your doctor whether you should have tests for diabetes. · Vision. Experts recommend that you have yearly exams for glaucoma and other age-related eye problems. · Hearing. Tell your doctor if you notice any change in your hearing. You can have tests to find out how well you hear. · Colon cancer tests. Keep having colon cancer tests as your doctor recommends. You can have one of several types of tests. · Heart attack and stroke risk. At least every 4 to 6 years, you should have your risk for heart attack and stroke assessed. Your doctor uses factors such as your age, blood pressure, cholesterol, and whether you smoke or have diabetes to show what your risk for a heart attack or stroke is over the next 10 years. · Osteoporosis. Talk to your doctor about whether you should have a bone density test to find out whether you have thinning bones. Also ask your doctor about whether you should take calcium and vitamin D supplements. For women · Pap test and pelvic exam. You may no longer need a Pap test. Talk with your doctor about whether to stop or continue to have Pap tests. · Breast exam and mammogram. Ask how often you should have a mammogram, which is an X-ray of your breasts. A mammogram can spot breast cancer before it can be felt and when it is easiest to treat. · Thyroid disease.  Talk to your doctor about whether to have your thyroid checked as part of a regular physical exam. Women have an increased chance of a thyroid problem. For men · Prostate exam. Talk to your doctor about whether you should have a blood test (called a PSA test) for prostate cancer. Experts disagree on whether men should have this test. Some experts recommend that you discuss the benefits and risks of the test with your doctor. · Abdominal aortic aneurysm. Ask your doctor whether you should have a test to check for an aneurysm. You may need a test if you ever smoked or if your parent, brother, sister, or child has had an aneurysm. When should you call for help? Watch closely for changes in your health, and be sure to contact your doctor if you have any problems or symptoms that concern you. Where can you learn more? Go to http://ronald-biju.info/. Enter W028 in the search box to learn more about \"Well Visit, Over 65: Care Instructions. \" Current as of: May 12, 2017 Content Version: 11.4 © 0382-0780 [employer]. Care instructions adapted under license by [employer] (which disclaims liability or warranty for this information). If you have questions about a medical condition or this instruction, always ask your healthcare professional. [address] any warranty or liability for your use of this information. Introducing [facility]! Dear Nicole Moreau: 
Thank you for requesting a WiTric[employer] account. Our records indicate that you already have an active Wi[employer]ricity account. You can access your account anytime at [email or web address] Did you know that you can access your hospital and ER discharge instructions at any time in WiTricity? You can also review all of your test results from your hospital stay or ER visit. Additional Information If you have questions, please visit the Frequently Asked Questions section of the [employer] website at [email or web address] Remember, [employer] is NOT to be used for urgent needs. For medical emergencies, dial 911. Now available from your iPhone and Android! Please provide this summary of care documentation to your next provider. Your primary care clinician is listed as Hinds Sake. If you have any questions after today's visit, please call [phone].

## 2018-06-15 NOTE — PATIENT INSTRUCTIONS
Office visit in 6 months      The best way to stay healthy is to live a healthy lifestyle. A healthy lifestyle includes regular exercise, eating a well-balanced diet, keeping a healthy weight and not smoking. Regular physical exams and screening tests are another important way to take care of yourself. Preventive exams provided by health care providers can find health problems early when treatment works best and can keep you from getting certain diseases or illnesses. Preventive services include exams, lab tests, screenings, shots, monitoring and information to help you take care of your own health. All people over 65 should have a pneumonia shot. Pneumonia shots are usually only needed once in a lifetime unless your doctor decides differently. In addition to your physical exam, some screening tests are recommended:    All people over 65 should have a yearly flu shot. People over 65 are at medium to high risk for Hepatitis B. Three shots are needed for complete protection. Bone mass measurement (dexa scan) is recommended every two years. Diabetes Mellitus screening is recommended every year. Glaucoma is an eye disease caused by high pressure in the eye. An eye exam is recommended every year. Cardiovascular screening tests that check your cholesterol and other blood fat (lipid) levels are recommended every five years. Colorectal Cancer screening tests help to find pre-cancerous polyps (growths in the colon) so they can be removed before they turn into cancer. Tests ordered for screening depend on your personal and family history risk factors. Prostate Cancer Screening (annually up to age 76)    Screening for breast cancer is recommended yearly with a Mammogram.    Screening for cervical and vaginal cancer is recommended with a pelvic and Pap test every two years.  However if you have had an abnormal pap in the past  three years or at high risk for cervical or vaginal cancer Medicare will cover a pap test and a pelvic exam every year. Here is a list of your current Health Maintenance items with a due date:  Health Maintenance Due   Topic Date Due    Annual Well Visit  04/19/2018    Glaucoma Screening   08/15/2018            Well Visit, Over 72: Care Instructions  Your Care Instructions    Physical exams can help you stay healthy. Your doctor has checked your overall health and may have suggested ways to take good care of yourself. He or she also may have recommended tests. At home, you can help prevent illness with healthy eating, regular exercise, and other steps. Follow-up care is a key part of your treatment and safety. Be sure to make and go to all appointments, and call your doctor if you are having problems. It's also a good idea to know your test results and keep a list of the medicines you take. How can you care for yourself at home? · Reach and stay at a healthy weight. This will lower your risk for many problems, such as obesity, diabetes, heart disease, and high blood pressure. · Get at least 30 minutes of exercise on most days of the week. Walking is a good choice. You also may want to do other activities, such as running, swimming, cycling, or playing tennis or team sports. · Do not smoke. Smoking can make health problems worse. If you need help quitting, talk to your doctor about stop-smoking programs and medicines. These can increase your chances of quitting for good. · Protect your skin from too much sun. When you're outdoors from 10 a.m. to 4 p.m., stay in the shade or cover up with clothing and a hat with a wide brim. Wear sunglasses that block UV rays. Even when it's cloudy, put broad-spectrum sunscreen (SPF 30 or higher) on any exposed skin. · See a dentist one or two times a year for checkups and to have your teeth cleaned. · Wear a seat belt in the car. · Limit alcohol to 2 drinks a day for men and 1 drink a day for women.  Too much alcohol can cause health problems. Follow your doctor's advice about when to have certain tests. These tests can spot problems early. For men and women  · Cholesterol. Your doctor will tell you how often to have this done based on your overall health and other things that can increase your risk for heart attack and stroke. · Blood pressure. Have your blood pressure checked during a routine doctor visit. Your doctor will tell you how often to check your blood pressure based on your age, your blood pressure results, and other factors. · Diabetes. Ask your doctor whether you should have tests for diabetes. · Vision. Experts recommend that you have yearly exams for glaucoma and other age-related eye problems. · Hearing. Tell your doctor if you notice any change in your hearing. You can have tests to find out how well you hear. · Colon cancer tests. Keep having colon cancer tests as your doctor recommends. You can have one of several types of tests. · Heart attack and stroke risk. At least every 4 to 6 years, you should have your risk for heart attack and stroke assessed. Your doctor uses factors such as your age, blood pressure, cholesterol, and whether you smoke or have diabetes to show what your risk for a heart attack or stroke is over the next 10 years. · Osteoporosis. Talk to your doctor about whether you should have a bone density test to find out whether you have thinning bones. Also ask your doctor about whether you should take calcium and vitamin D supplements. For women  · Pap test and pelvic exam. You may no longer need a Pap test. Talk with your doctor about whether to stop or continue to have Pap tests. · Breast exam and mammogram. Ask how often you should have a mammogram, which is an X-ray of your breasts. A mammogram can spot breast cancer before it can be felt and when it is easiest to treat. · Thyroid disease.  Talk to your doctor about whether to have your thyroid checked as part of a regular physical exam. Women have an increased chance of a thyroid problem. For men  · Prostate exam. Talk to your doctor about whether you should have a blood test (called a PSA test) for prostate cancer. Experts disagree on whether men should have this test. Some experts recommend that you discuss the benefits and risks of the test with your doctor. · Abdominal aortic aneurysm. Ask your doctor whether you should have a test to check for an aneurysm. You may need a test if you ever smoked or if your parent, brother, sister, or child has had an aneurysm. When should you call for help? Watch closely for changes in your health, and be sure to contact your doctor if you have any problems or symptoms that concern you. Where can you learn more? Go to http://ronald-biju.info/. Enter O829 in the search box to learn more about \"Well Visit, Over 65: Care Instructions. \"  Current as of: May 12, 2017  Content Version: 11.4  © 4133-3493 [employer]. Care instructions adapted under license by [facility] (which disclaims liability or warranty for this information). If you have questions about a medical condition or this instruction, always ask your healthcare professional. [place] [address] any warranty or liability for your use of this information.

## 2018-06-15 NOTE — PROGRESS NOTES
This is the Subsequent Medicare Annual Wellness Exam, performed 12 months or more after the Initial AWV or the last Subsequent AWV    I have reviewed the patient's medical history in detail and updated the computerized patient record. History     Past Medical History:   Diagnosis Date    Arthritis     knee    Cancer ([address]     skin    Chronic obstructive pulmonary disease (HCC)     Hypercholesterolemia     Hypertension     Ill-defined condition     lipids    Smoking 1/22/2010    Stopped 04/2000       Past Surgical History:   Procedure Laterality Date    COLORECTAL SCRN; HI RISK IND  1/12/2016         ENDOSCOPY, COLON, DIAGNOSTIC  07/16/2007    Dr Gerardo Valenzuela polyp repeat 07/2010    HX ORTHOPAEDIC Right 3/2016    arthroscopic; torn medial meniscus     Current Outpatient Prescriptions   Medication Sig Dispense Refill    lisinopril (PRINIVIL, ZESTRIL) 20 mg tablet TAKE 1 TABLET DAILY 90 Tab 1    pravastatin (PRAVACHOL) 40 mg tablet TAKE 1 TABLET DAILY 90 Tab 3    tadalafil (CIALIS) 5 mg tablet TAKE 1 TABLET daily 30 Tab 5    aspirin 81 mg chewable tablet Take 1 Tab by mouth daily. 30 Tab 11     Allergies   Allergen Reactions    Macrobid [Nitrofurantoin Monohyd/M-Cryst] Unknown (comments)    Pravastatin Myalgia     Patient states taking pravastatin at this time.     Tetracycline Unknown (comments)     Family History   Problem Relation Age of Onset    Hypertension Mother     Thyroid Disease Mother     Kidney Disease Father     Cancer Father      skin    Diabetes Father     Cancer Paternal Aunt      lung    Cancer Paternal Uncle      skin     Social History   Substance Use Topics    Smoking status: Former Smoker     Packs/day: 2.00     Years: 40.00     Quit date: 4/1/2000    Smokeless tobacco: Never Used    Alcohol use 0.0 oz/week     2 - 3 Cans of beer per week      Comment: Soc     Patient Active Problem List   Diagnosis Code    Hypercholesterolemia E78.00    Hypertension, essential I10    S/P colonoscopy Z98.890    ED (erectile dysfunction) N52.9    Actinic keratosis L57.0    COPD, moderate (HCC) J44.9       Depression Risk Factor Screening:     PHQ over the last two weeks 6/15/2018   Little interest or pleasure in doing things Not at all   Feeling down, depressed or hopeless Not at all   Total Score PHQ 2 0     Alcohol Risk Factor Screenin-2 drinks per day    Functional Ability and Level of Safety:   Hearing Loss  Hearing is good. Activities of Daily Living  The home contains: handrails, grab bars and rugs  Patient does total self care    Fall Risk  Fall Risk Assessment, last 12 mths 6/15/2018   Able to walk? Yes   Fall in past 12 months?  No   Fall with injury? -   Number of falls in past 12 months -   Fall Risk Score -       Abuse Screen  Patient is not abused    Cognitive Screening   Evaluation of Cognitive Function:  Has your family/caregiver stated any concerns about your memory: no  Normal  Three word recall:  Immediate    3/3      Delayed   3/3          Patient Care Team   Patient Care Team:  John Donato MD as PCP - General (Internal Medicine)  Vinayak Pierce (Inactive) (Dermatology)  Paul Degroot MD (Ophthalmology)    Assessment/Plan   Education and counseling provided:  Are appropriate based on today's review and evaluation        Health Maintenance Due   Topic Date Due    MEDICARE YEARLY EXAM  2018    GLAUCOMA SCREENING Q2Y  08/15/2018

## 2018-08-15 DIAGNOSIS — J06.9 UPPER RESPIRATORY TRACT INFECTION, UNSPECIFIED TYPE: ICD-10-CM

## 2018-08-15 RX ORDER — ALBUTEROL SULFATE 90 UG/1
AEROSOL, METERED RESPIRATORY (INHALATION)
Qty: 8.5 INHALER | Refills: 0 | Status: SHIPPED | OUTPATIENT
Start: 2018-08-15 | End: 2018-12-20 | Stop reason: SDUPTHER

## 2018-10-15 ENCOUNTER — CLINICAL SUPPORT (OUTPATIENT)
Dept: INTERNAL MEDICINE CLINIC | Facility: CLINIC | Age: 73
End: 2018-10-15

## 2018-10-15 VITALS
SYSTOLIC BLOOD PRESSURE: 127 MMHG | RESPIRATION RATE: 14 BRPM | WEIGHT: 170 LBS | TEMPERATURE: 98.1 F | DIASTOLIC BLOOD PRESSURE: 86 MMHG | HEIGHT: 70 IN | BODY MASS INDEX: 24.34 KG/M2 | HEART RATE: 69 BPM | OXYGEN SATURATION: 98 %

## 2018-10-15 DIAGNOSIS — Z23 ENCOUNTER FOR IMMUNIZATION: Primary | ICD-10-CM

## 2018-10-15 NOTE — PATIENT INSTRUCTIONS
Vaccine Information Statement    Influenza (Flu) Vaccine (Inactivated or Recombinant): What you need to know    Many Vaccine Information Statements are available in Welsh and other languages. See www.immunize.org/vis  Hojas de Información Sobre Vacunas están disponibles en Español y en muchos otros idiomas. Visite www.immunize.org/vis    1. Why get vaccinated? Influenza (flu) is a contagious disease that spreads around the [place] every year, usually between October and May. Flu is caused by influenza viruses, and is spread mainly by coughing, sneezing, and close contact. Anyone can get flu. Flu strikes suddenly and can last several days. Symptoms vary by age, but can include:   fever/chills   sore throat   muscle aches   fatigue   cough   headache    runny or stuffy nose    Flu can also lead to pneumonia and blood infections, and cause diarrhea and seizures in children. If you have a medical condition, such as heart or lung disease, flu can make it worse. Flu is more dangerous for some people. Infants and young children, people 72years of age and older, pregnant women, and people with certain health conditions or a weakened immune system are at greatest risk. Each year thousands of people in the [facility] die from flu, and many more are hospitalized. Flu vaccine can:   keep you from getting flu,   make flu less severe if you do get it, and   keep you from spreading flu to your family and other people. 2. Inactivated and recombinant flu vaccines    A dose of flu vaccine is recommended every flu season. Children 6 months through 6years of age may need two doses during the same flu season. Everyone else needs only one dose each flu season.        Some inactivated flu vaccines contain a very small amount of a mercury-based preservative called thimerosal. Studies have not shown thimerosal in vaccines to be harmful, but flu vaccines that do not contain thimerosal are available. There is no live flu virus in flu shots. They cannot cause the flu. There are many flu viruses, and they are always changing. Each year a new flu vaccine is made to protect against three or four viruses that are likely to cause disease in the upcoming flu season. But even when the vaccine doesnt exactly match these viruses, it may still provide some protection    Flu vaccine cannot prevent:   flu that is caused by a virus not covered by the vaccine, or   illnesses that look like flu but are not. It takes about 2 weeks for protection to develop after vaccination, and protection lasts through the flu season. 3. Some people should not get this vaccine    Tell the person who is giving you the vaccine:     If you have any severe, life-threatening allergies. If you ever had a life-threatening allergic reaction after a dose of flu vaccine, or have a severe allergy to any part of this vaccine, you may be advised not to get vaccinated. Most, but not all, types of flu vaccine contain a small amount of egg protein.  If you ever had Guillain-Barré Syndrome (also called GBS). Some people with a history of GBS should not get this vaccine. This should be discussed with your doctor.  If you are not feeling well. It is usually okay to get flu vaccine when you have a mild illness, but you might be asked to come back when you feel better. 4. Risks of a vaccine reaction    With any medicine, including vaccines, there is a chance of reactions. These are usually mild and go away on their own, but serious reactions are also possible. Most people who get a flu shot do not have any problems with it.      Minor problems following a flu shot include:    soreness, redness, or swelling where the shot was given     hoarseness   sore, red or itchy eyes   cough   fever   aches   headache   itching   fatigue  If these problems occur, they usually begin soon after the shot and last 1 or 2 days. More serious problems following a flu shot can include the following:     There may be a small increased risk of Guillain-Barré Syndrome (GBS) after inactivated flu vaccine. This risk has been estimated at 1 or 2 additional cases per million people vaccinated. This is much lower than the risk of severe complications from flu, which can be prevented by flu vaccine.  Young children who get the flu shot along with pneumococcal vaccine (PCV13) and/or DTaP vaccine at the same time might be slightly more likely to have a seizure caused by fever. Ask your doctor for more information. Tell your doctor if a child who is getting flu vaccine has ever had a seizure. Problems that could happen after any injected vaccine:      People sometimes faint after a medical procedure, including vaccination. Sitting or lying down for about 15 minutes can help prevent fainting, and injuries caused by a fall. Tell your doctor if you feel dizzy, or have vision changes or ringing in the ears.  Some people get severe pain in the shoulder and have difficulty moving the arm where a shot was given. This happens very rarely.  Any medication can cause a severe allergic reaction. Such reactions from a vaccine are very rare, estimated at about 1 in a million doses, and would happen within a few minutes to a few hours after the vaccination. As with any medicine, there is a very remote chance of a vaccine causing a serious injury or death. The safety of vaccines is always being monitored. For more information, visit: www.cdc.gov/vaccinesafety/    5. What if there is a serious reaction? What should I look for?  Look for anything that concerns you, such as signs of a severe allergic reaction, very high fever, or unusual behavior.     Signs of a severe allergic reaction can include hives, swelling of the face and throat, difficulty breathing, a fast heartbeat, dizziness, and weakness - usually within a few minutes to a few hours after the vaccination. What should I do?  If you think it is a severe allergic reaction or other emergency that cant wait, call 9-1-1 and get the person to the nearest hospital. Otherwise, call your doctor.  Reactions should be reported to the Vaccine Adverse Event Reporting System (VAERS). Your doctor should file this report, or you can do it yourself through  the VAERS web site at www.vaers. [facility].gov, or by calling 0-801.942.8051. VAERS does not give medical advice. 6. The National Vaccine Injury Compensation Program    The formerly [facility] Vaccine Injury Compensation Program (VICP) is a federal program that was created to compensate people who may have been injured by certain vaccines. Persons who believe they may have been injured by a vaccine can learn about the program and about filing a claim by calling 3-881.674.7272 or visiting the [employer] website at www.[facility].gov/vaccinecompensation. There is a time limit to file a claim for compensation. 7. How can I learn more?  Ask your healthcare provider. He or she can give you the vaccine package insert or suggest other sources of information.  Call your local or state health department.  Contact the Centers for Disease Control and Prevention (CDC):  - Call 2-434.953.2880 (1-800-CDC-INFO) or  - Visit CDCs website at www.cdc.gov/flu    Vaccine Information Statement   Inactivated Influenza Vaccine   8/7/2015  42 YOEL Begum [id number]    Department of Health and Human Services  Centers for Disease Control and Prevention    Office Use Only

## 2018-10-15 NOTE — PROGRESS NOTES
After verbal order read back of , patient received High Dose Flu Shot (Adjuvanted Fluad) in left arm. [address] [id number] Lot [id number] Exp 05/31/19 Patient tolerated procedure without complaints and received VIS.

## 2018-12-05 RX ORDER — PRAVASTATIN SODIUM 40 MG/1
TABLET ORAL
Qty: 90 TAB | Refills: 3 | Status: SHIPPED | OUTPATIENT
Start: 2018-12-05 | End: 2020-01-02

## 2018-12-20 ENCOUNTER — OFFICE VISIT (OUTPATIENT)
Dept: INTERNAL MEDICINE CLINIC | Facility: CLINIC | Age: 73
End: 2018-12-20

## 2018-12-20 VITALS
TEMPERATURE: 97.5 F | DIASTOLIC BLOOD PRESSURE: 80 MMHG | BODY MASS INDEX: 24.77 KG/M2 | SYSTOLIC BLOOD PRESSURE: 123 MMHG | WEIGHT: 173 LBS | HEIGHT: 70 IN | RESPIRATION RATE: 12 BRPM | OXYGEN SATURATION: 97 % | HEART RATE: 69 BPM

## 2018-12-20 DIAGNOSIS — I10 HYPERTENSION, ESSENTIAL: Primary | ICD-10-CM

## 2018-12-20 DIAGNOSIS — E78.00 HYPERCHOLESTEROLEMIA: ICD-10-CM

## 2018-12-20 DIAGNOSIS — Z87.891 PERSONAL HISTORY OF TOBACCO USE, PRESENTING HAZARDS TO HEALTH: ICD-10-CM

## 2018-12-20 DIAGNOSIS — J06.9 UPPER RESPIRATORY TRACT INFECTION, UNSPECIFIED TYPE: ICD-10-CM

## 2018-12-20 DIAGNOSIS — Z13.6 SCREENING FOR ABDOMINAL AORTIC ANEURYSM: ICD-10-CM

## 2018-12-20 DIAGNOSIS — J44.9 COPD, MODERATE (HCC): ICD-10-CM

## 2018-12-20 RX ORDER — ALBUTEROL SULFATE 90 UG/1
2 AEROSOL, METERED RESPIRATORY (INHALATION)
Qty: 1 INHALER | Refills: 0 | Status: SHIPPED | OUTPATIENT
Start: 2018-12-20 | End: 2020-08-17

## 2018-12-20 NOTE — PROGRESS NOTES
Keyona Briseno  Identified pt with two pt identifiers(name and ). Chief Complaint   Patient presents with    Blood Pressure Check     Room 1A    Cholesterol Problem    COPD       Reviewed record In preparation for visit and have obtained necessary documentation. Advanced directive / living will on file. 1. Have you been to the ER, urgent care clinic or hospitalized since your last visit? No     2. Have you seen or consulted any other health care providers outside of the [address] [place] [address] since your last visit? Include any pap smears or colon screening. No    Vitals reviewed with provider.     Health Maintenance reviewed:     Health Maintenance Due   Topic    Shingrix Vaccine Age 49> (1 of 2)    AAA Screening 73-69 YO Male Smoking Patients           Wt Readings from Last 3 Encounters:   18 173 lb (78.5 kg)   10/15/18 170 lb (77.1 kg)   06/15/18 170 lb (77.1 kg)        Temp Readings from Last 3 Encounters:   18 97.5 °F (36.4 °C) (Oral)   10/15/18 98.1 °F (36.7 °C) (Oral)   06/15/18 97.6 °F (36.4 °C) (Oral)        BP Readings from Last 3 Encounters:   18 123/80   10/15/18 127/86   06/15/18 139/88        Pulse Readings from Last 3 Encounters:   18 69   10/15/18 69   06/15/18 67        Vitals:    18 0838   BP: 123/80   Pulse: 69   Resp: 12   Temp: 97.5 °F (36.4 °C)   TempSrc: Oral   SpO2: 97%   Weight: 173 lb (78.5 kg)   Height: 5' 10\" (1.778 m)   PainSc:   2   PainLoc: Rib Cage          Learning Assessment:   :       Learning Assessment 2017   PRIMARY LEARNER Patient Patient Patient   HIGHEST LEVEL OF EDUCATION - PRIMARY LEARNER  - - DID NOT GRADUATE HIGH SCHOOL   BARRIERS PRIMARY LEARNER - - NONE   CO-LEARNER CAREGIVER - - No   PRIMARY LANGUAGE ENGLISH ENGLISH ENGLISH   LEARNER PREFERENCE PRIMARY DEMONSTRATION DEMONSTRATION READING   ANSWERED BY patient patient self   RELATIONSHIP SELF SELF SELF        Depression Screening:   :       PHQ over the last two weeks 6/15/2018   Little interest or pleasure in doing things Not at all   Feeling down, depressed, irritable, or hopeless Not at all   Total Score PHQ 2 0        Fall Risk Assessment:   :       Fall Risk Assessment, last 12 mths 6/15/2018   Able to walk? Yes   Fall in past 12 months? No   Fall with injury? -   Number of falls in past 12 months -   Fall Risk Score -        Abuse Screening:   :       Abuse Screening Questionnaire 10/15/2018 10/24/2017 10/11/2016 9/10/2015 9/17/2014   Do you ever feel afraid of your partner? N N N N N   Are you in a relationship with someone who physically or mentally threatens you? N N N N N   Is it safe for you to go home?  Y Y Y Y Y        ADL Screening:   :       ADL Assessment 3/31/2015   Feeding yourself No Help Needed   Getting from bed to chair No Help Needed   Getting dressed No Help Needed   Bathing or showering No Help Needed   Walk across the room (includes cane/walker) No Help Needed   Using the telphone No Help Needed   Taking your medications No Help Needed   Preparing meals No Help Needed   Managing money (expenses/bills) No Help Needed   Moderately strenuous housework (laundry) No Help Needed   Shopping for personal items (toiletries/medicines) No Help Needed   Shopping for groceries No Help Needed   Driving No Help Needed   Climbing a flight of stairs No Help Needed   Getting to places beyond walking distances No Help Needed

## 2018-12-20 NOTE — PATIENT INSTRUCTIONS
Office visit in 6 months with fasting lab work a week before visit. DASH Diet: Care Instructions  Your Care Instructions    The DASH diet is an eating plan that can help lower your blood pressure. DASH stands for Dietary Approaches to Stop Hypertension. Hypertension is high blood pressure. The DASH diet focuses on eating foods that are high in calcium, potassium, and magnesium. These nutrients can lower blood pressure. The foods that are highest in these nutrients are fruits, vegetables, low-fat dairy products, nuts, seeds, and legumes. But taking calcium, potassium, and magnesium supplements instead of eating foods that are high in those nutrients does not have the same effect. The DASH diet also includes whole grains, fish, and poultry. The DASH diet is one of several lifestyle changes your doctor may recommend to lower your high blood pressure. Your doctor may also want you to decrease the amount of sodium in your diet. Lowering sodium while following the DASH diet can lower blood pressure even further than just the DASH diet alone. Follow-up care is a key part of your treatment and safety. Be sure to make and go to all appointments, and call your doctor if you are having problems. It's also a good idea to know your test results and keep a list of the medicines you take. How can you care for yourself at home? Following the DASH diet  · Eat 4 to 5 servings of fruit each day. A serving is 1 medium-sized piece of fruit, ½ cup chopped or canned fruit, 1/4 cup dried fruit, or 4 ounces (½ cup) of fruit juice. Choose fruit more often than fruit juice. · Eat 4 to 5 servings of vegetables each day. A serving is 1 cup of lettuce or raw leafy vegetables, ½ cup of chopped or cooked vegetables, or 4 ounces (½ cup) of vegetable juice. Choose vegetables more often than vegetable juice. · Get 2 to 3 servings of low-fat and fat-free dairy each day.  A serving is 8 ounces of milk, 1 cup of yogurt, or 1 ½ ounces of cheese. · Eat 6 to 8 servings of grains each day. A serving is 1 slice of bread, 1 ounce of dry cereal, or ½ cup of cooked rice, pasta, or cooked cereal. Try to choose whole-grain products as much as possible. · Limit lean meat, poultry, and fish to 2 servings each day. A serving is 3 ounces, about the size of a deck of cards. · Eat 4 to 5 servings of nuts, seeds, and legumes (cooked dried beans, lentils, and split peas) each week. A serving is 1/3 cup of nuts, 2 tablespoons of seeds, or ½ cup of cooked beans or peas. · Limit fats and oils to 2 to 3 servings each day. A serving is 1 teaspoon of vegetable oil or 2 tablespoons of salad dressing. · Limit sweets and added sugars to 5 servings or less a week. A serving is 1 tablespoon jelly or jam, ½ cup sorbet, or 1 cup of lemonade. · Eat less than 2,300 milligrams (mg) of sodium a day. If you limit your sodium to 1,500 mg a day, you can lower your blood pressure even more. Tips for success  · Start small. Do not try to make dramatic changes to your diet all at once. You might feel that you are missing out on your favorite foods and then be more likely to not follow the plan. Make small changes, and stick with them. Once those changes become habit, add a few more changes. · Try some of the following:  ? Make it a goal to eat a fruit or vegetable at every meal and at snacks. This will make it easy to get the recommended amount of fruits and vegetables each day. ? Try yogurt topped with fruit and nuts for a snack or healthy dessert. ? Add lettuce, tomato, cucumber, and onion to sandwiches. ? Combine a ready-made pizza crust with low-fat mozzarella cheese and lots of vegetable toppings. Try using tomatoes, squash, spinach, broccoli, carrots, cauliflower, and onions. ? Have a variety of cut-up vegetables with a low-fat dip as an appetizer instead of chips and dip. ? Sprinkle sunflower seeds or chopped almonds over salads.  Or try adding chopped walnuts or almonds to cooked vegetables. ? Try some vegetarian meals using beans and peas. Add garbanzo or kidney beans to salads. Make burritos and tacos with mashed larsen beans or black beans. Where can you learn more? Go to http://ronald-biju.info/. Enter D008 in the search box to learn more about \"DASH Diet: Care Instructions. \"  Current as of: December 6, 2017  Content Version: 11.8  © 3303-9208 [employer]. Care instructions adapted under license by [employer] (which disclaims liability or warranty for this information). If you have questions about a medical condition or this instruction, always ask your healthcare professional. [address] any warranty or liability for your use of this information.

## 2018-12-20 NOTE — PROGRESS NOTES
HISTORY OF PRESENT ILLNESS  Kelsie Diane is a 68 y.o. male. HPI  He presents for follow up of hypertension and hyperlipidemia. Diet and Lifestyle: generally follows a low fat low cholesterol diet, generally follows a low sodium diet, exercises sporadically, nonsmoker  Medication compliance: compliant all of the time  Medication side effects: none  Home BP Monitoring: not done  Cardiovascular ROS:  He denies palpitations, orthopnea, exertional chest pressure/discomfort, claudication, lower extremity edema, dyspnea on exertion, dizziness     He is being seen for follow up of COPD. He is taking medications as instructed, no medication side effects noted, no significant ongoing wheezing or shortness of breath, using bronchodilator MDI less than twice a week.    Uses rescue inhaler:0  times /week  Smoking:former smoker:  80 pack years, quit in 1997      Patient Active Problem List   Diagnosis Code    Hypercholesterolemia E78.00    Hypertension, essential I10    S/P colonoscopy Z98.890    ED (erectile dysfunction) N52.9    Actinic keratosis L57.0    COPD, moderate ([facility] [address] J44.9     Past Medical History:   Diagnosis Date    Arthritis     knee    Cancer ([facility] [address]     skin    Chronic obstructive pulmonary disease ([facility] [address]     Hypercholesterolemia     Hypertension     Ill-defined condition     lipids    Smoking 1/22/2010    Stopped 04/2000      Past Surgical History:   Procedure Laterality Date    COLORECTAL SCRN; HI RISK IND  1/12/2016         ENDOSCOPY, COLON, DIAGNOSTIC  07/16/2007    Dr Liza Faye polyp repeat 07/2010    HX ORTHOPAEDIC Right 3/2016    arthroscopic; torn medial meniscus     Social History     Socioeconomic History    Marital status:      Spouse name: Not on file    Number of children: 3    Years of education: Not on file    Highest education level: Not on file   Occupational History    Occupation: construction   Tobacco Use    Smoking status: Former Smoker     Packs/day: 2.00 Years: 40.00     Pack years: 80.00     Last attempt to quit: 2000     Years since quittin.7    Smokeless tobacco: Never Used   Substance and Sexual Activity    Alcohol use: Yes     Alcohol/week: 0.0 oz     Types: 2 - 3 Cans of beer per week     Comment: Soc    Drug use: No     Family History   Problem Relation Age of Onset    Hypertension Mother     Thyroid Disease Mother     Kidney Disease Father     Cancer Father         skin    Diabetes Father     Cancer Paternal Aunt         lung    Cancer Paternal Uncle         skin     Allergies   Allergen Reactions    Macrobid [Nitrofurantoin Monohyd/M-Cryst] Unknown (comments)    Tetracycline Unknown (comments)     Current Outpatient Medications   Medication Sig Dispense Refill    lisinopril (PRINIVIL, ZESTRIL) 20 mg tablet TAKE 1 TABLET DAILY 90 Tab 3    pravastatin (PRAVACHOL) 40 mg tablet TAKE 1 TABLET DAILY 90 Tab 3    PROAIR HFA 90 mcg/actuation inhaler TAKE 2 PUFFS BY INHALATION EVERY FOUR (4) HOURS AS NEEDED FOR WHEEZING. 8.5 Inhaler 0    tadalafil (CIALIS) 5 mg tablet TAKE 1 TABLET daily 30 Tab 5    aspirin 81 mg chewable tablet Take 1 Tab by mouth daily. 30 Tab 11       Review of Systems   Constitutional: Negative for malaise/fatigue and weight loss. Gastrointestinal: Negative for constipation and diarrhea. Musculoskeletal: Negative for back pain and joint pain. Neurological: Negative for tingling and focal weakness. Visit Vitals  /80 (BP 1 Location: Left arm, BP Patient Position: Sitting)   Pulse 69   Temp 97.5 °F (36.4 °C) (Oral)   Resp 12   Ht 5' 10\" (1.778 m)   Wt 173 lb (78.5 kg)   SpO2 97%   BMI 24.82 kg/m²     Physical Exam   Constitutional: He is oriented to person, place, and time. He appears well-developed and well-nourished. HENT:   Head: Normocephalic and atraumatic. Eyes: Conjunctivae are normal. Pupils are equal, round, and reactive to light. Neck: Neck supple. Carotid bruit is not present.  No thyromegaly present. Cardiovascular: Normal rate, regular rhythm and normal heart sounds. PMI is not displaced. Exam reveals no gallop. No murmur heard. Pulses:       Dorsalis pedis pulses are 2+ on the right side, and 2+ on the left side. Posterior tibial pulses are 2+ on the right side, and 2+ on the left side. Pulmonary/Chest: Effort normal. He has no wheezes. He has no rhonchi. He has no rales. Abdominal: Soft. Normal appearance. He exhibits no abdominal bruit and no mass. There is no hepatosplenomegaly. There is no tenderness. Musculoskeletal: He exhibits no edema. Lymphadenopathy:     He has no cervical adenopathy. Right: No supraclavicular adenopathy present. Left: No supraclavicular adenopathy present. Neurological: He is alert and oriented to person, place, and time. No sensory deficit. Skin: Skin is warm, dry and intact. No rash noted. Psychiatric: He has a normal mood and affect. His behavior is normal.   Nursing note and vitals reviewed. Lab Results   Component Value Date/Time    Cholesterol, total 144 04/24/2018 08:36 AM    HDL Cholesterol 49 04/24/2018 08:36 AM    LDL, calculated 76 04/24/2018 08:36 AM    VLDL, calculated 19 04/24/2018 08:36 AM    Triglyceride 97 04/24/2018 08:36 AM    CHOL/HDL Ratio 4.7 08/16/2010 08:57 AM     Lab Results   Component Value Date/Time    Sodium 145 (H) 04/24/2018 08:36 AM    Potassium 4.8 04/24/2018 08:36 AM    Chloride 105 04/24/2018 08:36 AM    CO2 23 04/24/2018 08:36 AM    Anion gap 7 12/21/2017 02:35 AM    Glucose 87 04/24/2018 08:36 AM    BUN 14 04/24/2018 08:36 AM    Creatinine 1.03 04/24/2018 08:36 AM    BUN/Creatinine ratio 14 04/24/2018 08:36 AM    GFR est AA 84 04/24/2018 08:36 AM    GFR est non-AA 72 04/24/2018 08:36 AM    Calcium 9.5 04/24/2018 08:36 AM    Bilirubin, total 0.4 04/24/2018 08:36 AM    AST (SGOT) 22 04/24/2018 08:36 AM    Alk.  phosphatase 110 04/24/2018 08:36 AM    Protein, total 6.7 04/24/2018 08:36 AM    Albumin 4.1 04/24/2018 08:36 AM    Globulin 4.3 (H) 12/21/2017 02:35 AM    A-G Ratio 1.6 04/24/2018 08:36 AM    ALT (SGPT) 17 04/24/2018 08:36 AM       ASSESSMENT and PLAN    ICD-10-CM ICD-9-CM    1. Hypertension, essential I10 401.9    2. Hypercholesterolemia E78.00 272.0 LIPID PANEL      METABOLIC PANEL, COMPREHENSIVE   3. COPD, moderate ([address] J44.9 496 albuterol (PROVENTIL HFA, VENTOLIN HFA, PROAIR HFA) 90 mcg/actuation inhaler   4. Screening for abdominal aortic aneurysm Z13.6 V81.2    5. Upper respiratory tract infection, unspecified type J06.9 465.9    6. Personal history of tobacco use, presenting hazards to health G62.018 V15.82      Diagnoses and all orders for this visit:    1. Hypertension, essential  Hypertension is controlled    2. Hypercholesterolemia  Hyperlipidemia is controlled  -     LIPID PANEL; Future  -     METABOLIC PANEL, COMPREHENSIVE; Future    3. COPD, moderate (HCC)  -     albuterol (PROVENTIL HFA, VENTOLIN HFA, PROAIR HFA) 90 mcg/actuation inhaler; Take 2 Puffs by inhalation every four (4) hours as needed for Wheezing. 4. Screening for abdominal aortic aneurysm    5. Upper respiratory tract infection, unspecified type    6. Personal history of tobacco use, presenting hazards to health      Follow-up Disposition:  Return in about 6 months (around 6/20/2019), or   Fasting lab one week pr, for HTN, chol, COPD   Fasting lab one week prior . lab results and schedule of future lab studies reviewed with patient  reviewed diet, exercise and weight control  I have discussed the diagnosis, evaluation and treatment options and the intended plan with the patient. Patient understands and is in agreement. The patient has received an after-visit summary and questions were answered concerning future plans. I have discussed side effects and warnings of any new medications with the patient as well.

## 2019-01-17 ENCOUNTER — HOSPITAL ENCOUNTER (OUTPATIENT)
Dept: ULTRASOUND IMAGING | Age: 74
Discharge: HOME | End: 2019-01-17
Attending: INTERNAL MEDICINE
Payer: MEDICARE

## 2019-01-17 DIAGNOSIS — Z87.891 PERSONAL HISTORY OF TOBACCO USE, PRESENTING HAZARDS TO HEALTH: ICD-10-CM

## 2019-01-17 DIAGNOSIS — Z13.6 SCREENING FOR ABDOMINAL AORTIC ANEURYSM: ICD-10-CM

## 2019-01-17 PROCEDURE — 76706 US ABDL AORTA SCREEN AAA: CPT

## 2019-03-05 ENCOUNTER — OFFICE VISIT (OUTPATIENT)
Dept: INTERNAL MEDICINE CLINIC | Facility: CLINIC | Age: 74
End: 2019-03-05

## 2019-03-05 VITALS
DIASTOLIC BLOOD PRESSURE: 80 MMHG | BODY MASS INDEX: 24.48 KG/M2 | HEIGHT: 70 IN | TEMPERATURE: 97.6 F | OXYGEN SATURATION: 96 % | WEIGHT: 171 LBS | HEART RATE: 67 BPM | RESPIRATION RATE: 16 BRPM | SYSTOLIC BLOOD PRESSURE: 132 MMHG

## 2019-03-05 DIAGNOSIS — S13.9XXA NECK SPRAIN, INITIAL ENCOUNTER: ICD-10-CM

## 2019-03-05 DIAGNOSIS — J20.9 ACUTE BRONCHITIS, UNSPECIFIED ORGANISM: Primary | ICD-10-CM

## 2019-03-05 DIAGNOSIS — Z13.31 SCREENING FOR DEPRESSION: ICD-10-CM

## 2019-03-05 RX ORDER — METHOCARBAMOL 500 MG/1
TABLET, FILM COATED ORAL
Refills: 0 | COMMUNITY
Start: 2019-02-26 | End: 2019-08-26

## 2019-03-05 NOTE — PATIENT INSTRUCTIONS
Bronchitis: Care Instructions  Your Care Instructions    Bronchitis is inflammation of the bronchial tubes, which carry air to the lungs. The tubes swell and produce mucus, or phlegm. The mucus and inflamed bronchial tubes make you cough. You may have trouble breathing. Most cases of bronchitis are caused by viruses like those that cause colds. Antibiotics usually do not help and they may be harmful. Bronchitis usually develops rapidly and lasts about 2 to 3 weeks in otherwise healthy people. Follow-up care is a key part of your treatment and safety. Be sure to make and go to all appointments, and call your doctor if you are having problems. It's also a good idea to know your test results and keep a list of the medicines you take. How can you care for yourself at home? · Take all medicines exactly as prescribed. Call your doctor if you think you are having a problem with your medicine. · Get some extra rest.  · Take an over-the-counter pain medicine, such as acetaminophen (Tylenol), ibuprofen (Advil, Motrin), or naproxen (Aleve) to reduce fever and relieve body aches. Read and follow all instructions on the label. · Do not take two or more pain medicines at the same time unless the doctor told you to. Many pain medicines have acetaminophen, which is Tylenol. Too much acetaminophen (Tylenol) can be harmful. · Take an over-the-counter cough medicine that contains dextromethorphan to help quiet a dry, hacking cough so that you can sleep. Avoid cough medicines that have more than one active ingredient. Read and follow all instructions on the label. · Breathe moist air from a humidifier, hot shower, or sink filled with hot water. The heat and moisture will thin mucus so you can cough it out. · Do not smoke. Smoking can make bronchitis worse. If you need help quitting, talk to your doctor about stop-smoking programs and medicines. These can increase your chances of quitting for good.   When should you call for help? Call 911 anytime you think you may need emergency care. For example, call if:    · You have severe trouble breathing.    Call your doctor now or seek immediate medical care if:    · You have new or worse trouble breathing.     · You cough up dark brown or bloody mucus (sputum).     · You have a new or higher fever.     · You have a new rash.    Watch closely for changes in your health, and be sure to contact your doctor if:    · You cough more deeply or more often, especially if you notice more mucus or a change in the color of your mucus.     · You are not getting better as expected. Where can you learn more? Go to http://ronald-biju.info/. Enter H333 in the search box to learn more about \"Bronchitis: Care Instructions. \"  Current as of: September 5, 2018  Content Version: 11.9  © 3210-5547 [employer]. Care instructions adapted under license by [employer] (which disclaims liability or warranty for this information). If you have questions about a medical condition or this instruction, always ask your healthcare professional. [place] [address] any warranty or liability for your use of this information. Neck Strain or Sprain: Rehab Exercises  Your Care Instructions  Here are some examples of typical rehabilitation exercises for your condition. Start each exercise slowly. Ease off the exercise if you start to have pain. Your doctor or physical therapist will tell you when you can start these exercises and which ones will work best for you. How to do the exercises  Neck rotation    1. Sit in a firm chair, or stand up straight. 2. Keeping your chin level, turn your head to the right, and hold for 15 to 30 seconds. 3. Turn your head to the left and hold for 15 to 30 seconds. 4. Repeat 2 to 4 times to each side. Neck stretches    1. Look straight ahead, and tip your right ear to your right shoulder.  Do not let your left shoulder rise up as you tip your head to the right. 2. Hold for 15 to 30 seconds. 3. Tilt your head to the left. Do not let your right shoulder rise up as you tip your head to the left. 4. Hold for 15 to 30 seconds. 5. Repeat 2 to 4 times to each side. Forward neck flexion    1. Sit in a firm chair, or stand up straight. 2. Bend your head forward. 3. Hold for 15 to 30 seconds. 4. Repeat 2 to 4 times. Lateral (side) bend strengthening    1. With your right hand, place your first two fingers on your right temple. 2. Start to bend your head to the side while using gentle pressure from your fingers to keep your head from bending. 3. Hold for about 6 seconds. 4. Repeat 8 to 12 times. 5. Switch hands and repeat the same exercise on your left side. Forward bend strengthening    1. Place your first two fingers of either hand on your forehead. 2. Start to bend your head forward while using gentle pressure from your fingers to keep your head from bending. 3. Hold for about 6 seconds. 4. Repeat 8 to 12 times. Neutral position strengthening    1. Using one hand, place your fingertips on the back of your head at the top of your neck. 2. Start to bend your head backward while using gentle pressure from your fingers to keep your head from bending. 3. Hold for about 6 seconds. 4. Repeat 8 to 12 times. Chin tuck    1. Lie on the floor with a rolled-up towel under your neck. Your head should be touching the floor. 2. Slowly bring your chin toward your chest.  3. Hold for a count of 6, and then relax for up to 10 seconds. 4. Repeat 8 to 12 times. Follow-up care is a key part of your treatment and safety. Be sure to make and go to all appointments, and call your doctor if you are having problems. It's also a good idea to know your test results and keep a list of the medicines you take. Where can you learn more? Go to http://ronald-biju.info/.   Enter M679 in the search box to learn more about \"Neck Strain or Sprain: Rehab Exercises. \"  Current as of: September 20, 2018  Content Version: 11.9  © 7008-2483 [employer]. Care instructions adapted under license by [employer] (which disclaims liability or warranty for this information). If you have questions about a medical condition or this instruction, always ask your healthcare professional. [address] any warranty or liability for your use of this information.

## 2019-03-05 NOTE — PROGRESS NOTES
Anay Allen  Identified pt with two pt identifiers(name and ). Chief Complaint   Patient presents with    Cold Symptoms     1A; wheezing,cough, mucus, started last tuesday    Neck Pain       Reviewed record In preparation for visit and have obtained necessary documentation. Advanced directive / living will on file. 1. Have you been to the ER, urgent care clinic or hospitalized since your last visit? UC last Tuesday neck pain    2. Have you seen or consulted any other health care providers outside of the [address] [place] [address] since your last visit? Include any pap smears or colon screening. No    Vitals reviewed with provider.     Health Maintenance reviewed:     Health Maintenance Due   Topic    Shingrix Vaccine Age 50> (1 of 2)          Wt Readings from Last 3 Encounters:   19 171 lb (77.6 kg)   18 173 lb (78.5 kg)   10/15/18 170 lb (77.1 kg)        Temp Readings from Last 3 Encounters:   19 97.6 °F (36.4 °C) (Oral)   18 97.5 °F (36.4 °C) (Oral)   10/15/18 98.1 °F (36.7 °C) (Oral)        BP Readings from Last 3 Encounters:   19 132/80   18 123/80   10/15/18 127/86        Pulse Readings from Last 3 Encounters:   19 67   18 69   10/15/18 69        Vitals:    19 1313 19 1315   BP: (!) 152/95 132/80   Pulse: 67    Resp: 16    Temp: 97.6 °F (36.4 °C)    TempSrc: Oral    SpO2: 96%    Weight: 171 lb (77.6 kg)    Height: 5' 10\" (1.778 m)    PainSc:   2    PainLoc: Neck           Learning Assessment:   :       Learning Assessment 2017   PRIMARY LEARNER Patient Patient Patient   HIGHEST LEVEL OF EDUCATION - PRIMARY LEARNER  - - DID NOT GRADUATE HIGH SCHOOL   BARRIERS PRIMARY LEARNER - - NONE   CO-LEARNER CAREGIVER - - No   PRIMARY LANGUAGE ENGLISH ENGLISH ENGLISH   LEARNER PREFERENCE PRIMARY DEMONSTRATION DEMONSTRATION READING   ANSWERED BY patient patient self   RELATIONSHIP SELF SELF SELF        Depression Screening:   : 3 most recent PHQ Screens 3/5/2019   Little interest or pleasure in doing things Not at all   Feeling down, depressed, irritable, or hopeless Not at all   Total Score PHQ 2 0        Fall Risk Assessment:   :       Fall Risk Assessment, last 12 mths 6/15/2018   Able to walk? Yes   Fall in past 12 months? No   Fall with injury? -   Number of falls in past 12 months -   Fall Risk Score -        Abuse Screening:   :       Abuse Screening Questionnaire 10/15/2018 10/24/2017 10/11/2016 9/10/2015 9/17/2014   Do you ever feel afraid of your partner? N N N N N   Are you in a relationship with someone who physically or mentally threatens you? N N N N N   Is it safe for you to go home?  Y Y Y Y Y        ADL Screening:   :       ADL Assessment 3/31/2015   Feeding yourself No Help Needed   Getting from bed to chair No Help Needed   Getting dressed No Help Needed   Bathing or showering No Help Needed   Walk across the room (includes cane/walker) No Help Needed   Using the telphone No Help Needed   Taking your medications No Help Needed   Preparing meals No Help Needed   Managing money (expenses/bills) No Help Needed   Moderately strenuous housework (laundry) No Help Needed   Shopping for personal items (toiletries/medicines) No Help Needed   Shopping for groceries No Help Needed   Driving No Help Needed   Climbing a flight of stairs No Help Needed   Getting to places beyond walking distances No Help Needed

## 2019-08-16 DIAGNOSIS — E78.00 HYPERCHOLESTEROLEMIA: ICD-10-CM

## 2019-08-20 LAB
ALBUMIN SERPL-MCNC: 4 G/DL (ref 3.5–4.8)
ALBUMIN/GLOB SERPL: 1.7 {RATIO} (ref 1.2–2.2)
ALP SERPL-CCNC: 113 IU/L (ref 39–117)
ALT SERPL-CCNC: 17 IU/L (ref 0–44)
AST SERPL-CCNC: 21 IU/L (ref 0–40)
BILIRUB SERPL-MCNC: 0.5 MG/DL (ref 0–1.2)
BUN SERPL-MCNC: 12 MG/DL (ref 8–27)
BUN/CREAT SERPL: 12 (ref 10–24)
CALCIUM SERPL-MCNC: 9.3 MG/DL (ref 8.6–10.2)
CHLORIDE SERPL-SCNC: 107 MMOL/L (ref 96–106)
CHOLEST SERPL-MCNC: 142 MG/DL (ref 100–199)
CO2 SERPL-SCNC: 24 MMOL/L (ref 20–29)
CREAT SERPL-MCNC: 1.04 MG/DL (ref 0.76–1.27)
GLOBULIN SER CALC-MCNC: 2.4 G/DL (ref 1.5–4.5)
GLUCOSE SERPL-MCNC: 94 MG/DL (ref 65–99)
HDLC SERPL-MCNC: 45 MG/DL
LDLC SERPL CALC-MCNC: 82 MG/DL (ref 0–99)
POTASSIUM SERPL-SCNC: 4.2 MMOL/L (ref 3.5–5.2)
PROT SERPL-MCNC: 6.4 G/DL (ref 6–8.5)
SODIUM SERPL-SCNC: 144 MMOL/L (ref 134–144)
TRIGL SERPL-MCNC: 77 MG/DL (ref 0–149)
VLDLC SERPL CALC-MCNC: 15 MG/DL (ref 5–40)

## 2019-08-22 RX ORDER — TADALAFIL 5 MG/1
TABLET ORAL
Qty: 30 TAB | Refills: 5 | Status: SHIPPED | OUTPATIENT
Start: 2019-08-22 | End: 2020-02-24 | Stop reason: SDUPTHER

## 2019-08-22 NOTE — TELEPHONE ENCOUNTER
PCP: Norman Avitia MD     Last appt: 8/19/2019   Future Appointments   Date Time Provider Bertrand Martini   8/26/2019  8:45 AM Norman Avitia  [facility]        Requested Prescriptions     Pending Prescriptions Disp Refills    tadalafil (CIALIS) 5 mg tablet 30 Tab 5     Sig: TAKE 1 TABLET daily

## 2019-08-25 NOTE — PROGRESS NOTES
This is the Subsequent Medicare Annual Wellness Exam, performed 12 months or more after the Initial AWV or the last Subsequent AWV    I have reviewed the patient's medical history in detail and updated the computerized patient record. History     Past Medical History:   Diagnosis Date    Arthritis     knee    Cancer ([facility] [address]     skin    Chronic obstructive pulmonary disease ([facility] [address]     Hypercholesterolemia     Hypertension     Ill-defined condition     lipids    Smoking 2010    Stopped 2000       Past Surgical History:   Procedure Laterality Date    COLORECTAL SCRN; HI RISK IND  2016         ENDOSCOPY, COLON, DIAGNOSTIC  2007    Dr Oriana Lane polyp repeat 2010    HX ORTHOPAEDIC Right 3/2016    arthroscopic; torn medial meniscus     Current Outpatient Medications   Medication Sig Dispense Refill    tadalafil (CIALIS) 5 mg tablet TAKE 1 TABLET daily 30 Tab 5    albuterol (PROVENTIL HFA, VENTOLIN HFA, PROAIR HFA) 90 mcg/actuation inhaler Take 2 Puffs by inhalation every four (4) hours as needed for Wheezing. 1 Inhaler 0    lisinopril (PRINIVIL, ZESTRIL) 20 mg tablet TAKE 1 TABLET DAILY 90 Tab 3    pravastatin (PRAVACHOL) 40 mg tablet TAKE 1 TABLET DAILY 90 Tab 3    aspirin 81 mg chewable tablet Take 1 Tab by mouth daily.  30 Tab 11     Allergies   Allergen Reactions    Macrobid [Nitrofurantoin Monohyd/M-Cryst] Unknown (comments)    Tetracycline Unknown (comments)     Family History   Problem Relation Age of Onset    Hypertension Mother     Thyroid Disease Mother     Kidney Disease Father     Cancer Father         skin    Diabetes Father     Cancer Paternal Aunt         lung    Cancer Paternal Uncle         skin     Social History     Tobacco Use    Smoking status: Former Smoker     Packs/day: 2.00     Years: 40.00     Pack years: 80.00     Last attempt to quit: 2000     Years since quittin.4    Smokeless tobacco: Never Used   Substance Use Topics    Alcohol use: Yes     Alcohol/week: 0.0 standard drinks     Types: 2 - 3 Cans of beer per week     Comment: Soc     Patient Active Problem List   Diagnosis Code    Hypercholesterolemia E78.00    Hypertension, essential I10    S/P colonoscopy F48.884    ED (erectile dysfunction) N52.9    Actinic keratosis L57.0    COPD, moderate ([address] J44.9       Depression Risk Factor Screening:     3 most recent PHQ Screens 3/5/2019   Little interest or pleasure in doing things Not at all   Feeling down, depressed, irritable, or hopeless Not at all   Total Score PHQ 2 0     Alcohol Risk Factor Screenin-2 beers per week. Functional Ability and Level of Safety:   Hearing Loss  Hearing is good. Activities of Daily Living  The home contains: handrails  Patient does total self care    Fall Risk  Fall Risk Assessment, last 12 mths 2019   Able to walk? Yes   Fall in past 12 months?  No   Fall with injury? -   Number of falls in past 12 months -   Fall Risk Score -       Abuse Screen  Patient is not abused    Cognitive Screening   Evaluation of Cognitive Function:  Has your family/caregiver stated any concerns about your memory: no  Normal    Patient Care Team   Patient Care Team:  Bean Quiroga MD as PCP - General (Internal Medicine)  Zoraida Benjamin (Inactive) (Dermatology)  Tera Maria MD (Ophthalmology)    Assessment/Plan   Education and counseling provided:  Are appropriate based on today's review and evaluation  Influenza Vaccine        Health Maintenance Due   Topic Date Due    Shingrix Vaccine Age 49> (1 of 2) 1995    MEDICARE YEARLY EXAM  2019    Influenza Age 9 to Adult  2019

## 2019-08-25 NOTE — PROGRESS NOTES
HISTORY OF PRESENT ILLNESS  Frank Ruffin is a 76 y.o. male. HPI  He presents for follow up of hypertension and hyperlipidemia. Diet and Lifestyle: generally follows a low fat low cholesterol diet, generally follows a low sodium diet, exercises regularly, nonsmoker  Medication compliance: compliant all of the time  Medication side effects: none  Home BP Monitoring: not done  Cardiovascular ROS:  He complains of dizziness. About every 2 weeks, usually is extends neck    He denies palpitations, orthopnea, exertional chest pressure/discomfort, claudication, lower extremity edema, dyspnea on exertion     He is being seen for follow up of COPD. He is taking medications as instructed, no medication side effects noted, no significant ongoing wheezing or shortness of breath, using bronchodilator MDI less than twice a week.    Uses rescue inhaler:0  times /week      Patient Active Problem List   Diagnosis Code    Hypercholesterolemia E78.00    Hypertension, essential I10    S/P colonoscopy Q34.435    ED (erectile dysfunction) N52.9    Actinic keratosis L57.0    COPD, moderate ([address] J44.9     Past Medical History:   Diagnosis Date    Arthritis     knee    Cancer ([facility] [address]     skin    Chronic obstructive pulmonary disease ([facility] [address]     Hypercholesterolemia     Hypertension     Ill-defined condition     lipids    Smoking 1/22/2010    Stopped 04/2000      Past Surgical History:   Procedure Laterality Date    COLORECTAL SCRN; HI RISK IND  1/12/2016         ENDOSCOPY, COLON, DIAGNOSTIC  07/16/2007    Dr Pinky Gonsales polyp repeat 07/2010    HX ORTHOPAEDIC Right 3/2016    arthroscopic; torn medial meniscus     Social History     Socioeconomic History    Marital status:      Spouse name: Not on file    Number of children: 3    Years of education: Not on file    Highest education level: Not on file   Occupational History    Occupation: construction   Tobacco Use    Smoking status: Former Smoker     Packs/day: 2.00 Years: 40.00     Pack years: 80.00     Last attempt to quit: 2000     Years since quittin.4    Smokeless tobacco: Never Used   Substance and Sexual Activity    Alcohol use: Yes     Alcohol/week: 0.0 standard drinks     Types: 2 - 3 Cans of beer per week     Comment: Soc    Drug use: No     Family History   Problem Relation Age of Onset    Hypertension Mother     Thyroid Disease Mother     Kidney Disease Father     Cancer Father         skin    Diabetes Father     Cancer Paternal Aunt         lung    Cancer Paternal Uncle         skin     Allergies   Allergen Reactions    Macrobid [Nitrofurantoin Monohyd/M-Cryst] Unknown (comments)    Tetracycline Unknown (comments)     Current Outpatient Medications   Medication Sig Dispense Refill    tadalafil (CIALIS) 5 mg tablet TAKE 1 TABLET daily 30 Tab 5    albuterol (PROVENTIL HFA, VENTOLIN HFA, PROAIR HFA) 90 mcg/actuation inhaler Take 2 Puffs by inhalation every four (4) hours as needed for Wheezing. 1 Inhaler 0    lisinopril (PRINIVIL, ZESTRIL) 20 mg tablet TAKE 1 TABLET DAILY 90 Tab 3    pravastatin (PRAVACHOL) 40 mg tablet TAKE 1 TABLET DAILY 90 Tab 3    aspirin 81 mg chewable tablet Take 1 Tab by mouth daily. 30 Tab 11         Review of Systems   Constitutional: Negative for malaise/fatigue and weight loss. Gastrointestinal: Positive for diarrhea (occasionally). Negative for constipation. Musculoskeletal: Positive for joint pain. Negative for back pain. Neurological: Positive for tingling (left uper arm after palying guitar). Negative for focal weakness. Psychiatric/Behavioral: Suicidal ideas: left shoulder that is less when he is active. Visit Vitals  /86 (BP 1 Location: Left arm, BP Patient Position: Sitting)   Pulse 68   Temp 97.7 °F (36.5 °C) (Oral)   Resp 12   Ht 5' 10\" (1.778 m)   Wt 167 lb (75.8 kg)   SpO2 95%   BMI 23.96 kg/m²     Physical Exam   Constitutional: He is oriented to person, place, and time.  He appears well-developed and well-nourished. HENT:   Head: Normocephalic and atraumatic. Eyes: Pupils are equal, round, and reactive to light. Conjunctivae are normal.   Neck: Neck supple. Carotid bruit is not present. No thyromegaly present. Cardiovascular: Normal rate, regular rhythm and normal heart sounds. PMI is not displaced. Exam reveals no gallop. No murmur heard. Pulses:       Dorsalis pedis pulses are 2+ on the right side, and 2+ on the left side. Posterior tibial pulses are 2+ on the right side, and 2+ on the left side. Pulmonary/Chest: Effort normal. He has no wheezes. He has no rhonchi. He has no rales. Abdominal: Soft. Normal appearance. He exhibits no abdominal bruit and no mass. There is no hepatosplenomegaly. There is no tenderness. Musculoskeletal: He exhibits no edema. Lymphadenopathy:     He has no cervical adenopathy. Right: No supraclavicular adenopathy present. Left: No supraclavicular adenopathy present. Neurological: He is alert and oriented to person, place, and time. No sensory deficit. Skin: Skin is warm, dry and intact. No rash noted. Psychiatric: He has a normal mood and affect. His behavior is normal.   Nursing note and vitals reviewed.     Results for orders placed or performed in visit on 08/16/19   LIPID PANEL   Result Value Ref Range    Cholesterol, total 142 100 - 199 mg/dL    Triglyceride 77 0 - 149 mg/dL    HDL Cholesterol 45 >39 mg/dL    VLDL, calculated 15 5 - 40 mg/dL    LDL, calculated 82 0 - 99 mg/dL   METABOLIC PANEL, COMPREHENSIVE   Result Value Ref Range    Glucose 94 65 - 99 mg/dL    BUN 12 8 - 27 mg/dL    Creatinine 1.04 0.76 - 1.27 mg/dL    GFR est non-AA 70 >59 mL/min/1.73    GFR est AA 81 >59 mL/min/1.73    BUN/Creatinine ratio 12 10 - 24    Sodium 144 134 - 144 mmol/L    Potassium 4.2 3.5 - 5.2 mmol/L    Chloride 107 (H) 96 - 106 mmol/L    CO2 24 20 - 29 mmol/L    Calcium 9.3 8.6 - 10.2 mg/dL    Protein, total 6.4 6.0 - 8.5 g/dL Albumin 4.0 3.5 - 4.8 g/dL    GLOBULIN, TOTAL 2.4 1.5 - 4.5 g/dL    A-G Ratio 1.7 1.2 - 2.2    Bilirubin, total 0.5 0.0 - 1.2 mg/dL    Alk. phosphatase 113 39 - 117 IU/L    AST (SGOT) 21 0 - 40 IU/L    ALT (SGPT) 17 0 - 44 IU/L           ASSESSMENT and PLAN    ICD-10-CM ICD-9-CM    1. Medicare annual wellness visit, subsequent Z00.00 V70.0    2. Advance directive discussed with patient Z71.89 V65.49    3. Hypertension, essential I10 401.9    4. Hypercholesterolemia E78.00 272.0    5. COPD, moderate (HCC) J44.9 496      Diagnoses and all orders for this visit:    1. Medicare annual wellness visit, subsequent    2. Advance directive discussed with patient    3. Hypertension, essential  Hypertension is controlled. 4. Hypercholesterolemia  Hyperlipidemia is controlled    5. COPD, moderate ([address]  Stable with no need for albuterol. Follow-up and Dispositions    · Return in about 6 months (around 2/26/2020) for htn, chol, .       lab results and schedule of future lab studies reviewed with patient  reviewed diet, exercise and weight control  I have discussed the diagnosis, evaluation and treatment options and the intended plan with the patient. Patient understands and is in agreement. The patient has received an after-visit summary and questions were answered concerning future plans. I have discussed side effects and warnings of any new medications with the patient as well.

## 2019-08-26 ENCOUNTER — OFFICE VISIT (OUTPATIENT)
Dept: INTERNAL MEDICINE CLINIC | Facility: CLINIC | Age: 74
End: 2019-08-26

## 2019-08-26 VITALS
HEIGHT: 70 IN | BODY MASS INDEX: 23.91 KG/M2 | RESPIRATION RATE: 12 BRPM | WEIGHT: 167 LBS | TEMPERATURE: 97.7 F | SYSTOLIC BLOOD PRESSURE: 127 MMHG | DIASTOLIC BLOOD PRESSURE: 86 MMHG | HEART RATE: 68 BPM | OXYGEN SATURATION: 95 %

## 2019-08-26 DIAGNOSIS — E78.00 HYPERCHOLESTEROLEMIA: ICD-10-CM

## 2019-08-26 DIAGNOSIS — Z71.89 ADVANCE DIRECTIVE DISCUSSED WITH PATIENT: ICD-10-CM

## 2019-08-26 DIAGNOSIS — J44.9 COPD, MODERATE (HCC): ICD-10-CM

## 2019-08-26 DIAGNOSIS — Z00.00 MEDICARE ANNUAL WELLNESS VISIT, SUBSEQUENT: Primary | ICD-10-CM

## 2019-08-26 DIAGNOSIS — I10 HYPERTENSION, ESSENTIAL: ICD-10-CM

## 2019-08-26 NOTE — PATIENT INSTRUCTIONS
Remember to get your flu shot in September or October. You may call us for a nurse appointment or get this from your pharmacy. The best way to stay healthy is to live a healthy lifestyle. A healthy lifestyle includes regular exercise, eating a well-balanced diet, keeping a healthy weight and not smoking. Regular physical exams and screening tests are another important way to take care of yourself. Preventive exams provided by health care providers can find health problems early when treatment works best and can keep you from getting certain diseases or illnesses. Preventive services include exams, lab tests, screenings, shots, monitoring and information to help you take care of your own health. All people over 65 should have a pneumonia shot. Pneumonia shots are usually only needed once in a lifetime unless your doctor decides differently. In addition to your physical exam, some screening tests are recommended:    All people over 65 should have a yearly flu shot. People over 65 are at medium to high risk for Hepatitis B. Three shots are needed for complete protection. Bone mass measurement (dexa scan) is recommended every two years. Diabetes Mellitus screening is recommended every year. Glaucoma is an eye disease caused by high pressure in the eye. An eye exam is recommended every year. Cardiovascular screening tests that check your cholesterol and other blood fat (lipid) levels are recommended every five years. Colorectal Cancer screening tests help to find pre-cancerous polyps (growths in the colon) so they can be removed before they turn into cancer. Tests ordered for screening depend on your personal and family history risk factors. Prostate Cancer Screening (annually up to age 76)    Screening for breast cancer is recommended yearly with a Mammogram.    Screening for cervical and vaginal cancer is recommended with a pelvic and Pap test every two years.  However if you have had an abnormal pap in the past  three years or at high risk for cervical or vaginal cancer Medicare will cover a pap test and a pelvic exam every year. Here is a list of your current Health Maintenance items with a due date:  Health Maintenance Due   Topic Date Due    Shingles Vaccine (1 of 2) 05/20/1995    Annual Well Visit  06/16/2019    Flu Vaccine  08/01/2019          Well Visit, Over 72: Care Instructions  Your Care Instructions    Physical exams can help you stay healthy. Your doctor has checked your overall health and may have suggested ways to take good care of yourself. He or she also may have recommended tests. At home, you can help prevent illness with healthy eating, regular exercise, and other steps. Follow-up care is a key part of your treatment and safety. Be sure to make and go to all appointments, and call your doctor if you are having problems. It's also a good idea to know your test results and keep a list of the medicines you take. How can you care for yourself at home? · Reach and stay at a healthy weight. This will lower your risk for many problems, such as obesity, diabetes, heart disease, and high blood pressure. · Get at least 30 minutes of exercise on most days of the week. Walking is a good choice. You also may want to do other activities, such as running, swimming, cycling, or playing tennis or team sports. · Do not smoke. Smoking can make health problems worse. If you need help quitting, talk to your doctor about stop-smoking programs and medicines. These can increase your chances of quitting for good. · Protect your skin from too much sun. When you're outdoors from 10 a.m. to 4 p.m., stay in the shade or cover up with clothing and a hat with a wide brim. Wear sunglasses that block UV rays. Even when it's cloudy, put broad-spectrum sunscreen (SPF 30 or higher) on any exposed skin.   · See a dentist one or two times a year for checkups and to have your teeth cleaned. · Wear a seat belt in the car. · Limit alcohol to 2 drinks a day for men and 1 drink a day for women. Too much alcohol can cause health problems. Follow your doctor's advice about when to have certain tests. These tests can spot problems early. For men and women  · Cholesterol. Your doctor will tell you how often to have this done based on your overall health and other things that can increase your risk for heart attack and stroke. · Blood pressure. Have your blood pressure checked during a routine doctor visit. Your doctor will tell you how often to check your blood pressure based on your age, your blood pressure results, and other factors. · Diabetes. Ask your doctor whether you should have tests for diabetes. · Vision. Experts recommend that you have yearly exams for glaucoma and other age-related eye problems. · Hearing. Tell your doctor if you notice any change in your hearing. You can have tests to find out how well you hear. · Colon cancer tests. Keep having colon cancer tests as your doctor recommends. You can have one of several types of tests. · Heart attack and stroke risk. At least every 4 to 6 years, you should have your risk for heart attack and stroke assessed. Your doctor uses factors such as your age, blood pressure, cholesterol, and whether you smoke or have diabetes to show what your risk for a heart attack or stroke is over the next 10 years. · Osteoporosis. Talk to your doctor about whether you should have a bone density test to find out whether you have thinning bones. Also ask your doctor about whether you should take calcium and vitamin D supplements. For women  · Pap test and pelvic exam. You may no longer need a Pap test. Talk with your doctor about whether to stop or continue to have Pap tests. · Breast exam and mammogram. Ask how often you should have a mammogram, which is an X-ray of your breasts.  A mammogram can spot breast cancer before it can be felt and when it is easiest to treat. · Thyroid disease. Talk to your doctor about whether to have your thyroid checked as part of a regular physical exam. Women have an increased chance of a thyroid problem. For men  · Prostate exam. Talk to your doctor about whether you should have a blood test (called a PSA test) for prostate cancer. Experts recommend that you discuss the benefits and risks of the test with your doctor before you decide whether to have this test. Some experts say that men ages 79 and older no longer need testing. · Abdominal aortic aneurysm. Ask your doctor whether you should have a test to check for an aneurysm. You may need a test if you ever smoked or if your parent, brother, sister, or child has had an aneurysm. When should you call for help? Watch closely for changes in your health, and be sure to contact your doctor if you have any problems or symptoms that concern you. Where can you learn more? Go to http://ronald-biju.info/. Enter C858 in the search box to learn more about \"Well Visit, Over 65: Care Instructions. \"  Current as of: December 13, 2018  Content Version: 12.1  © 4767-8441 [employer]. Care instructions adapted under license by [employer] (which disclaims liability or warranty for this information). If you have questions about a medical condition or this instruction, always ask your healthcare professional. [address] any warranty or liability for your use of this information.

## 2019-08-26 NOTE — PROGRESS NOTES
Leandra Rebolledo  Identified pt with two pt identifiers(name and ). Chief Complaint   Patient presents with    Hypertension    Cholesterol Problem    COPD       Reviewed record In preparation for visit and have obtained necessary documentation. Advanced directive / living will on file. 1. Have you been to the ER, urgent care clinic or hospitalized since your last visit? No     2. Have you seen or consulted any other health care providers outside of the [address] [place] [address] since your last visit? Include any pap smears or colon screening. Dr Jann Beal reviewed with provider.     Health Maintenance reviewed:     Health Maintenance Due   Topic    Shingrix Vaccine Age 50> (1 of 2)    MEDICARE YEARLY EXAM     Influenza Age 5 to Adult           Wt Readings from Last 3 Encounters:   19 167 lb (75.8 kg)   19 171 lb (77.6 kg)   18 173 lb (78.5 kg)        Temp Readings from Last 3 Encounters:   19 97.7 °F (36.5 °C) (Oral)   19 97.6 °F (36.4 °C) (Oral)   18 97.5 °F (36.4 °C) (Oral)        BP Readings from Last 3 Encounters:   19 127/86   19 132/80   18 123/80        Pulse Readings from Last 3 Encounters:   19 68   19 67   18 69        Vitals:    19 0844   BP: 127/86   Pulse: 68   Resp: 12   Temp: 97.7 °F (36.5 °C)   TempSrc: Oral   SpO2: 95%   Weight: 167 lb (75.8 kg)   Height: 5' 10\" (1.778 m)   PainSc:   0 - No pain          Learning Assessment:   :       Learning Assessment 2017   PRIMARY LEARNER Patient Patient Patient   HIGHEST LEVEL OF EDUCATION - PRIMARY LEARNER  - - DID NOT GRADUATE HIGH SCHOOL   BARRIERS PRIMARY LEARNER - - NONE   CO-LEARNER CAREGIVER - - No   PRIMARY LANGUAGE ENGLISH ENGLISH ENGLISH   LEARNER PREFERENCE PRIMARY DEMONSTRATION DEMONSTRATION READING   ANSWERED BY patient patient self   RELATIONSHIP SELF SELF SELF        Depression Screening:   :       3 most recent PHQ Screens 3/5/2019   Little interest or pleasure in doing things Not at all   Feeling down, depressed, irritable, or hopeless Not at all   Total Score PHQ 2 0        Fall Risk Assessment:   :       Fall Risk Assessment, last 12 mths 8/26/2019   Able to walk? Yes   Fall in past 12 months? No   Fall with injury? -   Number of falls in past 12 months -   Fall Risk Score -        Abuse Screening:   :       Abuse Screening Questionnaire 8/26/2019 10/15/2018 10/24/2017 10/11/2016 9/10/2015 9/17/2014   Do you ever feel afraid of your partner? N N N N N N   Are you in a relationship with someone who physically or mentally threatens you? N N N N N N   Is it safe for you to go home?  Y Y Y Y Y Y        ADL Screening:   :       ADL Assessment 3/31/2015   Feeding yourself No Help Needed   Getting from bed to chair No Help Needed   Getting dressed No Help Needed   Bathing or showering No Help Needed   Walk across the room (includes cane/walker) No Help Needed   Using the telphone No Help Needed   Taking your medications No Help Needed   Preparing meals No Help Needed   Managing money (expenses/bills) No Help Needed   Moderately strenuous housework (laundry) No Help Needed   Shopping for personal items (toiletries/medicines) No Help Needed   Shopping for groceries No Help Needed   Driving No Help Needed   Climbing a flight of stairs No Help Needed   Getting to places beyond walking distances No Help Needed

## 2019-10-25 ENCOUNTER — TELEPHONE (OUTPATIENT)
Dept: INTERNAL MEDICINE CLINIC | Facility: CLINIC | Age: 74
End: 2019-10-25

## 2019-10-25 ENCOUNTER — CLINICAL SUPPORT (OUTPATIENT)
Dept: INTERNAL MEDICINE CLINIC | Facility: CLINIC | Age: 74
End: 2019-10-25

## 2019-10-25 VITALS — TEMPERATURE: 98.4 F

## 2019-10-25 DIAGNOSIS — Z23 ENCOUNTER FOR IMMUNIZATION: Primary | ICD-10-CM

## 2019-10-25 NOTE — PATIENT INSTRUCTIONS
Vaccine Information Statement     Tdap (Tetanus, Diphtheria, Pertussis) Vaccine: What You Need to Know    Many Vaccine Information Statements are available in [place]ian and other languages. See www.immunize.org/vis. Hojas de Información Sobre Vacunas están disponibles en español y en muchos otros idiomas. Visite [employer].si    1. Why get vaccinated? Tetanus, diphtheria, and pertussis are very serious diseases. Tdap vaccine can protect us from these diseases. And, Tdap vaccine given to pregnant women can protect  babies against pertussis. TETANUS (Lockjaw) is rare in the [facility] today. It causes painful muscle tightening and stiffness, usually all over the body.  It can lead to tightening of muscles in the head and neck so you cant open your mouth, swallow, or sometimes even breathe. Tetanus kills about 1 out of 10 people who are infected even after receiving the best medical care. DIPHTHERIA is also rare in the [facility] today. It can cause a thick coating to form in the back of the throat.  It can lead to breathing problems, heart failure, paralysis, and death. PERTUSSIS (Whooping Cough) causes severe coughing spells, which can cause difficulty breathing, vomiting, and disturbed sleep.  It can also lead to weight loss, incontinence, and rib fractures. Up to 2 in 100 adolescents and 5 in 100 adults with pertussis are hospitalized or have complications, which could include pneumonia or death. These diseases are caused by bacteria. Diphtheria and pertussis are spread from person to person through secretions from coughing or sneezing. Tetanus enters the body through cuts, scratches, or wounds. Before vaccines, as many as 200,000 cases of diphtheria, 200,000 cases of pertussis, and hundreds of cases of tetanus, were reported in the [place] each year.  Since vaccination began, reports of cases for tetanus and diphtheria have dropped by about 99% and for pertussis by about 80%. 2. Tdap vaccine    Tdap vaccine can protect adolescents and adults from tetanus, diphtheria, and pertussis. One dose of Tdap is routinely given at age 6 or 15. People who did not get Tdap at that age should get it as soon as possible. Tdap is especially important for health care professionals and anyone having close contact with a baby younger than 12 months. Pregnant women should get a dose of Tdap during every pregnancy, to protect the  from pertussis. Infants are most at risk for severe, life-threatening complications from pertussis. Another vaccine, called Td, protects against tetanus and diphtheria, but not pertussis. A Td booster should be given every 10 years. Tdap may be given as one of these boosters if you have never gotten Tdap before. Tdap may also be given after a severe cut or burn to prevent tetanus infection. Your doctor or the person giving you the vaccine can give you more information. Tdap may safely be given at the same time as other vaccines. 3. Some people should not get this vaccine     A person who has ever had a life-threatening allergic reaction after a previous dose of any diphtheria, tetanus or pertussis containing vaccine, OR has a severe allergy to any part of this vaccine, should not get Tdap vaccine. Tell the person giving the vaccine about any severe allergies.  Anyone who had coma or long repeated seizures within 7 days after a childhood dose of DTP or DTaP, or a previous dose of Tdap, should not get Tdap, unless a cause other than the vaccine was found. They can still get Td.  Talk to your doctor if you:  - have seizures or another nervous system problem,  - had severe pain or swelling after any vaccine containing diphtheria, tetanus or pertussis,   - ever had a condition called Guillain Barré Syndrome (GBS),  - arent feeling well on the day the shot is scheduled.     4. Risks    With any medicine, including vaccines, there is a chance of side effects. These are usually mild and go away on their own. Serious reactions are also possible but are rare. Most people who get Tdap vaccine do not have any problems with it. Mild Problems following Tdap  (Did not interfere with activities)   Pain where the shot was given (about 3 in 4 adolescents or 2 in 3 adults)   Redness or swelling where the shot was given (about 1 person in 5)   Mild fever of at least 100.4°F (up to about 1 in 25 adolescents or 1 in 100 adults)   Headache (about 3 or 4 people in 10)   Tiredness (about 1 person in 3 or 4)   Nausea, vomiting, diarrhea, stomach ache (up to 1 in 4 adolescents or 1 in 10 adults)   Chills,  sore joints (about 1 person in 10)   Body aches (about 1 person in 3 or 4)    Rash, swollen glands (uncommon)    Moderate Problems following Tdap  (Interfered with activities, but did not require medical attention)   Pain where the shot was given (up to 1 in 5 or 6)    Redness or swelling where the shot was given (up to about 1 in 16 adolescents or 1 in 12 adults)   Fever over 102°F (about 1 in 100 adolescents or 1 in 250 adults)   Headache (about 1 in 7 adolescents or 1 in 10 adults)   Nausea, vomiting, diarrhea, stomach ache (up to 1 or 3 people in 100)   Swelling of the entire arm where the shot was given (up to about 1 in 500). Severe Problems following Tdap  (Unable to perform usual activities; required medical attention)   Swelling, severe pain, bleeding, and redness in the arm where the shot was given (rare). Problems that could happen after any vaccine:     People sometimes faint after a medical procedure, including vaccination. Sitting or lying down for about 15 minutes can help prevent fainting, and injuries caused by a fall. Tell your doctor if you feel dizzy, or have vision changes or ringing in the ears.      Some people get severe pain in the shoulder and have difficulty moving the arm where a shot was given. This happens very rarely.  Any medication can cause a severe allergic reaction. Such reactions from a vaccine are very rare, estimated at fewer than 1 in a million doses, and would happen within a few minutes to a few hours after the vaccination. As with any medicine, there is a very remote chance of a vaccine causing a serious injury or death. The safety of vaccines is always being monitored. For more information, visit: www.cdc.gov/vaccinesafety/    5. What if there is a serious problem? What should I look for?  Look for anything that concerns you, such as signs of a severe allergic reaction, very high fever, or unusual behavior.  Signs of a severe allergic reaction can include hives, swelling of the face and throat, difficulty breathing, a fast heartbeat, dizziness, and weakness. These would usually start a few minutes to a few hours after the vaccination. What should I do?  If you think it is a severe allergic reaction or other emergency that cant wait, call 9-1-1 or get the person to the nearest hospital. Otherwise, call your doctor.  Afterward, the reaction should be reported to the Vaccine Adverse Event Reporting System (VAERS). Your doctor might file this report, or you can do it yourself through the VAERS web site at www.vaers. [facility].gov, or by calling 7-211.683.1059. VAERS does not give medical advice. 6. The National Vaccine Injury Compensation Program    The [facility] Vaccine Injury Compensation Program (VICP) is a federal program that was created to compensate people who may have been injured by certain vaccines. Persons who believe they may have been injured by a vaccine can learn about the program and about filing a claim by calling 1-866.151.5081 or visiting the [place] website at www.[facility].gov/vaccinecompensation. There is a time limit to file a claim for compensation. 7. How can I learn more?  Ask your doctor.  He or she can give you the vaccine package insert or suggest other sources of information.  Call your local or state health department.  Contact the Centers for Disease Control and Prevention (CDC):  - Call 1-285.740.8001 (1-800-CDC-INFO) or  - Visit CDCs website at www.cdc.gov/vaccines      Vaccine Information Statement   Tdap Vaccine  (2/24/2015)  42 YO[address] Villa [id number]    Department of Health and Human Services  Centers for Disease Control and Prevention    Office Use Only

## 2019-10-25 NOTE — PROGRESS NOTES
A piece of wire stored in garage jabbed into hypothenar area of left hand today. He was at [facility] for sore throat earlier today and given cefuroxime. Th is should be adequate antibacterial prophylaxis for puncture wound. Tetanus vaccine updated. There is a tiny puncture emely at base of the thenar eminence of left hand. No surrounding erythema or swelling.

## 2019-10-25 NOTE — TELEPHONE ENCOUNTER
Clean piece of metal was sticking up in  garage, he didn't see it and stuck his hand on top of it. Last tetanus was 2012.

## 2019-10-25 NOTE — PROGRESS NOTES
After verbal order read back of , patient received TDAP  in right arm. Boostrix 0.5ml NDC  17029-489-05 lot [id number] exp 01/11/2022. Patient tolerated procedure without complaints and received VIS.

## 2019-10-25 NOTE — TELEPHONE ENCOUNTER
----- Message from Chava Jones sent at 10/25/2019  2:10 PM EDT -----  Regarding: Dr. Dhaval Dueñas  Pt request for a call back from the practice in regards to when he has his last \"Tetnis\" shot. He states that a metal wire stuck his hand. best contact number is [phone].

## 2019-11-13 ENCOUNTER — CLINICAL SUPPORT (OUTPATIENT)
Dept: INTERNAL MEDICINE CLINIC | Facility: CLINIC | Age: 74
End: 2019-11-13

## 2019-11-13 DIAGNOSIS — Z23 ENCOUNTER FOR IMMUNIZATION: Primary | ICD-10-CM

## 2019-11-13 NOTE — PATIENT INSTRUCTIONS
Vaccine Information Statement    Influenza (Flu) Vaccine (Inactivated or Recombinant): What You Need to Know    Many Vaccine Information Statements are available in [place] and other languages. See www.immunize.org/vis  Hojas de información sobre vacunas están disponibles en español y en muchos otros idiomas. Visite www.immunize.org/vis    1. Why get vaccinated? Influenza vaccine can prevent influenza (flu). Flu is a contagious disease that spreads around the [facility] every year, usually between October and May. Anyone can get the flu, but it is more dangerous for some people. Infants and young children, people 72years of age and older, pregnant women, and people with certain health conditions or a weakened immune system are at greatest risk of flu complications. Pneumonia, bronchitis, sinus infections and ear infections are examples of flu-related complications. If you have a medical condition, such as heart disease, cancer or diabetes, flu can make it worse. Flu can cause fever and chills, sore throat, muscle aches, fatigue, cough, headache, and runny or stuffy nose. Some people may have vomiting and diarrhea, though this is more common in children than adults. Each year thousands of people in the [facility] die from flu, and many more are hospitalized. Flu vaccine prevents millions of illnesses and flu-related visits to the doctor each year. 2. Influenza vaccines     CDC recommends everyone 10months of age and older get vaccinated every flu season. Children 6 months through 6years of age may need 2 doses during a single flu season. Everyone else needs only 1 dose each flu season. It takes about 2 weeks for protection to develop after vaccination. There are many flu viruses, and they are always changing. Each year a new flu vaccine is made to protect against three or four viruses that are likely to cause disease in the upcoming flu season.  Even when the vaccine doesnt exactly match these viruses, it may still provide some protection. Influenza vaccine does not cause flu. Influenza vaccine may be given at the same time as other vaccines. 3. Talk with your health care provider    Tell your vaccine provider if the person getting the vaccine:   Has had an allergic reaction after a previous dose of influenza vaccine, or has any severe, life-threatening allergies.  Has ever had Guillain-Barré Syndrome (also called GBS). In some cases, your health care provider may decide to postpone influenza vaccination to a future visit. People with minor illnesses, such as a cold, may be vaccinated. People who are moderately or severely ill should usually wait until they recover before getting influenza vaccine. Your health care provider can give you more information. 4. Risks of a reaction     Soreness, redness, and swelling where shot is given, fever, muscle aches, and headache can happen after influenza vaccine.  There may be a very small increased risk of Guillain-Barré Syndrome (GBS) after inactivated influenza vaccine (the flu shot). [facility] children who get the flu shot along with pneumococcal vaccine (PCV13), and/or DTaP vaccine at the same time might be slightly more likely to have a seizure caused by fever. Tell your health care provider if a child who is getting flu vaccine has ever had a seizure. People sometimes faint after medical procedures, including vaccination. Tell your provider if you feel dizzy or have vision changes or ringing in the ears. As with any medicine, there is a very remote chance of a vaccine causing a severe allergic reaction, other serious injury, or death. 5. What if there is a serious problem? An allergic reaction could occur after the vaccinated person leaves the clinic.  If you see signs of a severe allergic reaction (hives, swelling of the face and throat, difficulty breathing, a fast heartbeat, dizziness, or weakness), call 9-1-1 and get the person to the nearest hospital.    For other signs that concern you, call your health care provider. Adverse reactions should be reported to the Vaccine Adverse Event Reporting System (VAERS). Your health care provider will usually file this report, or you can do it yourself. Visit the VAERS website at www.vaers. [facility].gov or call 6-896.269.1677. VAERS is only for reporting reactions, and VAERS staff do not give medical advice. 6. The National Vaccine Injury Compensation Program    The [facility] Vaccine Injury Compensation Program (VICP) is a federal program that was created to compensate people who may have been injured by certain vaccines. Visit the VICP website at www.hrsa.gov/vaccinecompensation or call 6-410.128.6798 to learn about the program and about filing a claim. There is a time limit to file a claim for compensation. 7. How can I learn more?  Ask your health care provider.  Call your local or state health department.  Contact the Centers for Disease Control and Prevention (CDC):  - Call 3-806.470.5560 (1-800-CDC-INFO) or  - Visit CDCs influenza website at www.cdc.gov/flu    Vaccine Information Statement (Interim)  Inactivated Influenza Vaccine   8/15/2019  42 YO[address] Villa [id number]   Department of Health and Human Services  Centers for Disease Control and Prevention    Office Use Only

## 2019-11-13 NOTE — PROGRESS NOTES
After verbal order read back of , patient received High Dose Flu Shot (Adjuvanted Fluad) in left arm. [address] [id number] Lot [id number] Exp 06/30/20. Patient tolerated procedure without complaints and received VIS.

## 2020-01-01 ENCOUNTER — APPOINTMENT (OUTPATIENT)
Dept: CT IMAGING | Age: 75
DRG: 948 | End: 2020-01-01
Attending: EMERGENCY MEDICINE
Payer: MEDICARE

## 2020-01-01 ENCOUNTER — APPOINTMENT (OUTPATIENT)
Dept: GENERAL RADIOLOGY | Age: 75
DRG: 948 | End: 2020-01-01
Attending: INTERNAL MEDICINE
Payer: MEDICARE

## 2020-01-01 ENCOUNTER — HOSPITAL ENCOUNTER (INPATIENT)
Age: 75
LOS: 1 days | Discharge: HOME | DRG: 948 | End: 2020-01-02
Attending: EMERGENCY MEDICINE | Admitting: INTERNAL MEDICINE
Payer: MEDICARE

## 2020-01-01 DIAGNOSIS — I65.23 BILATERAL CAROTID ARTERY STENOSIS: ICD-10-CM

## 2020-01-01 DIAGNOSIS — G45.1 TRANSIENT ISCHEMIC ATTACK INVOLVING RIGHT INTERNAL CAROTID ARTERY: ICD-10-CM

## 2020-01-01 DIAGNOSIS — R41.3 DISTURBANCE OF MEMORY: ICD-10-CM

## 2020-01-01 DIAGNOSIS — I10 HYPERTENSION, ESSENTIAL: ICD-10-CM

## 2020-01-01 DIAGNOSIS — G45.9 TIA (TRANSIENT ISCHEMIC ATTACK): Primary | ICD-10-CM

## 2020-01-01 DIAGNOSIS — I63.9 CEREBROVASCULAR ACCIDENT (CVA), UNSPECIFIED MECHANISM (HCC): ICD-10-CM

## 2020-01-01 LAB
ALBUMIN SERPL-MCNC: 3.7 G/DL (ref 3.5–5)
ALBUMIN/GLOB SERPL: 1.1 {RATIO} (ref 1.1–2.2)
ALP SERPL-CCNC: 122 U/L (ref 45–117)
ALT SERPL-CCNC: 29 U/L (ref 12–78)
ANION GAP SERPL CALC-SCNC: 5 MMOL/L (ref 5–15)
APPEARANCE UR: CLEAR
AST SERPL-CCNC: 24 U/L (ref 15–37)
BASOPHILS # BLD: 0.1 K/UL (ref 0–0.1)
BASOPHILS NFR BLD: 1 % (ref 0–1)
BILIRUB SERPL-MCNC: 0.2 MG/DL (ref 0.2–1)
BILIRUB UR QL: NEGATIVE
BUN SERPL-MCNC: 13 MG/DL (ref 6–20)
BUN/CREAT SERPL: 13 (ref 12–20)
CALCIUM SERPL-MCNC: 9 MG/DL (ref 8.5–10.1)
CHLORIDE SERPL-SCNC: 110 MMOL/L (ref 97–108)
CHOLEST SERPL-MCNC: 162 MG/DL
CO2 SERPL-SCNC: 25 MMOL/L (ref 21–32)
COLOR UR: NORMAL
CREAT BLD-MCNC: 1.2 MG/DL (ref 0.6–1.3)
CREAT SERPL-MCNC: 1.04 MG/DL (ref 0.7–1.3)
CRP SERPL HS-MCNC: 0.7 MG/L
DIFFERENTIAL METHOD BLD: NORMAL
EOSINOPHIL # BLD: 0.3 K/UL (ref 0–0.4)
EOSINOPHIL NFR BLD: 5 % (ref 0–7)
ERYTHROCYTE [DISTWIDTH] IN BLOOD BY AUTOMATED COUNT: 13.2 % (ref 11.5–14.5)
ERYTHROCYTE [SEDIMENTATION RATE] IN BLOOD: 6 MM/HR (ref 0–20)
EST. AVERAGE GLUCOSE BLD GHB EST-MCNC: 123 MG/DL
GLOBULIN SER CALC-MCNC: 3.5 G/DL (ref 2–4)
GLUCOSE BLD STRIP.AUTO-MCNC: 116 MG/DL (ref 65–100)
GLUCOSE BLD STRIP.AUTO-MCNC: 79 MG/DL (ref 65–100)
GLUCOSE SERPL-MCNC: 96 MG/DL (ref 65–100)
GLUCOSE UR STRIP.AUTO-MCNC: NEGATIVE MG/DL
HBA1C MFR BLD: 5.9 % (ref 4–5.6)
HCT VFR BLD AUTO: 43.4 % (ref 36.6–50.3)
HDLC SERPL-MCNC: 51 MG/DL
HDLC SERPL: 3.2 {RATIO} (ref 0–5)
HGB BLD-MCNC: 14.4 G/DL (ref 12.1–17)
HGB UR QL STRIP: NEGATIVE
IMM GRANULOCYTES # BLD AUTO: 0 K/UL (ref 0–0.04)
IMM GRANULOCYTES NFR BLD AUTO: 0 % (ref 0–0.5)
KETONES UR QL STRIP.AUTO: NEGATIVE MG/DL
LDLC SERPL CALC-MCNC: 90.8 MG/DL (ref 0–100)
LEUKOCYTE ESTERASE UR QL STRIP.AUTO: NEGATIVE
LIPID PROFILE,FLP: NORMAL
LYMPHOCYTES # BLD: 1.9 K/UL (ref 0.8–3.5)
LYMPHOCYTES NFR BLD: 29 % (ref 12–49)
MCH RBC QN AUTO: 30.5 PG (ref 26–34)
MCHC RBC AUTO-ENTMCNC: 33.2 G/DL (ref 30–36.5)
MCV RBC AUTO: 91.9 FL (ref 80–99)
MONOCYTES # BLD: 0.6 K/UL (ref 0–1)
MONOCYTES NFR BLD: 9 % (ref 5–13)
NEUTS SEG # BLD: 3.7 K/UL (ref 1.8–8)
NEUTS SEG NFR BLD: 56 % (ref 32–75)
NITRITE UR QL STRIP.AUTO: NEGATIVE
NRBC # BLD: 0 K/UL (ref 0–0.01)
NRBC BLD-RTO: 0 PER 100 WBC
PH UR STRIP: 6 [PH] (ref 5–8)
PLATELET # BLD AUTO: 246 K/UL (ref 150–400)
PMV BLD AUTO: 9.6 FL (ref 8.9–12.9)
POTASSIUM SERPL-SCNC: 3.7 MMOL/L (ref 3.5–5.1)
PROT SERPL-MCNC: 7.2 G/DL (ref 6.4–8.2)
PROT UR STRIP-MCNC: NEGATIVE MG/DL
RBC # BLD AUTO: 4.72 M/UL (ref 4.1–5.7)
SERVICE CMNT-IMP: ABNORMAL
SERVICE CMNT-IMP: NORMAL
SODIUM SERPL-SCNC: 140 MMOL/L (ref 136–145)
SP GR UR REFRACTOMETRY: 1.01 (ref 1–1.03)
T3FREE SERPL-MCNC: 2.2 PG/ML (ref 2.2–4)
T4 FREE SERPL-MCNC: 0.7 NG/DL (ref 0.8–1.5)
TRIGL SERPL-MCNC: 101 MG/DL (ref ?–150)
TROPONIN I SERPL-MCNC: <0.05 NG/ML
TSH SERPL DL<=0.05 MIU/L-ACNC: 1.19 UIU/ML (ref 0.36–3.74)
UROBILINOGEN UR QL STRIP.AUTO: 0.2 EU/DL (ref 0.2–1)
VLDLC SERPL CALC-MCNC: 20.2 MG/DL
WBC # BLD AUTO: 6.5 K/UL (ref 4.1–11.1)

## 2020-01-01 PROCEDURE — 74011250637 HC RX REV CODE- 250/637: Performed by: INTERNAL MEDICINE

## 2020-01-01 PROCEDURE — 84484 ASSAY OF TROPONIN QUANT: CPT

## 2020-01-01 PROCEDURE — 80053 COMPREHEN METABOLIC PANEL: CPT

## 2020-01-01 PROCEDURE — 71045 X-RAY EXAM CHEST 1 VIEW: CPT

## 2020-01-01 PROCEDURE — 83036 HEMOGLOBIN GLYCOSYLATED A1C: CPT

## 2020-01-01 PROCEDURE — 84481 FREE ASSAY (FT-3): CPT

## 2020-01-01 PROCEDURE — 70450 CT HEAD/BRAIN W/O DYE: CPT

## 2020-01-01 PROCEDURE — 74011636320 HC RX REV CODE- 636/320: Performed by: EMERGENCY MEDICINE

## 2020-01-01 PROCEDURE — 74011250636 HC RX REV CODE- 250/636: Performed by: INTERNAL MEDICINE

## 2020-01-01 PROCEDURE — 80061 LIPID PANEL: CPT

## 2020-01-01 PROCEDURE — 93005 ELECTROCARDIOGRAM TRACING: CPT

## 2020-01-01 PROCEDURE — 36415 COLL VENOUS BLD VENIPUNCTURE: CPT

## 2020-01-01 PROCEDURE — 81003 URINALYSIS AUTO W/O SCOPE: CPT

## 2020-01-01 PROCEDURE — 82565 ASSAY OF CREATININE: CPT

## 2020-01-01 PROCEDURE — 74011250636 HC RX REV CODE- 250/636: Performed by: EMERGENCY MEDICINE

## 2020-01-01 PROCEDURE — 86141 C-REACTIVE PROTEIN HS: CPT

## 2020-01-01 PROCEDURE — 70496 CT ANGIOGRAPHY HEAD: CPT

## 2020-01-01 PROCEDURE — 85652 RBC SED RATE AUTOMATED: CPT

## 2020-01-01 PROCEDURE — 74011250637 HC RX REV CODE- 250/637: Performed by: EMERGENCY MEDICINE

## 2020-01-01 PROCEDURE — 82962 GLUCOSE BLOOD TEST: CPT

## 2020-01-01 PROCEDURE — 84439 ASSAY OF FREE THYROXINE: CPT

## 2020-01-01 PROCEDURE — 65660000000 HC RM CCU STEPDOWN

## 2020-01-01 PROCEDURE — 85025 COMPLETE CBC W/AUTO DIFF WBC: CPT

## 2020-01-01 PROCEDURE — 99285 EMERGENCY DEPT VISIT HI MDM: CPT

## 2020-01-01 PROCEDURE — 84443 ASSAY THYROID STIM HORMONE: CPT

## 2020-01-01 RX ORDER — SODIUM CHLORIDE 9 MG/ML
125 INJECTION, SOLUTION INTRAVENOUS CONTINUOUS
Status: DISCONTINUED | OUTPATIENT
Start: 2020-01-01 | End: 2020-01-01

## 2020-01-01 RX ORDER — ONDANSETRON 2 MG/ML
4 INJECTION INTRAMUSCULAR; INTRAVENOUS
Status: DISCONTINUED | OUTPATIENT
Start: 2020-01-01 | End: 2020-01-02 | Stop reason: HOSPADM

## 2020-01-01 RX ORDER — SODIUM CHLORIDE 9 MG/ML
50 INJECTION, SOLUTION INTRAVENOUS CONTINUOUS
Status: DISCONTINUED | OUTPATIENT
Start: 2020-01-01 | End: 2020-01-02 | Stop reason: HOSPADM

## 2020-01-01 RX ORDER — LABETALOL HYDROCHLORIDE 5 MG/ML
10 INJECTION, SOLUTION INTRAVENOUS
Status: DISCONTINUED | OUTPATIENT
Start: 2020-01-01 | End: 2020-01-02 | Stop reason: HOSPADM

## 2020-01-01 RX ORDER — PRAVASTATIN SODIUM 40 MG/1
40 TABLET ORAL
Status: DISCONTINUED | OUTPATIENT
Start: 2020-01-01 | End: 2020-01-02

## 2020-01-01 RX ORDER — CLOPIDOGREL BISULFATE 75 MG/1
300 TABLET ORAL
Status: COMPLETED | OUTPATIENT
Start: 2020-01-01 | End: 2020-01-01

## 2020-01-01 RX ORDER — LISINOPRIL 20 MG/1
20 TABLET ORAL DAILY
Status: DISCONTINUED | OUTPATIENT
Start: 2020-01-02 | End: 2020-01-02 | Stop reason: HOSPADM

## 2020-01-01 RX ORDER — SODIUM CHLORIDE 0.9 % (FLUSH) 0.9 %
10 SYRINGE (ML) INJECTION
Status: COMPLETED | OUTPATIENT
Start: 2020-01-01 | End: 2020-01-01

## 2020-01-01 RX ORDER — ASPIRIN 325 MG
325 TABLET ORAL ONCE
Status: COMPLETED | OUTPATIENT
Start: 2020-01-01 | End: 2020-01-01

## 2020-01-01 RX ORDER — GUAIFENESIN 100 MG/5ML
81 LIQUID (ML) ORAL DAILY
Status: DISCONTINUED | OUTPATIENT
Start: 2020-01-02 | End: 2020-01-02

## 2020-01-01 RX ORDER — ENOXAPARIN SODIUM 100 MG/ML
40 INJECTION SUBCUTANEOUS EVERY 24 HOURS
Status: DISCONTINUED | OUTPATIENT
Start: 2020-01-01 | End: 2020-01-01

## 2020-01-01 RX ORDER — CLOPIDOGREL BISULFATE 75 MG/1
75 TABLET ORAL DAILY
Status: DISCONTINUED | OUTPATIENT
Start: 2020-01-02 | End: 2020-01-02 | Stop reason: HOSPADM

## 2020-01-01 RX ORDER — ACETAMINOPHEN 325 MG/1
650 TABLET ORAL
Status: DISCONTINUED | OUTPATIENT
Start: 2020-01-01 | End: 2020-01-02 | Stop reason: HOSPADM

## 2020-01-01 RX ORDER — ENOXAPARIN SODIUM 100 MG/ML
40 INJECTION SUBCUTANEOUS EVERY 24 HOURS
Status: DISCONTINUED | OUTPATIENT
Start: 2020-01-01 | End: 2020-01-02 | Stop reason: HOSPADM

## 2020-01-01 RX ORDER — IPRATROPIUM BROMIDE AND ALBUTEROL SULFATE 2.5; .5 MG/3ML; MG/3ML
3 SOLUTION RESPIRATORY (INHALATION)
Status: DISCONTINUED | OUTPATIENT
Start: 2020-01-01 | End: 2020-01-02 | Stop reason: HOSPADM

## 2020-01-01 RX ADMIN — IOPAMIDOL 100 ML: 755 INJECTION, SOLUTION INTRAVENOUS at 15:07

## 2020-01-01 RX ADMIN — ASPIRIN 325 MG: 325 TABLET, FILM COATED ORAL at 14:42

## 2020-01-01 RX ADMIN — ENOXAPARIN SODIUM 40 MG: 30 INJECTION SUBCUTANEOUS at 19:13

## 2020-01-01 RX ADMIN — SODIUM CHLORIDE 125 ML/HR: 900 INJECTION, SOLUTION INTRAVENOUS at 14:42

## 2020-01-01 RX ADMIN — PRAVASTATIN SODIUM 40 MG: 40 TABLET ORAL at 21:58

## 2020-01-01 RX ADMIN — CLOPIDOGREL BISULFATE 300 MG: 75 TABLET ORAL at 15:31

## 2020-01-01 RX ADMIN — SODIUM CHLORIDE 50 ML/HR: 900 INJECTION, SOLUTION INTRAVENOUS at 19:12

## 2020-01-01 RX ADMIN — Medication 10 ML: at 15:07

## 2020-01-01 NOTE — H&P
Hospitalist Admission Note    NAME: Baldemar Orr   :  1945   MRN:  [id number]     Date/Time:  2020 4:00 PM    Patient PCP: Sonny Ross MD  ______________________________________________________________________  Given the patient's current clinical presentation, I have a high level of concern for decompensation if discharged from the emergency department. Complex decision making was performed, which includes reviewing the patient's available past medical records, laboratory results, and x-ray films. My assessment of this patient's clinical condition and my plan of care is as follows. Assessment / Plan:  CVA vs TIA: CT head is negative, check MRI brain and full stroke protocol, c/w ASA, add Plavix, get Neurology evaluation. HTN: c/w Lisinopril, use labetalol prn allowing for permissive HTN. Hyperlipidemia: c/w Statin. COPD/Emphysema: use duonebs prn, so far tolerating room air. B/L Carotid Disease: seen in CTA, will check Carotid duplex, c/w ASA and plavix. Code Status: Full Code  Surrogate Decision Maker: wife Stefany Wood [phone]  DVT Prophylaxis: lovenox  GI Prophylaxis: not indicated  Baseline: independent lives with wife      Subjective:   CHIEF COMPLAINT: \"My left leg and arm were numb\"    HISTORY OF PRESENT ILLNESS:     Kevin Grossman is a 76 y.o.  male who  presents to the ED with cc of left leg weakness. The patient states this is his third episode of left leg weakness this week. The symptoms started 20 minutes prior to arrival.  Symptoms did include tingling in the left arm and left leg, but that has resolved. The patient denies headache, chest pain, back pain or trauma. He has had 2 additional episodes similar to this earlier in the week. Each episode lasted for 30 minutes before it resolved.   At this time patient is lying in bed states that symptoms had improved but had 3 episodes like  this before, with numbness and weakness of left leg, numbness on left arm, denies chest pain, no SOB, no N/V no diarrhea, no fever, no cough, no urinary symptoms, no other associated symptoms. We were asked to admit for work up and evaluation of the above problems. Past Medical History:   Diagnosis Date    Arthritis     knee    Cancer ([facility] [address]     skin    Chronic obstructive pulmonary disease ([facility] [address]     Hypercholesterolemia     Hypertension     Ill-defined condition     lipids    Smoking 2010    Stopped 2000         Past Surgical History:   Procedure Laterality Date    COLORECTAL SCRN; HI RISK IND  2016         ENDOSCOPY, COLON, DIAGNOSTIC  2007    Dr Morgan Dunn polyp repeat 2010    HX ORTHOPAEDIC Right 3/2016    arthroscopic; torn medial meniscus       Social History     Tobacco Use    Smoking status: Former Smoker     Packs/day: 2.00     Years: 40.00     Pack years: 80.00     Last attempt to quit: 2000     Years since quittin.7    Smokeless tobacco: Never Used   Substance Use Topics    Alcohol use: Yes     Alcohol/week: 0.0 standard drinks     Types: 2 - 3 Cans of beer per week     Comment: Soc        Family History   Problem Relation Age of Onset    Hypertension Mother     Thyroid Disease Mother     Kidney Disease Father     Cancer Father         skin    Diabetes Father     Cancer Paternal Aunt         lung    Cancer Paternal Uncle         skin     Allergies   Allergen Reactions    Macrobid [Nitrofurantoin Monohyd/M-Cryst] Unknown (comments)    Tetracycline Unknown (comments)        Prior to Admission medications    Medication Sig Start Date End Date Taking? Authorizing Provider   tadalafil (CIALIS) 5 mg tablet TAKE 1 TABLET daily 19   Karlene Castillo MD   albuterol (PROVENTIL HFA, VENTOLIN HFA, PROAIR HFA) 90 mcg/actuation inhaler Take 2 Puffs by inhalation every four (4) hours as needed for Wheezing.  18   Karlene Castillo MD   lisinopril (PRINIVIL, ZESTRIL) 20 mg tablet TAKE 1 TABLET DAILY 12/18/18   Fabiola Bermeo MD   pravastatin (PRAVACHOL) 40 mg tablet TAKE 1 TABLET DAILY 12/5/18   Fabiola Bermeo MD   aspirin 81 mg chewable tablet Take 1 Tab by mouth daily. 9/14/17   Fabiola Bermeo MD       REVIEW OF SYSTEMS:     I am not able to complete the review of systems because:    The patient is intubated and sedated    The patient has altered mental status due to his acute medical problems    The patient has baseline aphasia from prior stroke(s)    The patient has baseline dementia and is not reliable historian    The patient is in acute medical distress and unable to provide information           Total of 12 systems reviewed as follows:       POSITIVE= underlined text  Negative = text not underlined  General:  fever, chills, sweats, generalized weakness, weight loss/gain,      loss of appetite   Eyes:    blurred vision, eye pain, loss of vision, double vision  ENT:    rhinorrhea, pharyngitis   Respiratory:   cough, sputum production, SOB, ROPER, wheezing, pleuritic pain   Cardiology:   chest pain, palpitations, orthopnea, PND, edema, syncope   Gastrointestinal:  abdominal pain , N/V, diarrhea, dysphagia, constipation, bleeding   Genitourinary:  frequency, urgency, dysuria, hematuria, incontinence   Muskuloskeletal :  arthralgia, myalgia, back pain  Hematology:  easy bruising, nose or gum bleeding, lymphadenopathy   Dermatological: rash, ulceration, pruritis, color change / jaundice  Endocrine:   hot flashes or polydipsia   Neurological:  headache, dizziness, confusion, focal weakness, paresthesia,     Speech difficulties, memory loss, gait difficulty  Psychological: Feelings of anxiety, depression, agitation    Objective:   VITALS:    Visit Vitals  BP (!) 179/93 (BP 1 Location: Right arm, BP Patient Position: At rest)   Pulse 69   Temp 98 °F (36.7 °C)   Resp 16   Ht 5' 10\" (1.778 m)   Wt 76 kg (167 lb 8.8 oz)   SpO2 97%   BMI 24.04 kg/m²       PHYSICAL EXAM:    General:    Alert, cooperative, no distress, appears stated age. HEENT: Atraumatic, anicteric sclerae, pink conjunctivae     No oral ulcers, mucosa moist, throat clear, dentition fair  Neck:  Supple, symmetrical,  thyroid: non tender  Lungs:   Coarse BS, rhonchi  Chest wall:  No tenderness  No Accessory muscle use. Heart:   Regular  rhythm,  No  murmur   No edema  Abdomen:   Soft, non-tender. Not distended. Bowel sounds normal  Extremities: No cyanosis. No clubbing,      Skin turgor normal, Capillary refill normal, Radial  pulse 2+  Skin:     Not pale. Not Jaundiced  No rashes   Psych:  Good insight. Not depressed. Not anxious or agitated. Neurologic: EOMs intact. No facial asymmetry. No aphasia or slurred speech. Symmetrical strength, Sensation grossly intact. Alert and oriented X 4.     _______________________________________________________________________  Care Plan discussed with:    Comments   Patient y    Family  y wife   RN y    Care Manager                    Consultant:      _______________________________________________________________________  Expected  Disposition:   Home with Family y   HH/PT/OT/RN    SNF/LTC    KERRY    ________________________________________________________________________  TOTAL TIME:  [address] Minutes    Critical Care Provided     Minutes non procedure based      Comments    y Reviewed previous records   >50% of visit spent in counseling and coordination of care y Discussion with patient and/or family and questions answered       ________________________________________________________________________  Signed: Jonn Bull MD    Procedures: see electronic medical records for all procedures/Xrays and details which were not copied into this note but were reviewed prior to creation of Plan.     LAB DATA REVIEWED:    Recent Results (from the past 24 hour(s))   CBC WITH AUTOMATED DIFF    Collection Time: 01/01/20  2:43 PM   Result Value Ref Range    WBC 6.5 4.1 - 11.1 K/uL    RBC 4.72 4.10 - 5.70 M/uL    HGB 14.4 12.1 - 17.0 g/dL    HCT 43.4 36.6 - 50.3 %    MCV 91.9 80.0 - 99.0 FL    MCH 30.5 26.0 - 34.0 PG    MCHC 33.2 30.0 - 36.5 g/dL    RDW 13.2 11.5 - 14.5 %    PLATELET 931 844 - 759 K/uL    MPV 9.6 8.9 - 12.9 FL    NRBC 0.0 0  WBC    ABSOLUTE NRBC 0.00 0.00 - 0.01 K/uL    NEUTROPHILS 56 32 - 75 %    LYMPHOCYTES 29 12 - 49 %    MONOCYTES 9 5 - 13 %    EOSINOPHILS 5 0 - 7 %    BASOPHILS 1 0 - 1 %    IMMATURE GRANULOCYTES 0 0.0 - 0.5 %    ABS. NEUTROPHILS 3.7 1.8 - 8.0 K/UL    ABS. LYMPHOCYTES 1.9 0.8 - 3.5 K/UL    ABS. MONOCYTES 0.6 0.0 - 1.0 K/UL    ABS. EOSINOPHILS 0.3 0.0 - 0.4 K/UL    ABS. BASOPHILS 0.1 0.0 - 0.1 K/UL    ABS. IMM. GRANS. 0.0 0.00 - 0.04 K/UL    DF AUTOMATED     METABOLIC PANEL, COMPREHENSIVE    Collection Time: 01/01/20  2:43 PM   Result Value Ref Range    Sodium 140 136 - 145 mmol/L    Potassium 3.7 3.5 - 5.1 mmol/L    Chloride 110 (H) 97 - 108 mmol/L    CO2 25 21 - 32 mmol/L    Anion gap 5 5 - 15 mmol/L    Glucose 96 65 - 100 mg/dL    BUN 13 6 - 20 MG/DL    Creatinine 1.04 0.70 - 1.30 MG/DL    BUN/Creatinine ratio 13 12 - 20      GFR est AA >60 >60 ml/min/1.73m2    GFR est non-AA >60 >60 ml/min/1.73m2    Calcium 9.0 8.5 - 10.1 MG/DL    Bilirubin, total 0.2 0.2 - 1.0 MG/DL    ALT (SGPT) 29 12 - 78 U/L    AST (SGOT) 24 15 - 37 U/L    Alk.  phosphatase 122 (H) 45 - 117 U/L    Protein, total 7.2 6.4 - 8.2 g/dL    Albumin 3.7 3.5 - 5.0 g/dL    Globulin 3.5 2.0 - 4.0 g/dL    A-G Ratio 1.1 1.1 - 2.2     TROPONIN I    Collection Time: 01/01/20  2:43 PM   Result Value Ref Range    Troponin-I, Qt. <0.05 <0.05 ng/mL   GLUCOSE, POC    Collection Time: 01/01/20  2:51 PM   Result Value Ref Range    Glucose (POC) 79 65 - 100 mg/dL    Performed by Reilly Quintana ([address])    POC CREATININE    Collection Time: 01/01/20  3:00 PM   Result Value Ref Range    Creatinine (POC) 1.2 0.6 - 1.3 mg/dL    GFRAA, POC >60 >60 ml/min/1.73m2    GFRNA, POC 59 (L) >60 ml/min/1.73m2   URINALYSIS W/ RFLX MICROSCOPIC Collection Time: 01/01/20  3:33 PM   Result Value Ref Range    Color YELLOW/STRAW      Appearance CLEAR CLEAR      Specific gravity 1.011 1.003 - 1.030      pH (UA) 6.0 5.0 - 8.0      Protein NEGATIVE  NEG mg/dL    Glucose NEGATIVE  NEG mg/dL    Ketone NEGATIVE  NEG mg/dL    Bilirubin NEGATIVE  NEG      Blood NEGATIVE  NEG      Urobilinogen 0.2 0.2 - 1.0 EU/dL    Nitrites NEGATIVE  NEG      Leukocyte Esterase NEGATIVE  NEG

## 2020-01-01 NOTE — ED PROVIDER NOTES
EMERGENCY DEPARTMENT HISTORY AND PHYSICAL EXAM      Date: 1/1/2020  Patient Name: Arya Maldonado    History of Presenting Illness     Chief Complaint   Patient presents with    Leg Problem     Left leg heaviness starting 20 min PTA. Pt reports two other episodes this week. Pt took two regular aspirins at home       History Provided By: Patient    HPI: Arya Maldonado, 76 y.o. male presents to the ED with cc of left leg weakness. The patient states this is his third episode of left leg weakness this week. The symptoms started 20 minutes prior to arrival.  Symptoms did include tingling in the left arm and left leg, but that has resolved. The patient denies headache, chest pain, back pain or trauma. He has had 2 additional episodes similar to this earlier in the week. Each episode lasted for 30 minutes before it resolved. There are no other complaints, changes, or physical findings at this time. PCP: Damián Mays MD    No current facility-administered medications on file prior to encounter. Current Outpatient Medications on File Prior to Encounter   Medication Sig Dispense Refill    tadalafil (CIALIS) 5 mg tablet TAKE 1 TABLET daily 30 Tab 5    albuterol (PROVENTIL HFA, VENTOLIN HFA, PROAIR HFA) 90 mcg/actuation inhaler Take 2 Puffs by inhalation every four (4) hours as needed for Wheezing. 1 Inhaler 0    lisinopril (PRINIVIL, ZESTRIL) 20 mg tablet TAKE 1 TABLET DAILY 90 Tab 3    pravastatin (PRAVACHOL) 40 mg tablet TAKE 1 TABLET DAILY 90 Tab 3    aspirin 81 mg chewable tablet Take 1 Tab by mouth daily.  27 Tab 11       Past History     Past Medical History:  Past Medical History:   Diagnosis Date    Arthritis     knee    Cancer ([facility] [address]     skin    Chronic obstructive pulmonary disease ([facility] [address]     Hypercholesterolemia     Hypertension     Ill-defined condition     lipids    Smoking 1/22/2010    Stopped 04/2000        Past Surgical History:  Past Surgical History:   Procedure Laterality Date    COLORECTAL SCRN; HI RISK IND  2016         ENDOSCOPY, COLON, DIAGNOSTIC  2007    Dr Princess Dunn polyp repeat 2010    HX ORTHOPAEDIC Right 3/2016    arthroscopic; torn medial meniscus       Family History:  Family History   Problem Relation Age of Onset    Hypertension Mother     Thyroid Disease Mother     Kidney Disease Father     Cancer Father         skin    Diabetes Father     Cancer Paternal Aunt         lung    Cancer Paternal Uncle         skin       Social History:  Social History     Tobacco Use    Smoking status: Former Smoker     Packs/day: 2.00     Years: 40.00     Pack years: 80.00     Last attempt to quit: 2000     Years since quittin.7    Smokeless tobacco: Never Used   Substance Use Topics    Alcohol use: Yes     Alcohol/week: 0.0 standard drinks     Types: 2 - 3 Cans of beer per week     Comment: Soc    Drug use: No       Allergies: Allergies   Allergen Reactions    Macrobid [Nitrofurantoin Monohyd/M-Cryst] Unknown (comments)    Tetracycline Unknown (comments)         Review of Systems   Review of Systems   Constitutional: Negative for fever. HENT: Negative for congestion. Eyes: Negative. Respiratory: Negative for shortness of breath. Cardiovascular: Negative for chest pain. Gastrointestinal: Negative for abdominal pain. Endocrine: Negative for heat intolerance. Genitourinary: Negative. Musculoskeletal: Negative for back pain. Skin: Negative for rash. Allergic/Immunologic: Negative for immunocompromised state. Neurological: Positive for weakness and numbness. Negative for headaches. Hematological: Does not bruise/bleed easily. Psychiatric/Behavioral: Negative. All other systems reviewed and are negative. Physical Exam   Physical Exam  Vitals signs and nursing note reviewed. Constitutional:       General: He is not in acute distress. Appearance: He is well-developed.    HENT:      Head: Normocephalic and atraumatic. Eyes:      Extraocular Movements: Extraocular movements intact. Pupils: Pupils are equal, round, and reactive to light. Neck:      Musculoskeletal: Normal range of motion and neck supple. Cardiovascular:      Rate and Rhythm: Normal rate and regular rhythm. Heart sounds: Normal heart sounds. Pulmonary:      Effort: Pulmonary effort is normal.      Breath sounds: Normal breath sounds. No wheezing. Abdominal:      General: Bowel sounds are normal.      Palpations: Abdomen is soft. Tenderness: There is no tenderness. Musculoskeletal: Normal range of motion. General: No tenderness. Skin:     General: Skin is warm and dry. Neurological:      General: No focal deficit present. Mental Status: He is alert and oriented to person, place, and time. Cranial Nerves: No cranial nerve deficit. Psychiatric:         Mood and Affect: Mood normal.         Behavior: Behavior normal.         Diagnostic Study Results     Labs -     Recent Results (from the past 12 hour(s))   CBC WITH AUTOMATED DIFF    Collection Time: 01/01/20  2:43 PM   Result Value Ref Range    WBC 6.5 4.1 - 11.1 K/uL    RBC 4.72 4.10 - 5.70 M/uL    HGB 14.4 12.1 - 17.0 g/dL    HCT 43.4 36.6 - 50.3 %    MCV 91.9 80.0 - 99.0 FL    MCH 30.5 26.0 - 34.0 PG    MCHC 33.2 30.0 - 36.5 g/dL    RDW 13.2 11.5 - 14.5 %    PLATELET 857 081 - 266 K/uL    MPV 9.6 8.9 - 12.9 FL    NRBC 0.0 0  WBC    ABSOLUTE NRBC 0.00 0.00 - 0.01 K/uL    NEUTROPHILS 56 32 - 75 %    LYMPHOCYTES 29 12 - 49 %    MONOCYTES 9 5 - 13 %    EOSINOPHILS 5 0 - 7 %    BASOPHILS 1 0 - 1 %    IMMATURE GRANULOCYTES 0 0.0 - 0.5 %    ABS. NEUTROPHILS 3.7 1.8 - 8.0 K/UL    ABS. LYMPHOCYTES 1.9 0.8 - 3.5 K/UL    ABS. MONOCYTES 0.6 0.0 - 1.0 K/UL    ABS. EOSINOPHILS 0.3 0.0 - 0.4 K/UL    ABS. BASOPHILS 0.1 0.0 - 0.1 K/UL    ABS. IMM.  GRANS. 0.0 0.00 - 0.04 K/UL    DF AUTOMATED     METABOLIC PANEL, COMPREHENSIVE    Collection Time: 01/01/20  2:43 PM Result Value Ref Range    Sodium 140 136 - 145 mmol/L    Potassium 3.7 3.5 - 5.1 mmol/L    Chloride 110 (H) 97 - 108 mmol/L    CO2 25 21 - 32 mmol/L    Anion gap 5 5 - 15 mmol/L    Glucose 96 65 - 100 mg/dL    BUN 13 6 - 20 MG/DL    Creatinine 1.04 0.70 - 1.30 MG/DL    BUN/Creatinine ratio 13 12 - 20      GFR est AA >60 >60 ml/min/1.73m2    GFR est non-AA >60 >60 ml/min/1.73m2    Calcium 9.0 8.5 - 10.1 MG/DL    Bilirubin, total 0.2 0.2 - 1.0 MG/DL    ALT (SGPT) 29 12 - 78 U/L    AST (SGOT) 24 15 - 37 U/L    Alk. phosphatase 122 (H) 45 - 117 U/L    Protein, total 7.2 6.4 - 8.2 g/dL    Albumin 3.7 3.5 - 5.0 g/dL    Globulin 3.5 2.0 - 4.0 g/dL    A-G Ratio 1.1 1.1 - 2.2     TROPONIN I    Collection Time: 01/01/20  2:43 PM   Result Value Ref Range    Troponin-I, Qt. <0.05 <0.05 ng/mL   GLUCOSE, POC    Collection Time: 01/01/20  2:51 PM   Result Value Ref Range    Glucose (POC) 79 65 - 100 mg/dL    Performed by Whitley Velez ([employer])    POC CREATININE    Collection Time: 01/01/20  3:00 PM   Result Value Ref Range    Creatinine (POC) 1.2 0.6 - 1.3 mg/dL    GFRAA, POC >60 >60 ml/min/1.73m2    GFRNA, POC 59 (L) >60 ml/min/1.73m2   URINALYSIS W/ RFLX MICROSCOPIC    Collection Time: 01/01/20  3:33 PM   Result Value Ref Range    Color YELLOW/STRAW      Appearance CLEAR CLEAR      Specific gravity 1.011 1.003 - 1.030      pH (UA) 6.0 5.0 - 8.0      Protein NEGATIVE  NEG mg/dL    Glucose NEGATIVE  NEG mg/dL    Ketone NEGATIVE  NEG mg/dL    Bilirubin NEGATIVE  NEG      Blood NEGATIVE  NEG      Urobilinogen 0.2 0.2 - 1.0 EU/dL    Nitrites NEGATIVE  NEG      Leukocyte Esterase NEGATIVE  NEG     SED RATE (ESR)    Collection Time: 01/01/20  6:15 PM   Result Value Ref Range    Sed rate, automated 6 0 - 20 mm/hr   TSH 3RD GENERATION    Collection Time: 01/01/20  6:15 PM   Result Value Ref Range    TSH 1.19 0.36 - 3.74 uIU/mL       Radiologic Studies -   XR CHEST PORT   Final Result   IMPRESSION: No acute cardiopulmonary disease. CTA HEAD NECK W CONT         CT CODE NEURO HEAD WO CONTRAST   Final Result   IMPRESSION: No acute intracranial hemorrhage, mass or infarct. MRI BRAIN WO CONT    (Results Pending)     CT Results  (Last 48 hours)    None        CXR Results  (Last 48 hours)    None          Medical Decision Making   I am the first provider for this patient. I reviewed the vital signs, available nursing notes, past medical history, past surgical history, family history and social history. Vital Signs-Reviewed the patient's vital signs. Patient Vitals for the past 12 hrs:   Temp Pulse Resp BP SpO2   01/01/20 1407 98 °F (36.7 °C) 69 16 (!) 179/93 97 %       EKG interpretation: (Preliminary)  Rhythm: normal sinus rhythm; and regular . Rate (approx.): 64; Axis: normal; [place] interval: normal; QRS interval: normal ; ST/T wave: normal; Other findings: unchanged from previous ekg. Records Reviewed: Nursing Notes, Old Medical Records, Previous electrocardiograms, Previous Radiology Studies and Previous Laboratory Studies    Provider Notes (Medical Decision Making):   TIA, CVA, electrolyte abnormality, dehydration    ED Course:   Initial assessment performed. The patients presenting problems have been discussed, and they are in agreement with the care plan formulated and outlined with them. I have encouraged them to ask questions as they arise throughout their visit. Consult note:    I spoke to Dr. Fantasma Molina, tele-neurology. He agrees that the patient probably had a TIA. He states that if the CT is negative, he should receive a loading dose of 300 mg of Plavix, 325 mg of aspirin, a CTA, saline and further work-up. He will evaluate the patient. Consult note: The patient is being admitted by Dr. Jim Champagne, hospitalist       Critical Care Time:   0    Disposition:  Admit    PLAN:  1. Current Discharge Medication List        2.    Follow-up Information    None       Return to ED if worse     Diagnosis     Clinical Impression:   1. TIA (transient ischemic attack)    2. Hypertension, essential        Attestations:    Penelope Sherwood MD    Please note that this dictation was completed with [employer], the computer voice recognition software. Quite often unanticipated grammatical, syntax, homophones, and other interpretive errors are inadvertently transcribed by the computer software. Please disregard these errors. Please excuse any errors that have escaped final proofreading. Thank you.

## 2020-01-01 NOTE — ED NOTES
TRANSFER - OUT REPORT:    Verbal report given to Peris RN(name) on Zelalem Peralta  being transferred to Neuro(unit) for routine progression of care       Report consisted of patients Situation, Background, Assessment and   Recommendations(SBAR). Information from the following report(s) SBAR, Kardex, ED Summary, [facility] and Recent Results was reviewed with the receiving nurse. Lines:   Peripheral IV 01/01/20 Right Antecubital (Active)   Site Assessment Clean, dry, & intact 1/1/2020  3:48 PM   Phlebitis Assessment 0 1/1/2020  3:48 PM   Infiltration Assessment 0 1/1/2020  3:48 PM   Dressing Status Clean, dry, & intact 1/1/2020  3:48 PM   Dressing Type Transparent 1/1/2020  3:48 PM   Hub Color/Line Status Pink 1/1/2020  3:48 PM        Opportunity for questions and clarification was provided.       Patient transported with:   Unsocial

## 2020-01-02 ENCOUNTER — APPOINTMENT (OUTPATIENT)
Dept: VASCULAR SURGERY | Age: 75
DRG: 948 | End: 2020-01-02
Attending: INTERNAL MEDICINE
Payer: MEDICARE

## 2020-01-02 ENCOUNTER — APPOINTMENT (OUTPATIENT)
Dept: MRI IMAGING | Age: 75
DRG: 948 | End: 2020-01-02
Attending: INTERNAL MEDICINE
Payer: MEDICARE

## 2020-01-02 ENCOUNTER — APPOINTMENT (OUTPATIENT)
Dept: NON INVASIVE DIAGNOSTICS | Age: 75
DRG: 948 | End: 2020-01-02
Attending: INTERNAL MEDICINE
Payer: MEDICARE

## 2020-01-02 VITALS
SYSTOLIC BLOOD PRESSURE: 125 MMHG | RESPIRATION RATE: 18 BRPM | TEMPERATURE: 98 F | BODY MASS INDEX: 23.99 KG/M2 | DIASTOLIC BLOOD PRESSURE: 86 MMHG | HEART RATE: 61 BPM | WEIGHT: 167.55 LBS | HEIGHT: 70 IN | OXYGEN SATURATION: 98 %

## 2020-01-02 PROBLEM — G45.1 TRANSIENT ISCHEMIC ATTACK INVOLVING RIGHT INTERNAL CAROTID ARTERY: Status: ACTIVE | Noted: 2020-01-02

## 2020-01-02 PROBLEM — I65.23 BILATERAL CAROTID ARTERY STENOSIS: Status: ACTIVE | Noted: 2020-01-02

## 2020-01-02 PROBLEM — R41.3 DISTURBANCE OF MEMORY: Status: ACTIVE | Noted: 2020-01-02

## 2020-01-02 LAB
25(OH)D3 SERPL-MCNC: 30.5 NG/ML (ref 30–100)
ALBUMIN SERPL-MCNC: 3.2 G/DL (ref 3.5–5)
ALBUMIN/GLOB SERPL: 1 {RATIO} (ref 1.1–2.2)
ALP SERPL-CCNC: 106 U/L (ref 45–117)
ALT SERPL-CCNC: 27 U/L (ref 12–78)
ANION GAP SERPL CALC-SCNC: 5 MMOL/L (ref 5–15)
AST SERPL-CCNC: 22 U/L (ref 15–37)
ATRIAL RATE: 59 BPM
ATRIAL RATE: 64 BPM
AV VELOCITY RATIO: 0.73
BASOPHILS # BLD: 0.1 K/UL (ref 0–0.1)
BASOPHILS NFR BLD: 1 % (ref 0–1)
BILIRUB SERPL-MCNC: 0.7 MG/DL (ref 0.2–1)
BUN SERPL-MCNC: 13 MG/DL (ref 6–20)
BUN/CREAT SERPL: 12 (ref 12–20)
CALCIUM SERPL-MCNC: 8.3 MG/DL (ref 8.5–10.1)
CALCULATED P AXIS, ECG09: 61 DEGREES
CALCULATED P AXIS, ECG09: 77 DEGREES
CALCULATED R AXIS, ECG10: 53 DEGREES
CALCULATED R AXIS, ECG10: 69 DEGREES
CALCULATED T AXIS, ECG11: 59 DEGREES
CALCULATED T AXIS, ECG11: 69 DEGREES
CHLORIDE SERPL-SCNC: 112 MMOL/L (ref 97–108)
CO2 SERPL-SCNC: 25 MMOL/L (ref 21–32)
CREAT SERPL-MCNC: 1.1 MG/DL (ref 0.7–1.3)
DIAGNOSIS, 93000: NORMAL
DIAGNOSIS, 93000: NORMAL
DIFFERENTIAL METHOD BLD: NORMAL
ECHO AO ROOT DIAM: 3.66 CM
ECHO AV AREA PEAK VELOCITY: 2.5 CM2
ECHO AV AREA/BSA PEAK VELOCITY: 1.3 CM2/M2
ECHO AV PEAK GRADIENT: 3.9 MMHG
ECHO AV PEAK VELOCITY: 98.98 CM/S
ECHO EST RA PRESSURE: 10 MMHG
ECHO LA AREA 4C: 9.4 CM2
ECHO LA MAJOR AXIS: 1.94 CM
ECHO LA TO AORTIC ROOT RATIO: 0.53
ECHO LA VOL 4C: 17.72 ML (ref 18–58)
ECHO LA VOLUME INDEX A4C: 8.92 ML/M2 (ref 16–28)
ECHO LV E' LATERAL VELOCITY: 11.44 CM/S
ECHO LV E' SEPTAL VELOCITY: 7.8 CM/S
ECHO LV EDV TEICHHOLZ: 0.4 ML
ECHO LV ESV TEICHHOLZ: 0.24 ML
ECHO LV INTERNAL DIMENSION DIASTOLIC: 4.02 CM (ref 4.2–5.9)
ECHO LV INTERNAL DIMENSION SYSTOLIC: 3.26 CM
ECHO LV IVSD: 0.83 CM (ref 0.6–1)
ECHO LV MASS 2D: 128.6 G (ref 88–224)
ECHO LV MASS INDEX 2D: 64.7 G/M2 (ref 49–115)
ECHO LV POSTERIOR WALL DIASTOLIC: 1.01 CM (ref 0.6–1)
ECHO LV POSTERIOR WALL SYSTOLIC: 0.89 CM
ECHO LVOT DIAM: 2.09 CM
ECHO LVOT PEAK GRADIENT: 2.1 MMHG
ECHO LVOT PEAK VELOCITY: 72.13 CM/S
ECHO LVOT SV: 50.4 ML
ECHO LVOT VTI: 14.77 CM
ECHO MV A VELOCITY: 76.31 CM/S
ECHO MV E DECELERATION TIME (DT): 267.5 MS
ECHO MV E VELOCITY: 58.47 CM/S
ECHO MV E/A RATIO: 0.77
ECHO MV E/E' LATERAL: 5.11
ECHO MV E/E' RATIO (AVERAGED): 6.3
ECHO MV E/E' SEPTAL: 7.5
ECHO MV REGURGITANT PEAK GRADIENT: 3.1 MMHG
ECHO MV REGURGITANT PEAK VELOCITY: 88.29 CM/S
ECHO PULMONARY ARTERY SYSTOLIC PRESSURE (PASP): 34.2 MMHG
ECHO RIGHT VENTRICULAR SYSTOLIC PRESSURE (RVSP): 34.2 MMHG
ECHO TV REGURGITANT MAX VELOCITY: 246.12 CM/S
ECHO TV REGURGITANT PEAK GRADIENT: 24.2 MMHG
EOSINOPHIL # BLD: 0.4 K/UL (ref 0–0.4)
EOSINOPHIL NFR BLD: 6 % (ref 0–7)
ERYTHROCYTE [DISTWIDTH] IN BLOOD BY AUTOMATED COUNT: 13.3 % (ref 11.5–14.5)
ERYTHROCYTE [SEDIMENTATION RATE] IN BLOOD: 9 MM/HR (ref 0–20)
FOLATE SERPL-MCNC: 19.6 NG/ML (ref 5–21)
GLOBULIN SER CALC-MCNC: 3.2 G/DL (ref 2–4)
GLUCOSE BLD STRIP.AUTO-MCNC: 113 MG/DL (ref 65–100)
GLUCOSE SERPL-MCNC: 121 MG/DL (ref 65–100)
HCT VFR BLD AUTO: 43.7 % (ref 36.6–50.3)
HGB BLD-MCNC: 14.2 G/DL (ref 12.1–17)
IMM GRANULOCYTES # BLD AUTO: 0 K/UL (ref 0–0.04)
IMM GRANULOCYTES NFR BLD AUTO: 0 % (ref 0–0.5)
LEFT CCA DIST DIAS: 21.7 CM/S
LEFT CCA DIST SYS: 65.6 CM/S
LEFT CCA PROX DIAS: 19.9 CM/S
LEFT CCA PROX SYS: 65.6 CM/S
LEFT ECA DIAS: 10.8 CM/S
LEFT ECA SYS: 63.7 CM/S
LEFT ICA DIST DIAS: 27.2 CM/S
LEFT ICA DIST SYS: 96.6 CM/S
LEFT ICA MID DIAS: 34.5 CM/S
LEFT ICA MID SYS: 100.3 CM/S
LEFT ICA PROX DIAS: 38.2 CM/S
LEFT ICA PROX SYS: 105.8 CM/S
LEFT ICA/CCA SYS: 1.6
LEFT SUBCLAVIAN DIAS: 0 CM/S
LEFT SUBCLAVIAN SYS: 118.5 CM/S
LEFT VERTEBRAL DIAS: 12.6 CM/S
LEFT VERTEBRAL SYS: 40 CM/S
LVFS 2D: 18.78 %
LVOT MG: 0.95 MMHG
LVOT MV: 0.45 CM/S
LVSV (TEICH): 14.38 ML
LYMPHOCYTES # BLD: 1.6 K/UL (ref 0.8–3.5)
LYMPHOCYTES NFR BLD: 26 % (ref 12–49)
MAGNESIUM SERPL-MCNC: 2.2 MG/DL (ref 1.6–2.4)
MCH RBC QN AUTO: 30.4 PG (ref 26–34)
MCHC RBC AUTO-ENTMCNC: 32.5 G/DL (ref 30–36.5)
MCV RBC AUTO: 93.6 FL (ref 80–99)
MONOCYTES # BLD: 0.6 K/UL (ref 0–1)
MONOCYTES NFR BLD: 9 % (ref 5–13)
MV DEC SLOPE: 2.19
NEUTS SEG # BLD: 3.7 K/UL (ref 1.8–8)
NEUTS SEG NFR BLD: 58 % (ref 32–75)
NRBC # BLD: 0 K/UL (ref 0–0.01)
NRBC BLD-RTO: 0 PER 100 WBC
P-R INTERVAL, ECG05: 194 MS
P-R INTERVAL, ECG05: 200 MS
PLATELET # BLD AUTO: 233 K/UL (ref 150–400)
PMV BLD AUTO: 9.7 FL (ref 8.9–12.9)
POTASSIUM SERPL-SCNC: 3.6 MMOL/L (ref 3.5–5.1)
PROT SERPL-MCNC: 6.4 G/DL (ref 6.4–8.2)
Q-T INTERVAL, ECG07: 404 MS
Q-T INTERVAL, ECG07: 408 MS
QRS DURATION, ECG06: 74 MS
QRS DURATION, ECG06: 78 MS
QTC CALCULATION (BEZET), ECG08: 399 MS
QTC CALCULATION (BEZET), ECG08: 420 MS
RBC # BLD AUTO: 4.67 M/UL (ref 4.1–5.7)
RIGHT CCA DIST DIAS: 15.8 CM/S
RIGHT CCA DIST SYS: 51.5 CM/S
RIGHT CCA PROX DIAS: 15.1 CM/S
RIGHT CCA PROX SYS: 63.4 CM/S
RIGHT ECA DIAS: 8.6 CM/S
RIGHT ECA SYS: 72.2 CM/S
RIGHT ICA DIST DIAS: 21.7 CM/S
RIGHT ICA DIST SYS: 85.4 CM/S
RIGHT ICA MID DIAS: 48.1 CM/S
RIGHT ICA MID SYS: 118.3 CM/S
RIGHT ICA PROX DIAS: 22 CM/S
RIGHT ICA PROX SYS: 55.1 CM/S
RIGHT ICA/CCA SYS: 2.3
RIGHT SUBCLAVIAN DIAS: 0 CM/S
RIGHT SUBCLAVIAN SYS: 97.1 CM/S
RIGHT VERTEBRAL DIAS: 12.2 CM/S
RIGHT VERTEBRAL SYS: 44.1 CM/S
SERVICE CMNT-IMP: ABNORMAL
SODIUM SERPL-SCNC: 142 MMOL/L (ref 136–145)
VENTRICULAR RATE, ECG03: 59 BPM
VENTRICULAR RATE, ECG03: 64 BPM
VIT B12 SERPL-MCNC: 176 PG/ML (ref 193–986)
WBC # BLD AUTO: 6.3 K/UL (ref 4.1–11.1)

## 2020-01-02 PROCEDURE — 97165 OT EVAL LOW COMPLEX 30 MIN: CPT

## 2020-01-02 PROCEDURE — 82746 ASSAY OF FOLIC ACID SERUM: CPT

## 2020-01-02 PROCEDURE — 82306 VITAMIN D 25 HYDROXY: CPT

## 2020-01-02 PROCEDURE — 70551 MRI BRAIN STEM W/O DYE: CPT

## 2020-01-02 PROCEDURE — 93880 EXTRACRANIAL BILAT STUDY: CPT

## 2020-01-02 PROCEDURE — 83735 ASSAY OF MAGNESIUM: CPT

## 2020-01-02 PROCEDURE — 74011250637 HC RX REV CODE- 250/637: Performed by: INTERNAL MEDICINE

## 2020-01-02 PROCEDURE — 85652 RBC SED RATE AUTOMATED: CPT

## 2020-01-02 PROCEDURE — 36415 COLL VENOUS BLD VENIPUNCTURE: CPT

## 2020-01-02 PROCEDURE — 82607 VITAMIN B-12: CPT

## 2020-01-02 PROCEDURE — 82962 GLUCOSE BLOOD TEST: CPT

## 2020-01-02 PROCEDURE — 86038 ANTINUCLEAR ANTIBODIES: CPT

## 2020-01-02 PROCEDURE — 85025 COMPLETE CBC W/AUTO DIFF WBC: CPT

## 2020-01-02 PROCEDURE — 93306 TTE W/DOPPLER COMPLETE: CPT

## 2020-01-02 PROCEDURE — 97161 PT EVAL LOW COMPLEX 20 MIN: CPT

## 2020-01-02 PROCEDURE — 93005 ELECTROCARDIOGRAM TRACING: CPT

## 2020-01-02 PROCEDURE — 97116 GAIT TRAINING THERAPY: CPT

## 2020-01-02 PROCEDURE — 80053 COMPREHEN METABOLIC PANEL: CPT

## 2020-01-02 RX ORDER — CLOPIDOGREL BISULFATE 75 MG/1
75 TABLET ORAL DAILY
Qty: 30 TAB | Refills: 1 | Status: SHIPPED | OUTPATIENT
Start: 2020-01-03 | End: 2020-01-06 | Stop reason: SDUPTHER

## 2020-01-02 RX ORDER — ATORVASTATIN CALCIUM 40 MG/1
40 TABLET, FILM COATED ORAL
Status: DISCONTINUED | OUTPATIENT
Start: 2020-01-02 | End: 2020-01-02 | Stop reason: HOSPADM

## 2020-01-02 RX ORDER — ATORVASTATIN CALCIUM 40 MG/1
40 TABLET, FILM COATED ORAL
Qty: 30 TAB | Refills: 1 | Status: SHIPPED | OUTPATIENT
Start: 2020-01-02 | End: 2020-01-06 | Stop reason: SDUPTHER

## 2020-01-02 RX ADMIN — ASPIRIN 81 MG 81 MG: 81 TABLET ORAL at 08:21

## 2020-01-02 RX ADMIN — LISINOPRIL 20 MG: 20 TABLET ORAL at 08:21

## 2020-01-02 RX ADMIN — CLOPIDOGREL BISULFATE 75 MG: 75 TABLET ORAL at 08:21

## 2020-01-02 NOTE — PROGRESS NOTES
Speech pathology  Orders received, chart reviewed, spoke with nursing and patient's wife bedside. CT negative and MRI pending. Patient in with 3 episodes of left leg weakness in the past week that lasted 20-30 minutes each. Patient passed STAND and has been tolerating a regular diet/ thin liquids. No reports of any changes in speech/language. Formal evaluation not indicated at this time. Will complete orders.  Asad Coronel M.S. CCC-SLP

## 2020-01-02 NOTE — PROGRESS NOTES
TRANSFER - IN REPORT:    Verbal report received from [facility] (name) on Lady Munoz  being received from ED (unit) for routine progression of care      Report consisted of patients Situation, Background, Assessment and   Recommendations(SBAR). Information from the following report(s) SBAR, Kardex, ED Summary, Intake/Output, MAR and Recent Results was reviewed with the receiving nurse. Opportunity for questions and clarification was provided. Assessment completed upon patients arrival to unit and care assumed.

## 2020-01-02 NOTE — CONSULTS
Dictated: Patient is a 71-year-old male with 3 episodes in the last week of numbness of his left leg, lasting about 15 to 20 minutes, associated with some mild weakness, and then some numbness slight weakness in his left arm also, but no facial involvement or other cranial nerve involvement, denies any chest pain palpitations, denies any other cause for her symptoms. MRI is negative, CTA of head neck is basically negative for mild arteriosclerotic disease of the bifurcations, and Dopplers showed less than 50% disease, and patient on aspirin therapy so we switched to Plavix, and if his cardiac evaluation is negative he could probably go home today to be followed up in stroke clinic in 2 to 4 weeks. Consult  REFERRED BY:  Cory Ayala MD    CHIEF COMPLAINT: Numbness of the left side      Subjective: Betsy Glass is a 76 y.o. right-handed  male seen as a new patient to me, at the request of Dr. Fuad Ferrari of new problem of having 3 episodes of numbness that occurred on his left leg, with some involvement in the left arm, to the point that the patient could not walk for about 15 to 20 minutes each time, and has slight difficulty with weakness in his arm on the left, but no right-sided symptoms, no visual symptoms, no headache, no chest pain no palpitations no other cardiac symptoms, no other warning precipitating episodes for this. There was no unusual physical activity associated with this activity, and no permanent residual.  Patient had a previous episode in 2017 at which time he had a negative MRI and MRA of the brain and carotid Dopplers were unremarkable, he was diagnosed as a TIA and treated with aspirin therapy. He has been on aspirin therapy since that time. He has been placed on Plavix in addition and we are asked to evaluate. Patient has had no recurrent symptoms since being admitted.   His MRI scan of the brain was basically normal except for mild white matter disease, and he had a CTA of the head neck that showed mild mixed plaquing at the bifurcations, and carotid Doppler study was done which was less than 50% disease bilaterally. The patient had no intracranial major stenosis other than mild atherosclerotic disease. His routine laboratory studies are unremarkable. He has seen just outside the Doppler lab and echo lab he just finished his echo. He does have some memory problems, but think there is only mild memory loss, we will check a B12 level and thyroid to make sure there is no other treatable cause of his symptoms and we can pursue further work-up for that if he desires, but right now he does not want anything else done. Past Medical History:   Diagnosis Date    Arthritis     knee    Cancer ([address]     skin    Chronic obstructive pulmonary disease [address] [address]     Hypercholesterolemia     Hypertension     Ill-defined condition     lipids    Smoking 2010    Stopped 2000       Past Surgical History:   Procedure Laterality Date    COLORECTAL SCRN; HI RISK IND  2016         ENDOSCOPY, COLON, DIAGNOSTIC  2007    Dr Pringle Form polyp repeat 2010    HX ORTHOPAEDIC Right 3/2016    arthroscopic; torn medial meniscus     Family History   Problem Relation Age of Onset    Hypertension Mother     Thyroid Disease Mother     Stroke Mother     Kidney Disease Father     Cancer Father         skin    Diabetes Father     Neuropathy Brother     Cancer Paternal Aunt         lung    Cancer Paternal Uncle         skin    Diabetes Maternal Grandmother     Hypertension Child       Social History     Tobacco Use    Smoking status: Former Smoker     Packs/day: 2.00     Years: 40.00     Pack years: 80.00     Last attempt to quit: 2000     Years since quittin.7    Smokeless tobacco: Never Used   Substance Use Topics    Alcohol use:  Yes     Alcohol/week: 0.0 standard drinks     Types: 2 - 3 Cans of beer per week     Comment: Soc         Current Facility-Administered Medications:     atorvastatin (LIPITOR) tablet 40 mg, 40 mg, Oral, QHS, Ayaz Barnhart MD    0.9% sodium chloride infusion, 50 mL/hr, IntraVENous, CONTINUOUS, Costa Floyd MD, Last Rate: 50 mL/hr at 01/01/20 1912, 50 mL/hr at 01/01/20 1912    lisinopril (PRINIVIL, ZESTRIL) tablet 20 mg, 20 mg, Oral, DAILY, Costa Floyd MD, 20 mg at 01/02/20 7453    clopidogrel (PLAVIX) tablet 75 mg, 75 mg, Oral, DAILY, Costa Floyd MD, 75 mg at 01/02/20 1186    labetalol (NORMODYNE;TRANDATE) injection 10 mg, 10 mg, IntraVENous, Q6H PRN, Costa Floyd MD    acetaminophen (TYLENOL) tablet 650 mg, 650 mg, Oral, Q4H PRN, Costa Floyd MD    [facility]ron [facility] injection 4 mg, 4 mg, IntraVENous, Q6H PRN, Costa Floyd MD    enoxaparin (LOVENOX) injection 40 mg, 40 mg, SubCUTAneous, Q24H, Costa Floyd MD, 40 mg at 01/01/20 1913    albuterol-ipratropium (DUO-NEB) 2.5 MG-0.5 MG/3 ML, 3 mL, Nebulization, Q6H PRN, Costa Floyd MD    Current Outpatient Medications:     [START ON 1/3/2020] clopidogrel (PLAVIX) 75 mg tab, Take 1 Tab by mouth daily. , Disp: 30 Tab, Rfl: 1    atorvastatin (LIPITOR) 40 mg tablet, Take 1 Tab by mouth nightly., Disp: 30 Tab, Rfl: 1    lisinopril (PRINIVIL, ZESTRIL) 20 mg tablet, TAKE 1 TABLET DAILY, Disp: 90 Tab, Rfl: 3    tadalafil (CIALIS) 5 mg tablet, TAKE 1 TABLET daily, Disp: 30 Tab, Rfl: 5    albuterol (PROVENTIL HFA, VENTOLIN HFA, PROAIR HFA) 90 mcg/actuation inhaler, Take 2 Puffs by inhalation every four (4) hours as needed for Wheezing., Disp: 1 Inhaler, Rfl: 0        Allergies   Allergen Reactions    Macrobid [Nitrofurantoin Monohyd/M-Cryst] Unknown (comments)    Tetracycline Unknown (comments)      MRI Results (most recent):  Results from Hospital Encounter encounter on 01/01/20   MRI BRAIN WO CONT    Narrative INDICATION:   cva     EXAMINATION:  MRI BRAIN WO CONTRAST    COMPARISON:  MRI July 6, 2017, CTA January 1, 2020    TECHNIQUE: Multiplanar multisequence acquisition without contrast of the brain. FINDINGS:      Ventricles:  Midline, no hydrocephalus. Brain Parenchyma/Brainstem:  Few scattered T2 hyperintensities in the  supratentorial white matter. No acute infarction. Intracranial Hemorrhage:  None. Basal Cisterns:  Normal.   Flow Voids:  Normal.  Additional Comments:  N/A. Impression IMPRESSION:  Minimal chronic white matter disease with no acute infarction. Results from [place] encounter on 01/01/20   MRI BRAIN WO CONT    Narrative INDICATION:   cva     EXAMINATION:  MRI BRAIN WO CONTRAST    COMPARISON:  MRI July 6, 2017, CTA January 1, 2020    TECHNIQUE:  Multiplanar multisequence acquisition without contrast of the brain. FINDINGS:      Ventricles:  Midline, no hydrocephalus. Brain Parenchyma/Brainstem:  Few scattered T2 hyperintensities in the  supratentorial white matter. No acute infarction. Intracranial Hemorrhage:  None. Basal Cisterns:  Normal.   Flow Voids:  Normal.  Additional Comments:  N/A. Impression IMPRESSION:  Minimal chronic white matter disease with no acute infarction. Review of Systems:  A comprehensive review of systems was negative except for: Constitutional: positive for fatigue and malaise  Musculoskeletal: positive for myalgias, arthralgias and stiff joints  Neurological: positive for paresthesia, coordination problems, gait problems and weakness  Behvioral/Psych: positive for Memory loss   Vitals:    01/02/20 0000 01/02/20 0353 01/02/20 0718 01/02/20 1132   BP:  158/79 154/85 125/86   Pulse: 60 60 63 61   Resp:  16 18 18   Temp:  97.6 °F (36.4 °C) 97.8 °F (36.6 °C) 98 °F (36.7 °C)   SpO2:  99% 99% 98%   Weight:       Height:         Objective:     I      NEUROLOGICAL EXAM:    Appearance: The patient is well developed, well nourished, provides a coherent history and is in no acute distress.    Mental Status: Oriented to time, place and person, and the president, cognitive function is normal and speech is fluent and no aphasia or dysarthria. Mood and affect appropriate. Cranial Nerves:   Intact visual fields. Fundi are benign, disc are flat, no lesions seen on funduscopy. SHAQUILLE, EOM's full, no nystagmus, no ptosis. Facial sensation is normal. Corneal reflexes are not tested. Facial movement is symmetric. Hearing is normal bilaterally. Palate is midline with normal sternocleidomastoid and trapezius muscles are normal. Tongue is midline. Neck without meningismus or bruits  Temporal arteries are not tender or enlarged  TMJ areas are not tender on palpation   Motor:  5/5 strength in upper and lower proximal and distal muscles. Normal bulk and tone. No fasciculations. Rapid alternating movement is symmetric and intact bilaterally   Reflexes:   Deep tendon reflexes 2+/4 and symmetrical.  No babinski or clonus present   Sensory:   Normal to touch, pinprick and vibration and temperature. DSS is intact   Gait:  Normal gait for patient's age. Tremor:   No tremor noted. Cerebellar:  No abnormal cerebellar signs present on Romberg and tandem testing and finger-nose-finger exam.   Neurovascular:  Normal heart sounds and regular rhythm, peripheral pulses intact, and no carotid bruits. Assessment:       ICD-10-CM ICD-9-CM    1. TIA (transient ischemic attack) G45.9 435.9    2. Hypertension, essential I10 401.9      Active Problems:    Hypercholesterolemia (1/22/2010)      Hypertension, essential (1/22/2010)      COPD, moderate ([address] (9/13/2014)      Overview: August 2014 PFT's: Moderate air flow obstruction. No restrictive lung       disease. Severe air trapping. Normal DLCO. Significant improvement on FEV1       after bronchodilators.             CVA (cerebral vascular accident) ([address] (1/1/2020)      Bilateral carotid artery stenosis (1/2/2020)      Transient ischemic attack involving right internal carotid artery (1/2/2020)      Disturbance of memory (1/2/2020)        Plan:     Patient with an episode of TIA, occurring 3 times, but work-up negative for any significant cerebrovascular disease that is surgical, and no other cardiac symptoms either. We will switch the patient to Plavix, and follow his clinical course. If his cardiac work-up is negative, he could be discharged possibly today. Patient advised that the 4 main risk factors for vascular disease are high blood pressure, cholesterol, poor diet, and smoking, and he is to make sure he alters all these tries to control his risk factors and do not smoke. Patient also advised that the warning signs of stroke could be covered by the acronym be fast, that is balance, eyes, face, arms, sensory, time  For his memory loss we will check a B12 level and thyroid, and work-up as ordered, and consider neuropsych testing as an outpatient when he follows up if he will agree.     Signed By: Nigel Alonso MD     January 2, 2020       CC: Cassie Rubio MD  FAX: [phone]

## 2020-01-02 NOTE — PROGRESS NOTES
Occupational Therapy:  01/02/20    Orders received, chart reviewed and patient evaluated by occupational therapy. Pending progression with skilled acute occupational therapy, recommend:  No skilled occupational therapy/ follow up rehabilitation needs identified at this time. Full evaluation to follow.      Thank you,  Denai Sampson OTR/L

## 2020-01-02 NOTE — PROGRESS NOTES

## 2020-01-02 NOTE — PROGRESS NOTES
Occupational Therapy:  01/02/20    Orders received and appreciated, chart reviewed. Patient currently GIBSON at MRI. Will follow up for OT evaluation.      Thank you,  Madalynn Bence, OTR/L

## 2020-01-02 NOTE — PROGRESS NOTES
Per patient- EKG was done in emergency room. No evidence in the chart or computer that test was completed. PCT completed EKG again and is now evident in the chart. **EKG reading is not transmitting to the computer. The hard copy is in the patient's chart.

## 2020-01-02 NOTE — PROGRESS NOTES
OCCUPATIONAL THERAPY EVALUATION/DISCHARGE  Patient: Yoly Cabello (66 y.o. male)  Date: 1/2/2020  Primary Diagnosis: CVA (cerebral vascular accident) [facility]) [I63.9]       Precautions:      ASSESSMENT  Based on the objective data described below, the patient presents with baseline ADL performance and mobility following admission for L LE weakness and tingling in L UE. Imagine negative for acute infarct. Patient able to demonstrate functional tasks with intact balance and good safety awareness. Strength and coordination equal and intact bilaterally and ROM WFL. No visual deficits noted during session. Patient and wife with good understanding of BE FAST education and receptive to all instruction. Patient reporting h/o difficulty with disk in his neck and L shoulder impairments (has follow up appointments with MD to manage). No further skilled acute or follow up OT services indicated at this time. Current Level of Function (ADLs/self-care): indpendent    Functional Outcome Measure: The patient scored Total A-D  Total A-D (Motor Function): 66/66 on the Fugl-Chaudhary Assessment which is indicative of no impairment in upper extremity functional status. Other factors to consider for discharge: supportive wife     PLAN :  Recommendation for discharge: (in order for the patient to meet his/her long term goals)  No skilled occupational therapy/ follow up rehabilitation needs identified at this time. This discharge recommendation:  Has been made in collaboration with the attending provider and/or case management    IF patient discharges home will need the following DME: none       SUBJECTIVE:   Patient stated I like to stay active. I have a part time job.     OBJECTIVE DATA SUMMARY:   HISTORY:   Past Medical History:   Diagnosis Date    Arthritis     knee    Cancer ([facility] [address]     skin    Chronic obstructive pulmonary disease ([facility] [address]     Hypercholesterolemia     Hypertension     Ill-defined condition     lipids    Smoking 1/22/2010    Stopped 04/2000      Past Surgical History:   Procedure Laterality Date    COLORECTAL SCRN; HI RISK IND  1/12/2016         ENDOSCOPY, COLON, DIAGNOSTIC  07/16/2007    Dr Leatha Post polyp repeat 07/2010    HX ORTHOPAEDIC Right 3/2016    arthroscopic; torn medial meniscus       Prior Level of Function/Environment/Context: patient is active and independent. Drives. No DME use. Has a part time job to keep busy. Expanded or extensive additional review of patient history:     Home Situation  Home Environment: Private residence  # Steps to Enter: 2  Rails to Enter: Yes  Hand Rails : Bilateral  One/Two Story Residence: One story  Living Alone: No  Support Systems: Spouse/Significant Other/Partner  Patient Expects to be Discharged to[de-identified] Private residence  Current DME Used/Available at Home: None  Tub or Shower Type: Shower    Hand dominance: Right    EXAMINATION OF PERFORMANCE DEFICITS:  Cognitive/Behavioral Status:  Neurologic State: Alert  Orientation Level: Oriented X4  Cognition: Appropriate decision making; Appropriate for age attention/concentration; Appropriate safety awareness; Follows commands  Perception: Appears intact  Perseveration: No perseveration noted  Safety/Judgement: Awareness of environment; Fall prevention;Good awareness of safety precautions; Home safety; Insight into deficits     Hearing: Auditory  Auditory Impairment: None    Vision/Perceptual:    Tracking: Able to track stimulus in all quadrants w/o difficulty    Diplopia: No      Range of Motion:  AROM: Within functional limits    Strength:  Strength: Within functional limits    Coordination:  Coordination: Within functional limits  Fine Motor Skills-Upper: Left Intact; Right Intact    Gross Motor Skills-Upper: Left Intact; Right Intact    Tone & Sensation:  Tone: Normal  Sensation: Intact    Balance:  Sitting: Intact  Standing: Intact    Functional Mobility and Transfers for ADLs:  Bed Mobility:  Rolling: Other (comment)(seated in bedside chair)    Transfers:  Sit to Stand: Independent  Stand to Sit: Independent  Bathroom Mobility: Independent(infer based on observations)  Toilet Transfer : Independent(infer based on observations)    ADL Assessment:  Feeding: Independent    Oral Facial Hygiene/Grooming: Independent    Bathing: Independent    Upper Body Dressing: Independent    Lower Body Dressing: Independent    Toileting: Independent    ADL Intervention and task modifications:  Upper Body Dressing Assistance  Front Opened Shirt: Independent(including buttons)    Lower Body Dressing Assistance  Shoes with Cloth Laces: Independent  Leg Crossed Method Used: Yes  Position Performed: Seated in chair  Cues: Don    Cognitive Retraining  Safety/Judgement: Awareness of environment; Fall prevention;Good awareness of safety precautions; Home safety; Insight into deficits    Functional Measure:  Fugl-Chaudhary Assessment of Motor Recovery after Stroke:   Reflex Activity  Flexors/Biceps/Fingers: Can be elicited  Extensors/Triceps: Can be elicited  Reflex Subtotal: 4    Volitional Movement Within Synergies  Shoulder Retraction: Full  Shoulder Elevation: Full  Shoulder Abduction (90 degrees): Full  Shoulder External Rotation: Full  Elbow Flexion: Full  Forearm Supination: Full  Shoulder Adduction/Internal Rotation: Full  Elbow Extension: Full  Forearm Pronation: Full  Subtotal: 18    Volitional Movement Mixing Synergies  Hand to Lumbar Spine: Full  Shoulder Flexion (0-90 degrees): Full  Pronation-Supination: Full  Subtotal: 6    Volitional Movement With Little or No Synergy  Shoulder Abduction (0-90 degrees): Full  Shoulder Flexion ( degrees): Full  Pronation/Supination: Full  Subtotal : 6    Normal Reflex Activity  Biceps, Triceps, Finger Flexors:  Full  Subtotal : 2    Upper Extremity Total   Upper Extremity Total: 36    Wrist  Stability at 15 Degree Dorsiflexion: Full  Repeated Dorsiflexion/ Volar Flexion: Full  Stability at 15 Degree Dorsiflexion: Full  Repeated Dorsiflexion/ Volar Flexion: Full  Circumduction: Full  Wrist Total: 10    Hand  Mass Flexion: Full  Mass Extension: Full  Grasp A: Full  Grasp B: Full  Grasp C: Full  Grasp D: Full  Grasp E: Full  Hand Total: 14    Coordination/Speed  Tremor: None  Dysmetria: None  Time: <1s  Coordination/Speed Total : 6    Total A-D  Total A-D (Motor Function): 66/66     This is a reliable/valid measure of arm function after a neurological event. It has established value to characterize functional status and for measuring spontaneous and therapy-induced recovery; tests proximal and distal motor functions. Fugl-Chaudhary Assessment  UE scores recorded between five and 30 days post neurologic event can be used to predict UE recovery at six months post neurologic event. Severe = 0-21 points   Moderately Severe = 22-33 points   Moderate = 34-47 points   Mild = 48-66 points  Rozanna Dandy, D., OSMIN Apple, & SARAN Hoffman (1992). Measurement of motor recovery after stroke: Outcome assessment and sample size requirements.  Stroke, 23, pp. 1581-8204.   ------------------------------------------------------------------------------------------------------------------------------------------------------------------  MCID:  Stroke:   Won Briscoe et al, 2001; n = 171; mean age 79 (5) years; assessed within 16 (12) days of stroke, Acute Stroke)  FMA Motor Scores from Admission to Discharge   10 point increase in FMA Upper Extremity = 1.5 change in discharge FIM   10 point increase in FMA Lower Extremity = 1.9 change in discharge FIM  MDC:   Stroke:   Tiffany Diallo et al, 2008, n = 14, mean age = 59.9 (14.6) years, assessed on average 14 (6.5) months post stroke, Chronic Stroke)   FMA = 5.2 points for the Upper Extremity portion of the assessment     Occupational Therapy Evaluation Charge Determination   History Examination Decision-Making   LOW Complexity : Brief history review  LOW Complexity : 1-3 performance deficits relating to physical, cognitive , or psychosocial skils that result in activity limitations and / or participation restrictions  LOW Complexity : No comorbidities that affect functional and no verbal or physical assistance needed to complete eval tasks       Based on the above components, the patient evaluation is determined to be of the following complexity level: LOW   Pain Rating:  Pt denied pain this session    Activity Tolerance:   Good, tolerates ADLs without rest breaks and SpO2 stable on RA  Please refer to the flowsheet for vital signs taken during this treatment. After treatment patient left in no apparent distress:    Sitting in chair and Caregiver / family present    COMMUNICATION/EDUCATION:   The patients plan of care was discussed with: Physical Therapist and Registered Nurse. Patient and/or family was verbally educated on the BE FAST acronym for signs/symptoms of CVA and TIA. BE FAST was written on patient's communication board  for visual education and reinforcement. All questions answered with patient indicating excellent understanding.      Thank you for this referral.  Christopher Dawn OT  Time Calculation: 17 mins

## 2020-01-02 NOTE — PROGRESS NOTES
Problem: Patient Education: Go to Patient Education Activity  Goal: Patient/Family Education  Outcome: Progressing Towards Goal     Problem: TIA/CVA Stroke: 0-24 hours  Goal: Off Pathway (Use only if patient is Off Pathway)  Outcome: Progressing Towards Goal  Goal: Activity/Safety  Outcome: Progressing Towards Goal  Goal: Consults, if ordered  Outcome: Progressing Towards Goal  Goal: Diagnostic Test/Procedures  Outcome: Progressing Towards Goal  Goal: Nutrition/Diet  Outcome: Progressing Towards Goal  Goal: Discharge Planning  Outcome: Progressing Towards Goal  Goal: Medications  Outcome: Progressing Towards Goal  Goal: Respiratory  Outcome: Progressing Towards Goal  Goal: Treatments/Interventions/Procedures  Outcome: Progressing Towards Goal  Goal: Minimize risk of bleeding post-thrombolytic infusion  Outcome: Progressing Towards Goal  Goal: Monitor for complications post-thrombolytic infusion  Outcome: Progressing Towards Goal  Goal: Psychosocial  Outcome: Progressing Towards Goal  Goal: *Hemodynamically stable  Outcome: Progressing Towards Goal  Goal: *Neurologically stable  Description  Absence of additional neurological deficits    Outcome: Progressing Towards Goal  Goal: *Verbalizes anxiety and depression are reduced or absent  Outcome: Progressing Towards Goal  Goal: *Absence of Signs of Aspiration on Current Diet  Outcome: Progressing Towards Goal  Goal: *Absence of deep venous thrombosis signs and symptoms(Stroke Metric)  Outcome: Progressing Towards Goal  Goal: *Ability to perform ADLs and demonstrates progressive mobility and function  Outcome: Progressing Towards Goal  Goal: *Stroke education started(Stroke Metric)  Outcome: Progressing Towards Goal  Goal: *Dysphagia screen performed(Stroke Metric)  Outcome: Progressing Towards Goal  Goal: *Rehab consulted(Stroke Metric)  Outcome: Progressing Towards Goal     Problem: TIA/CVA Stroke: Day 2 Until Discharge  Goal: Off Pathway (Use only if patient is Off Pathway)  Outcome: Progressing Towards Goal  Goal: Activity/Safety  Outcome: Progressing Towards Goal  Goal: Diagnostic Test/Procedures  Outcome: Progressing Towards Goal  Goal: Nutrition/Diet  Outcome: Progressing Towards Goal  Goal: Discharge Planning  Outcome: Progressing Towards Goal  Goal: Medications  Outcome: Progressing Towards Goal  Goal: Respiratory  Outcome: Progressing Towards Goal  Goal: Treatments/Interventions/Procedures  Outcome: Progressing Towards Goal  Goal: Psychosocial  Outcome: Progressing Towards Goal  Goal: *Verbalizes anxiety and depression are reduced or absent  Outcome: Progressing Towards Goal  Goal: *Absence of aspiration  Outcome: Progressing Towards Goal  Goal: *Absence of deep venous thrombosis signs and symptoms(Stroke Metric)  Outcome: Progressing Towards Goal  Goal: *Optimal pain control at patient's stated goal  Outcome: Progressing Towards Goal  Goal: *Tolerating diet  Outcome: Progressing Towards Goal  Goal: *Ability to perform ADLs and demonstrates progressive mobility and function  Outcome: Progressing Towards Goal  Goal: *Stroke education continued(Stroke Metric)  Outcome: Progressing Towards Goal     Problem: Ischemic Stroke: Discharge Outcomes  Goal: *Verbalizes anxiety and depression are reduced or absent  Outcome: Progressing Towards Goal  Goal: *Verbalize understanding of risk factor modification(Stroke Metric)  Outcome: Progressing Towards Goal  Goal: *Hemodynamically stable  Outcome: Progressing Towards Goal  Goal: *Absence of aspiration pneumonia  Outcome: Progressing Towards Goal  Goal: *Aware of needed dietary changes  Outcome: Progressing Towards Goal  Goal: *Verbalize understanding of prescribed medications including anti-coagulants, anti-lipid, and/or anti-platelets(Stroke Metric)  Outcome: Progressing Towards Goal  Goal: *Tolerating diet  Outcome: Progressing Towards Goal  Goal: *Aware of follow-up diagnostics related to anticoagulants  Outcome: Progressing Towards Goal  Goal: *Ability to perform ADLs and demonstrates progressive mobility and function  Outcome: Progressing Towards Goal  Goal: *Absence of DVT(Stroke Metric)  Outcome: Progressing Towards Goal  Goal: *Absence of aspiration  Outcome: Progressing Towards Goal  Goal: *Optimal pain control at patient's stated goal  Outcome: Progressing Towards Goal  Goal: *Home safety concerns addressed  Outcome: Progressing Towards Goal  Goal: *Describes available resources and support systems  Outcome: Progressing Towards Goal  Goal: *Verbalizes understanding of activation of EMS(911) for stroke symptoms(Stroke Metric)  Outcome: Progressing Towards Goal  Goal: *Understands and describes signs and symptoms to report to providers(Stroke Metric)  Outcome: Progressing Towards Goal  Goal: *Neurolgocially stable (absence of additional neurological deficits)  Outcome: Progressing Towards Goal  Goal: *Verbalizes importance of follow-up with primary care physician(Stroke Metric)  Outcome: Progressing Towards Goal  Goal: *Smoking cessation discussed,if applicable(Stroke Metric)  Outcome: Progressing Towards Goal  Goal: *Depression screening completed(Stroke Metric)  Outcome: Progressing Towards Goal     Problem: Falls - Risk of  Goal: *Absence of Falls  Description  Document Joey Fall Risk and appropriate interventions in the flowsheet.   Outcome: Progressing Towards Goal  Note: Fall Risk Interventions:            Medication Interventions: Bed/chair exit alarm, Patient to call before getting OOB    Elimination Interventions: Bed/chair exit alarm, Call light in reach, Patient to call for help with toileting needs, Stay With Me (per policy), Toilet paper/wipes in reach, Toileting schedule/hourly rounds    History of Falls Interventions: Bed/chair exit alarm         Problem: Patient Education: Go to Patient Education Activity  Goal: Patient/Family Education  Outcome: Progressing Towards Goal

## 2020-01-02 NOTE — PROGRESS NOTES
* No surgery found *  * No surgery found *  Bedside shift change report given to [address] [place] [address] Rd S (oncoming nurse) by Shon RN (offgoing nurse). Report included the following information SBAR, Kardex, ED Summary, Intake/Output, MAR and Recent Results. Zone Phone:   [phone]    Significant changes during shift:    Patient said he did not have any episodes of weakness or heaviness in any extremity during shift. Patient Information    Evelina Munoz  76 y.o.  1/1/2020  1:50 PM by Apurva Escamilla MD. Evelina Munoz was admitted from Home    Problem List    Patient Active Problem List    Diagnosis Date Noted    CVA (cerebral vascular accident) ([facility] 01/01/2020    COPD, moderate ([address] 09/13/2014    Actinic keratosis 01/05/2012    ED (erectile dysfunction) 02/22/2010    Hypercholesterolemia 01/22/2010    Hypertension, essential 01/22/2010    S/P colonoscopy 01/22/2010     Past Medical History:   Diagnosis Date    Arthritis     knee    Cancer ([address]     skin    Chronic obstructive pulmonary disease ([facility] [address] [facility]     Hypercholesterolemia     Hypertension     Ill-defined condition     lipids    Smoking 1/22/2010    Stopped 04/2000      Core Measures:    CVA: Yes Yes  CHF:No No  PNA:No No    Activity Status:    OOB to Chair Yes  Ambulated this shift Yes   Bed Rest No    LINES AND DRAINS:  PIV    DVT prophylaxis:    DVT prophylaxis Med- Yes  DVT prophylaxis SCD or NURIA- No     Wounds: (If Applicable)    Wounds- No    Patient Safety:    Falls Score Total Score: 1  Safety Level_______  Bed Alarm On? Yes  Sitter?  No    Plan for upcoming shift: stroke workup    Discharge Plan: TBD    Active Consults:  IP CONSULT TO NEUROLOGY  IP CONSULT TO NEUROLOGY

## 2020-01-02 NOTE — PHYSICIAN ADVISORY
Short Stay Review       Pt Name:  Kimberly Manzano   MR#  [id number]   [facility]#   [id number]   [address]  4587/40  @ [facility]   Hospitalization date  1/1/2020  1:50 PM  No discharge date for patient encounter. Current Attending Physician  Maria Del Carmen Davenport MD     A discharge order has been placed for this episode of hospital care for Mr. Kimberly Manzano; since this hospital stay is less than two midnights, I reviewed Mr. Viri Benton chart. Mr. Hollis Case's healthcare insurance/benefit include:  Payor: [place] MED[place]ARE / Plan: VA MEDICARE PART A & B / Product Type: Medicare /     Utilization Review related case summary:   Age  76 y.o.   BMI  Body mass index is 24.04 kg/m². PMHx includes  HTN, COPD, Hyperlipidemia, carotid artery disease    Hospital course  The pt was hospitalized with CVA like symptoms. H&P examination shows no neurological deficit. MRI is negative for acute CVA    Risk of deterioration at the time this patient  was hospitalized     Moderate            On the basis of chart review, this patient's hospitalization status         Should be changed to OBSERVATION       The information in this document is a recommendation to be used for utilization review and utilization management purposes only. This recommendation is not an order. The recommendation is made based on the information reviewed at the time of the referral, is pursuant to the [facility] Conditions of Participation (42 CFR Part 482), and is neither a judgment nor an assessment with regard to the appropriateness or quality of clinical care. For all Managed Care patients: The Criteria are intended solely for use as screening guidelines with respect to the medical appropriateness of healthcare services and not for final clinical or payment determinations concerning the type or level of medical care provided, or proposed to be provided, to a patient.  They help the reviewers determine whether a patient is appropriate for observation or inpatient admission at the time a decision to admit the patient is being made. All efforts are made to apply the pertinent payor criteria ([employer] or [employer]) as well as the clinical judgements based on the information reviewed at the time of the referral.\" Nothing in this document may be used to limit, alter, or affect clinical services provided to the patient named below.       Mary Buckner MD MPH FAC   Cell : [phone]  Physician [address] [place] [address]   Utilization Review, Care Management         CSN:  [id number]   STU:   [id number]  Admitted on :  1/1/2020   Discharge order

## 2020-01-02 NOTE — PROGRESS NOTES
* No surgery found *  * No surgery found *  Bedside shift change report given to Aretha Robles (oncoming nurse) by [address] (offgoing nurse). Report included the following information SBAR, Kardex, ED Summary, Intake/Output, MAR and Recent Results. Zone Phone:   [phone]    Significant changes during shift:  Patient was admitted to [facility]. Patient Information    Gemma Cobb  76 y.o.  1/1/2020  1:50 PM by Hever Galan MD. Gemma Cobb was admitted from Home    Problem List    Patient Active Problem List    Diagnosis Date Noted    CVA (cerebral vascular accident) ([address] 01/01/2020    COPD, moderate ([address] 09/13/2014    Actinic keratosis 01/05/2012    ED (erectile dysfunction) 02/22/2010    Hypercholesterolemia 01/22/2010    Hypertension, essential 01/22/2010    S/P colonoscopy 01/22/2010     Past Medical History:   Diagnosis Date    Arthritis     knee    Cancer ([address]     skin    Chronic obstructive pulmonary disease ([facility] [facility]     Hypercholesterolemia     Hypertension     Ill-defined condition     lipids    Smoking 1/22/2010    Stopped 04/2000      Core Measures:    CVA: Yes Yes  CHF:No No  PNA:No No    Activity Status:    OOB to Chair Yes  Ambulated this shift Yes   Bed Rest No    LINES AND DRAINS:  PIV    DVT prophylaxis:    DVT prophylaxis Med- Yes  DVT prophylaxis SCD or NURIA- No     Wounds: (If Applicable)    Wounds- No    Patient Safety:    Falls Score Total Score: 1  Safety Level_______  Bed Alarm On? Yes  Sitter?  No    Plan for upcoming shift: stroke workup    Discharge Plan: TBD    Active Consults:  IP CONSULT TO NEUROLOGY  IP CONSULT TO NEUROLOGY

## 2020-01-02 NOTE — PROGRESS NOTES
Orders received, chart reviewed. Pt currently off the floor to MRI. Will follow back for PT evaluation once pt returns.  Thank you    Kyree Ruelas, PT, DPT

## 2020-01-02 NOTE — PROGRESS NOTES
Patient was given discharge instructions, medication regime and plan of care. All questions were answered and patient is waiting for transportation.

## 2020-01-02 NOTE — DISCHARGE SUMMARY
Hospitalist Discharge Summary     Patient ID:  Anna Nielsen  [id number]  1 y.o.  1945 1/1/2020    PCP on record: Claudine Bronson MD    Admit date: 1/1/2020  Discharge date and time: 1/2/2020    DISCHARGE DIAGNOSIS:  See below    CONSULTATIONS:  IP CONSULT TO NEUROLOGY  IP CONSULT TO NEUROLOGY    Excerpted HPI from H&P of Norma Mederos MD:  Georgiana Valentino is a 76 y.o.  male who  presents to the ED with cc of left leg weakness.  The patient states this is his third episode of left leg weakness this week.  The symptoms started 20 minutes prior to arrival.  Symptoms did include tingling in the left arm and left leg, but that has resolved.  The patient denies headache, chest pain, back pain or trauma. Robbi Hodges has had 2 additional episodes similar to this earlier in the week.  Each episode lasted for 30 minutes before it resolved. At this time patient is lying in bed states that symptoms had improved but had 3 episodes like  this before, with numbness and weakness of left leg, numbness on left arm, denies chest pain, no SOB, no N/V no diarrhea, no fever, no cough, no urinary symptoms, no other associated symptoms. _________________________________________________________________  DISCHARGE SUMMARY/HOSPITAL COURSE:  for full details see H&P, daily progress notes, labs, consult notes. Left lower leg numbness, resolved  Head CT negative, MRI negative, Echo negative for thrombus, Doppler Carotid US negative for hemodynamically significant stenosis. A1c 5.9, LDL 90.8  Will switch ASA to Plavix and continue with high dose statin    HTN  HLD  COPD   Continue home meds  _______________________________________________________________________  Patient seen and examined by me on discharge day. Pertinent Findings:  Gen:    Not in distress  Chest: Clear lungs  CVS:   Regular rhythm.   No edema  Abd:  Soft, not distended, not tender  Neuro:  Alert, oriented x3, no focal deficits  _______________________________________________________________________  DISCHARGE MEDICATIONS:   Current Discharge Medication List      START taking these medications    Details   clopidogrel (PLAVIX) 75 mg tab Take 1 Tab by mouth daily. Qty: 30 Tab, Refills: 1      atorvastatin (LIPITOR) 40 mg tablet Take 1 Tab by mouth nightly. Qty: 30 Tab, Refills: 1         CONTINUE these medications which have NOT CHANGED    Details   lisinopril (PRINIVIL, ZESTRIL) 20 mg tablet TAKE 1 TABLET DAILY  Qty: 90 Tab, Refills: 3      tadalafil (CIALIS) 5 mg tablet TAKE 1 TABLET daily  Qty: 30 Tab, Refills: 5      albuterol (PROVENTIL HFA, VENTOLIN HFA, PROAIR HFA) 90 mcg/actuation inhaler Take 2 Puffs by inhalation every four (4) hours as needed for Wheezing. Qty: 1 Inhaler, Refills: 0    Associated Diagnoses: COPD, moderate (HCC)         STOP taking these medications       pravastatin (PRAVACHOL) 40 mg tablet Comments:   Reason for Stopping:         aspirin 81 mg chewable tablet Comments:   Reason for Stopping:                 Patient Follow Up Instructions:    Activity: Activity as tolerated  Diet: Cardiac Diet  Wound Care: None needed    Follow-up Information    None       ________________________________________________________________    Risk of deterioration: Low    Condition at Discharge:  Stable  __________________________________________________________________    Disposition  Home with family, no needs    ____________________________________________________________________    Code Status: Full Code  ___________________________________________________________________      Total time in minutes spent coordinating this discharge (includes going over instructions, follow-up, prescriptions, and preparing report for sign off to her PCP) :  35 minutes    Signed:  Tricia Quintero MD

## 2020-01-02 NOTE — PROGRESS NOTES
PHYSICAL THERAPY EVALUATION WITH DISCHARGE  Patient: Lady Munoz (8 y.o. male)  Date: 1/2/2020  Primary Diagnosis: CVA (cerebral vascular accident) [facility]) [I63.9]       Precautions:          ASSESSMENT  Based on the objective data described below, the patient presents with good strength, intact balance, intact sensation, and overall baseline independent functional mobility. Gait steady and stable overall with no LOB/balance deficits noted. Strength equal and intact in BUEs/BLEs. Pt scored 51/56 on Santos Balance test, indicating low falls risk. Pt has no further skilled therapy needs and is safe to be up ad nelson as well as discharge home w/ no further needs. DC PT. [facility] Functional Outcome Measure: The patient scored Total: 51/56 on the Santos Balance Assessment which is indicative of low fall risk. Other factors to consider for discharge:      Further skilled acute physical therapy is not indicated at this time. PLAN :  Recommendation for discharge: (in order for the patient to meet his/her long term goals)  No skilled physical therapy/ follow up rehabilitation needs identified at this time. This discharge recommendation:  Has been made in collaboration with the attending provider and/or case management    IF patient discharges home will need the following DME: none         SUBJECTIVE:   Patient stated Mickeal Good came to the ER the other day but everything had resolved by the time I got here.     OBJECTIVE DATA SUMMARY:   HISTORY:    Past Medical History:   Diagnosis Date    Arthritis     knee    Cancer ([facility]     skin    Chronic obstructive pulmonary disease ([facility]     Hypercholesterolemia     Hypertension     Ill-defined condition     lipids    Smoking 1/22/2010    Stopped 04/2000      Past Surgical History:   Procedure Laterality Date    COLORECTAL SCRN; HI RISK IND  1/12/2016         ENDOSCOPY, COLON, DIAGNOSTIC  07/16/2007    Dr Mingo Wild polyp repeat 07/2010    HX ORTHOPAEDIC Right 3/2016 arthroscopic; torn medial meniscus       Prior level of function: Independent w ambulation and ADLs. Denies history of falls. Still driving. Retired however works 1-2 days/wk. Mows grass with push mower. Lives w/ wife who is able to assist as needed. Personal factors and/or comorbidities impacting plan of care:     Home Situation  Home Environment: Private residence  # Steps to Enter: 2  Rails to Enter: Yes  Hand Rails : Bilateral  One/Two Story Residence: One story  Living Alone: No  Support Systems: Spouse/Significant Other/Partner  Patient Expects to be Discharged to[de-identified] Private residence  Current DME Used/Available at Home: None  Tub or Shower Type: Shower    EXAMINATION/PRESENTATION/DECISION MAKING:   Critical Behavior:  Neurologic State: Alert  Orientation Level: Oriented X4  Cognition: Follows commands, Appropriate decision making, Appropriate for age attention/concentration, Appropriate safety awareness     Hearing: Auditory  Auditory Impairment: None  Skin:  intact  Edema: none noted   Range Of Motion:  AROM: Within functional limits                       Strength:    Strength:  Within functional limits                    Tone & Sensation:   Tone: Normal              Sensation: Intact               Coordination:  Coordination: Within functional limits  Vision:      Functional Mobility:  Bed Mobility:  Rolling: Other (comment)(seated in bedside chair)           Transfers:  Sit to Stand: Independent  Stand to Sit: Independent                       Balance:   Sitting: Intact  Standing: Intact  Ambulation/Gait Training:  Distance (ft): 300 Feet (ft)  Assistive Device: Gait belt  Ambulation - Level of Assistance: Independent                                    Functional Measure  Santos Balance Test:    Sitting to Standin  Standing Unsupported: 4  Sitting with Back Unsupported: 4  Standing to Sittin  Transfers: 4  Standing Unsupported with Eyes Closed: 4  Standing Unsupported with Feet Together: 4  Reach Forward with Outstretched Arm: 4   Object: 4  Turn to Look Over Shoulders: 4  Turn 360 Degrees: 4  Alternate Foot on Step/Stool: 4  Standing Unsupported One Foot in Front: 1  Stand on One Le  Total: 51/56         56=Maximum possible score;   0-20=High fall risk  21-40=Moderate fall risk   41-56=Low fall risk        Physical Therapy Evaluation Charge Determination   History Examination Presentation Decision-Making   MEDIUM  Complexity : 1-2 comorbidities / personal factors will impact the outcome/ POC  MEDIUM Complexity : 3 Standardized tests and measures addressing body structure, function, activity limitation and / or participation in recreation  MEDIUM Complexity : Evolving with changing characteristics  MEDIUM Complexity : FOTO score of 26-74      Based on the above components, the patient evaluation is determined to be of the following complexity level: MEDIUM    Pain Rating:  Denied complaints of pain    Activity Tolerance:   Good  Please refer to the flowsheet for vital signs taken during this treatment. After treatment patient left in no apparent distress:   Sitting in chair, Call bell within reach and Caregiver / family present    COMMUNICATION/EDUCATION:   The patients plan of care was discussed with: Occupational Therapist and Registered Nurse. Patient was educated regarding His deficit(s) of resolved LLE wkness/numbness as this relates to His diagnosis of TIA. He demonstrated Excellent understanding as evidenced by verbalization. Patient and/or family was verbally educated on the BE FAST acronym for signs/symptoms of CVA and TIA. BE FAST was written on patient's communication board  for visual education and reinforcement. All questions answered with patient indicating good understanding. Fall prevention education was provided and the patient/caregiver indicated understanding., Patient/family have participated as able in goal setting and plan of care.  and Patient/family agree to work toward stated goals and plan of care.     Thank you for this referral.  Annabel Calderón, PT, DPT   Time Calculation: 18 mins

## 2020-01-02 NOTE — PROGRESS NOTES
Reason for Admission:   CVA                   RRAT Score:  16                Do you (patient/family) have any concerns for transition/discharge? No concerns at this time                 Plan for utilizing home health:  No HHC recommended      Current Advanced Directive/Advance Care Plan:  FULL-Spouse            Transition of Care Plan:          CM completed room visit with pt, with spouse by bedside. Pt reported that he resides in a 1 story home with family. Pt reported that he is independent with ADLs, and he drives. Pt's spouse will transport him home at d/c. Pt reported that he is active with PCP: seen every 6 months. Pt reported that [facility] uses [facility] [employer] [facility]). Pt reported no DME, HHC, SNF . Pt denies needing additional assistance at d/c. Pt listed spouse as medical decision maker when discussing ACP. Pt received 2nd IM Medicare Letter (signed) placed in chart. CM will continue to follow. Care Management Interventions  PCP Verified by CM: Yes  Mode of Transport at Discharge:  Other (see comment)  Transition of Care Consult (CM Consult): Discharge Planning  Discharge Durable Medical Equipment: No  Physical Therapy Consult: Yes  Occupational Therapy Consult: Yes  Speech Therapy Consult: Yes  Current Support Network: Lives with Spouse, Own Home  Confirm Follow Up Transport: Family  Discharge Location  Discharge Placement: [address] [place] [address] [address], [place], [address] [place] [address]

## 2020-01-03 ENCOUNTER — PATIENT OUTREACH (OUTPATIENT)
Dept: INTERNAL MEDICINE CLINIC | Age: 75
End: 2020-01-03

## 2020-01-03 LAB — ANA SER QL: NEGATIVE

## 2020-01-05 PROBLEM — I63.9 CVA (CEREBRAL VASCULAR ACCIDENT) (HCC): Status: RESOLVED | Noted: 2020-01-01 | Resolved: 2020-01-05

## 2020-01-06 ENCOUNTER — OFFICE VISIT (OUTPATIENT)
Dept: INTERNAL MEDICINE CLINIC | Facility: CLINIC | Age: 75
End: 2020-01-06

## 2020-01-06 VITALS
HEART RATE: 75 BPM | RESPIRATION RATE: 12 BRPM | DIASTOLIC BLOOD PRESSURE: 83 MMHG | BODY MASS INDEX: 24.05 KG/M2 | SYSTOLIC BLOOD PRESSURE: 119 MMHG | OXYGEN SATURATION: 95 % | WEIGHT: 168 LBS | HEIGHT: 70 IN | TEMPERATURE: 97.6 F

## 2020-01-06 DIAGNOSIS — G45.1 TRANSIENT ISCHEMIC ATTACK INVOLVING RIGHT INTERNAL CAROTID ARTERY: Primary | ICD-10-CM

## 2020-01-06 DIAGNOSIS — E78.00 HYPERCHOLESTEROLEMIA: ICD-10-CM

## 2020-01-06 DIAGNOSIS — Z13.31 SCREENING FOR DEPRESSION: ICD-10-CM

## 2020-01-06 DIAGNOSIS — E53.8 VITAMIN B12 DEFICIENCY: ICD-10-CM

## 2020-01-06 RX ORDER — CLOPIDOGREL BISULFATE 75 MG/1
75 TABLET ORAL DAILY
Qty: 90 TAB | Refills: 3 | Status: SHIPPED | OUTPATIENT
Start: 2020-01-06 | End: 2021-01-15

## 2020-01-06 RX ORDER — ATORVASTATIN CALCIUM 40 MG/1
40 TABLET, FILM COATED ORAL
Qty: 90 TAB | Refills: 3 | Status: SHIPPED | OUTPATIENT
Start: 2020-01-06 | End: 2021-02-01

## 2020-01-06 NOTE — PROGRESS NOTES
HISTORY OF PRESENT ILLNESS  Brianna Crawford is a 76 y.o. male. He is accompanied by his wife. HPI  Mr. Karma Cardenas is a 76y.o. year old male, he is seen today, within 2 business days of discharge,  for Transition of Care services following a hospital discharge for TIA on Jan 2. Our office Nurse Navigator attempted an outreach to Mr. Chantel Ferrari on Shon 3 (within 2 business days of discharge) to complete medication reconciliation and a telephonic assessment of his condition. She reached him on Jan 6, still withing 2 business days. He has history of COPD, hypertension, hyperlipidemia and ED. He was admitted to the hospital on Jan 1 with complaint of 3 episodes of left leg weakness (unable to support weight) and tingling that lasted 30 min or less. He had a negative head CT, CTA and MRI. Echocardiogram showed no clots. Carotid dopplers showed no significant stenosis. Extensive lab evaluation was normal except low B12, which appears not to have been addressed. Aspirin was replaced by Plavix and pravastatin was replaced by atorvastatin. He has had no additional episodes of left leg weakness or tingling, but thinks left arm and leg just feel a bit weaker than the right. He denies change in vision, difficulty speaking or understanding speech, facial droop. He has no problem ambulating. Appetite is good. He has no problems with medications.      He has questions about how long he needs to take Plavix and what the adverse effects might be.     3 most recent PHQ Screens 1/6/2020   Little interest or pleasure in doing things Not at all   Feeling down, depressed, irritable, or hopeless Not at all   Total Score PHQ 2 0     Patient Active Problem List   Diagnosis Code    Hypercholesterolemia E78.00    Hypertension, essential I10    S/P colonoscopy Z61.410    ED (erectile dysfunction) N52.9    Actinic keratosis L57.0    COPD, moderate ([address] J44.9    Bilateral carotid artery stenosis I65.23    Transient ischemic attack involving right internal carotid artery G45.1    Disturbance of memory R41.3     Past Medical History:   Diagnosis Date    Arthritis     knee    Cancer ([facility]     skin    Chronic obstructive pulmonary disease ([facility] [address]     Hypercholesterolemia     Hypertension     Ill-defined condition     lipids    Smoking 2010    Stopped 2000      Past Surgical History:   Procedure Laterality Date    COLORECTAL SCRN; HI RISK IND  2016         ENDOSCOPY, COLON, DIAGNOSTIC  2007    Dr Melony Smith polyp repeat 2010    HX ORTHOPAEDIC Right 3/2016    arthroscopic; torn medial meniscus     Social History     Socioeconomic History    Marital status:      Spouse name: Not on file    Number of children: 3    Years of education: Not on file    Highest education level: Not on file   Occupational History    Occupation: construction   Tobacco Use    Smoking status: Former Smoker     Packs/day: 2.00     Years: 40.00     Pack years: 80.00     Last attempt to quit: 2000     Years since quittin.7    Smokeless tobacco: Never Used   Substance and Sexual Activity    Alcohol use: Yes     Alcohol/week: 0.0 standard drinks     Types: 2 - 3 Cans of beer per week     Comment: Soc    Drug use: No     Family History   Problem Relation Age of Onset    Hypertension Mother     Thyroid Disease Mother     Stroke Mother     Kidney Disease Father     Cancer Father         skin    Diabetes Father     Neuropathy Brother     Cancer Paternal Aunt         lung    Cancer Paternal Uncle         skin    Diabetes Maternal Grandmother     Hypertension Child      Allergies   Allergen Reactions    Macrobid [Nitrofurantoin Monohyd/M-Cryst] Unknown (comments)    Tetracycline Unknown (comments)     Current Outpatient Medications   Medication Sig Dispense Refill    clopidogrel (PLAVIX) 75 mg tab Take 1 Tab by mouth daily. 30 Tab 1    atorvastatin (LIPITOR) 40 mg tablet Take 1 Tab by mouth nightly.  30 Tab 1    tadalafil (CIALIS) 5 mg tablet TAKE 1 TABLET daily 30 Tab 5    albuterol (PROVENTIL HFA, VENTOLIN HFA, PROAIR HFA) 90 mcg/actuation inhaler Take 2 Puffs by inhalation every four (4) hours as needed for Wheezing. 1 Inhaler 0    lisinopril (PRINIVIL, ZESTRIL) 20 mg tablet TAKE 1 TABLET DAILY 90 Tab 3         Review of Systems   Constitutional: Negative for malaise/fatigue and weight loss. Gastrointestinal: Negative for constipation, diarrhea and heartburn. Musculoskeletal: Negative for back pain and joint pain. Neurological: Negative for dizziness, tingling and focal weakness. Visit Vitals  /83 (BP 1 Location: Left arm, BP Patient Position: Sitting)   Pulse 75   Temp 97.6 °F (36.4 °C) (Oral)   Resp 12   Ht 5' 10\" (1.778 m)   Wt 168 lb (76.2 kg)   SpO2 95%   BMI 24.11 kg/m²     Physical Exam  Vitals signs and nursing note reviewed. Constitutional:       Appearance: Normal appearance. HENT:      Head: Normocephalic and atraumatic. Mouth/Throat:      Mouth: Mucous membranes are moist.   Eyes:      Conjunctiva/sclera: Conjunctivae normal.   Neck:      Musculoskeletal: Neck supple. Cardiovascular:      Rate and Rhythm: Normal rate and regular rhythm. Heart sounds: Normal heart sounds. Heart sounds not distant. No murmur. No gallop. Pulmonary:      Effort: Pulmonary effort is normal.      Breath sounds: Normal breath sounds and air entry. No wheezing, rhonchi or rales. Musculoskeletal:      Right lower leg: No edema. Left lower leg: No edema. Lymphadenopathy:      Cervical: No cervical adenopathy. Skin:     General: Skin is warm and dry. Findings: No rash. Neurological:      Mental Status: He is alert and oriented to person, place, and time. Cranial Nerves: Cranial nerves are intact. Sensory: Sensation is intact. Motor: Motor function is intact. Coordination: Coordination is intact. Gait: Gait is intact.       Deep Tendon Reflexes:      Reflex Scores:       Bicep reflexes are 2+ on the right side and 2+ on the left side. Patellar reflexes are 2+ on the right side and 2+ on the left side. Psychiatric:         Mood and Affect: Mood normal.         Behavior: Behavior normal. Behavior is cooperative. Lab Results   Component Value Date/Time    Cholesterol, total 162 01/01/2020 06:15 PM    HDL Cholesterol 51 01/01/2020 06:15 PM    LDL, calculated 90.8 01/01/2020 06:15 PM    VLDL, calculated 20.2 01/01/2020 06:15 PM    Triglyceride 101 01/01/2020 06:15 PM    CHOL/HDL Ratio 3.2 01/01/2020 06:15 PM     Lab Results   Component Value Date/Time    Sodium 142 01/02/2020 03:54 AM    Potassium 3.6 01/02/2020 03:54 AM    Chloride 112 (H) 01/02/2020 03:54 AM    CO2 25 01/02/2020 03:54 AM    Anion gap 5 01/02/2020 03:54 AM    Glucose 121 (H) 01/02/2020 03:54 AM    BUN 13 01/02/2020 03:54 AM    Creatinine 1.10 01/02/2020 03:54 AM    BUN/Creatinine ratio 12 01/02/2020 03:54 AM    GFR est AA >60 01/02/2020 03:54 AM    GFR est non-AA >60 01/02/2020 03:54 AM    Calcium 8.3 (L) 01/02/2020 03:54 AM    Bilirubin, total 0.7 01/02/2020 03:54 AM    AST (SGOT) 22 01/02/2020 03:54 AM    Alk. phosphatase 106 01/02/2020 03:54 AM    Protein, total 6.4 01/02/2020 03:54 AM    Albumin 3.2 (L) 01/02/2020 03:54 AM    Globulin 3.2 01/02/2020 03:54 AM    A-G Ratio 1.0 (L) 01/02/2020 03:54 AM    ALT (SGPT) 27 01/02/2020 03:54 AM     Lab Results   Component Value Date/Time    TSH 1.19 01/01/2020 06:15 PM    T4, Free 0.7 (L) 01/01/2020 06:15 PM     Lab Results   Component Value Date/Time    Vitamin B12 176 (L) 01/02/2020 11:09 AM    Folate 19.6 01/02/2020 11:09 AM     Lab Results   Component Value Date/Time    Hemoglobin A1c 5.9 (H) 01/01/2020 06:15 PM     CT Results (most recent):  Results from [facility] encounter on 01/01/20   CTA HEAD NECK W CONT    Narrative Preliminary report:    No intracranial arterial large vessel occlusion.      Bilateral carotid body atherosclerotic plaque. Focal moderate narrowing of the  proximal left and right internal carotid arteries. Patent bilateral vertebral arteries. Focal atherosclerotic plaque within the  distal left and right vertebral arteries with mild narrowing at this level. Biapical emphysematous changes. Biapical pleural parenchymal scarring. Final report to follow. INDICATION: Left leg heaviness, TIA. Contrast-enhanced CT angiogram head and neck performed using 100 cc Isovue-370. Three-dimensional postprocessing was performed. CT dose reduction was achieved through use of a standardized protocol tailored  for this examination and are automatic exposure control for dose modulation dose  reduction    NECK:    The origins of the great vessels are patent. Both vertebral arteries are patent. There bilateral carotid bifurcation calcifications. There is approximately 30%  stenosis in the proximal internal carotid artery using NASCET criteria  bilaterally. . There are nonspecific pleural-based nodules at the apices. .    Head:    Major intracranial vessels are patent. No large vessel occlusion or intracranial  aneurysm. No evidence for intracranial hemorrhage or acute stroke. The  underlying brain is unremarkable. Impression IMPRESSION:  1. No acute abnormality. 2. Nonspecific pleural-based nodules at the apices. MRI Results (most recent):  Results from [place] encounter on 01/01/20   MRI BRAIN WO CONT    Narrative INDICATION:   cva     EXAMINATION:  MRI BRAIN WO CONTRAST    COMPARISON:  MRI July 6, 2017, CTA January 1, 2020    TECHNIQUE:  Multiplanar multisequence acquisition without contrast of the brain. FINDINGS:      Ventricles:  Midline, no hydrocephalus. Brain Parenchyma/Brainstem:  Few scattered T2 hyperintensities in the  supratentorial white matter. No acute infarction. Intracranial Hemorrhage:  None.    Basal Cisterns:  Normal.   Flow Voids:  Normal.  Additional Comments:  N/A. Impression IMPRESSION:  Minimal chronic white matter disease with no acute infarction. 01/01/20   ECHO ADULT COMPLETE 01/02/2020 1/2/2020    Narrative · Normal cavity size and systolic function (ejection fraction normal). Mild concentric hypertrophy. Estimated left ventricular ejection fraction   is 50 - 55%. · Mitral valve non-specific thickening. Trace mitral valve regurgitation   is present. · Mild tricuspid valve regurgitation is present. Limited quality echo images. Signed by: Gabriela Mejia MD         ASSESSMENT and PLAN    ICD-10-CM ICD-9-CM    1. Transient ischemic attack involving right internal carotid artery G45.1 435.8 clopidogrel (PLAVIX) 75 mg tab   2. Hypercholesterolemia E78.00 272.0 atorvastatin (LIPITOR) 40 mg tablet      LIPID PANEL   3. Vitamin B12 deficiency E53.8 266.2    4. Screening for depression Z13.31 V79.0 [facility] DEPRESSION SCREEN ANNUAL     Diagnoses and all orders for this visit:    1. Transient ischemic attack involving right internal carotid artery  No recurrent episodes, Should consider Plavix lifle long unless it causes a  problem  -     clopidogrel (PLAVIX) 75 mg tab; Take 1 Tab by mouth daily. 2. Hypercholesterolemia  -     atorvastatin (LIPITOR) 40 mg tablet; Take 1 Tab by mouth nightly. -     LIPID PANEL; Future    3. Vitamin B12 deficiency  Start cyanocobalamin 1.000 mcg daily. Will recheck B12 in about 6 months. 4. Screening for depression-negative  -     [facility] DEPRESSION SCREEN ANNUAL      Follow-up and Dispositions    · Return for Fasting lab one week before Feb 18 office visit. Anupama Grady        lab results and schedule of future lab studies reviewed with patient  reviewed diet, exercise and weight control  radiology results and schedule of future radiology studies reviewed with patient

## 2020-01-06 NOTE — PROGRESS NOTES
Hospital Discharge Follow-Up      Date/Time:  2020 2:30 PM      Patient was admitted to [facility] on 20 and discharged on 20 for LLE numbess. The physician discharge summary was available at the time of outreach. Patient was contacted within 2 business days of discharge. Top Challenges reviewed with the provider   Admitted with LLE numbness -- TIA?  - neuro appt with Dr. Zelalem Castro   - d/c ASA + Plavix   A1C 5.9  Advance Care Planning:   Does patient have an Advance Directive:  reviewed and current        Method of communication with provider :chart routing    Inpatient RRAT score: 25  Was this a readmission? no   Patient stated reason for the readmission: n/a    Care Transition Nurse (CTN) contacted the patient by telephone to perform post hospital discharge assessment. Verified name and  with patient as identifiers. Provided introduction to self, and explanation of the CTN role. Patient received hospital discharge instructions. CTN reviewed discharge instructions and red flags with patient who verbalized understanding. Patient given an opportunity to ask questions and does not have any further questions or concerns at this time. The patient agrees to contact the PCP office for questions related to their healthcare. CTN provided contact information for future reference. Disease Specific:   N/A    Patients top risk factors for readmission:  lack of knowledge about disease    Home Health orders at discharge:none (patient declined a need)    Durable Medical Equipment ordered at discharge:none    Medication(s):   New Medications at Discharge:   clopidogrel (PLAVIX) 75 mg tab Take 1 Tab by mouth daily. Qty: 30 Tab, Refills: 1       atorvastatin (LIPITOR) 40 mg tablet Take 1 Tab by mouth nightly.   Qty: 30 Tab, Refills: 1       Changed Medications at Discharge: n/a  Discontinued Medications at Discharge:    pravastatin (PRAVACHOL) 40 mg tablet Comments:   Reason for Stopping:            aspirin 81 mg chewable tablet Comments:   Reason for Stopping:         Medication reconciliation was performed with patient, who verbalizes understanding of administration of home medications. There were no barriers to obtaining medications identified at this time. Referral to Pharm D needed: no     Current Outpatient Medications   Medication Sig    clopidogrel (PLAVIX) 75 mg tab Take 1 Tab by mouth daily.  atorvastatin (LIPITOR) 40 mg tablet Take 1 Tab by mouth nightly.  tadalafil (CIALIS) 5 mg tablet TAKE 1 TABLET daily    albuterol (PROVENTIL HFA, VENTOLIN HFA, PROAIR HFA) 90 mcg/actuation inhaler Take 2 Puffs by inhalation every four (4) hours as needed for Wheezing.  lisinopril (PRINIVIL, ZESTRIL) 20 mg tablet TAKE 1 TABLET DAILY     No current facility-administered medications for this visit. There are no discontinued medications. [facility] follow up appointment(s):   Future Appointments   Date Time Provider Bertrand Mcgowanisti   1/29/2020  8:40 AM Danitza Romero MD [address] [place] [address]   2/18/2020  8:45 AM Claudine Bronson MD [facility]      Non-[facility] follow up appointment(s): n/a  [facility]:  information provided as a resource     Goals      Prevent complications post hospitalization. 01/07/20    - Spoke to patient today.  Patient lives with his wife who is able to assist him if needed  - attended PCP f/u yesterday, patient stated \"Dr. José Miguel Ornelas filled in the blanks for me\"  - PCP f/u scheduled for [facility] 2/11  - Neuro Dr. Nilson James 1/29  - confirmed medications  - Patient now on Plavix - reviewed bleeding precautions such as using electric razor, may bleed and bruise easier, use caution when using sharp objects.   - Reviewed red flag s/s with patient, nausea, vomiting, inability to pass urine/stool, SOB, mental status change, chest pain, fever.   -  Reviewed FAST with patient and signs of CVA, such as sudden numbness, tingling, loss of movement in your face, arm, leg, especially on one side of the body, vision changes, trouble speaking, confusion, walking or balance problems and sudden headache.   - declined a need for home health   - [facility] provided as a resource    Patient will contact Md/CTN if red flag s/s arise. CTN to w/u ~ 7-10 days.  AR

## 2020-01-06 NOTE — PROGRESS NOTES
James Draper  Identified pt with two pt identifiers(name and ). Chief Complaint   Patient presents with   [facility] Follow Up       Reviewed record In preparation for visit and have obtained necessary documentation. Advanced directive / living will on file. 1. Have you been to the ER, urgent care clinic or hospitalized since your last visit? No     2. Have you seen or consulted any other health care providers outside of the [address] [place] [address] since your last visit? Include any pap smears or colon screening. No    Vitals reviewed with provider.     Health Maintenance reviewed:     Health Maintenance Due   Topic    Shingrix Vaccine Age 50> (1 of 2)          Wt Readings from Last 3 Encounters:   20 168 lb (76.2 kg)   20 167 lb 8.8 oz (76 kg)   20 178 lb (80.7 kg)        Temp Readings from Last 3 Encounters:   20 97.6 °F (36.4 °C) (Oral)   20 98 °F (36.7 °C)   10/25/19 98.4 °F (36.9 °C) (Oral)        BP Readings from Last 3 Encounters:   20 119/83   20 125/86   19 127/86        Pulse Readings from Last 3 Encounters:   20 75   20 61   19 68        Vitals:    20 1351   BP: 119/83   Pulse: 75   Resp: 12   Temp: 97.6 °F (36.4 °C)   TempSrc: Oral   SpO2: 95%   Weight: 168 lb (76.2 kg)   Height: 5' 10\" (1.778 m)   PainSc:   1   PainLoc: Leg          Learning Assessment:   :       Learning Assessment 2017   PRIMARY LEARNER Patient Patient Patient   HIGHEST LEVEL OF EDUCATION - PRIMARY LEARNER  - - DID NOT GRADUATE HIGH SCHOOL   BARRIERS PRIMARY LEARNER - - NONE   CO-LEARNER CAREGIVER - - No   PRIMARY LANGUAGE ENGLISH ENGLISH ENGLISH   LEARNER PREFERENCE PRIMARY DEMONSTRATION DEMONSTRATION READING   ANSWERED BY patient patient self   RELATIONSHIP SELF SELF SELF        Depression Screening:   :       3 most recent PHQ Screens 2020   Little interest or pleasure in doing things Not at all   Feeling down, depressed, irritable, or hopeless Not at all   Total Score PHQ 2 0        Fall Risk Assessment:   :       Fall Risk Assessment, last 12 mths 8/26/2019   Able to walk? Yes   Fall in past 12 months? No   Fall with injury? -   Number of falls in past 12 months -   Fall Risk Score -        Abuse Screening:   :       Abuse Screening Questionnaire 8/26/2019 10/15/2018 10/24/2017 10/11/2016 9/10/2015 9/17/2014   Do you ever feel afraid of your partner? N N N N N N   Are you in a relationship with someone who physically or mentally threatens you? N N N N N N   Is it safe for you to go home?  Y Y Y Y Y Y        ADL Screening:   :       ADL Assessment 1/6/2020   Feeding yourself No Help Needed   Getting from bed to chair No Help Needed   Getting dressed No Help Needed   Bathing or showering No Help Needed   Walk across the room (includes cane/walker) No Help Needed   Using the telphone No Help Needed   Taking your medications No Help Needed   Preparing meals No Help Needed   Managing money (expenses/bills) No Help Needed   Moderately strenuous housework (laundry) No Help Needed   Shopping for personal items (toiletries/medicines) No Help Needed   Shopping for groceries No Help Needed   Driving No Help Needed   Climbing a flight of stairs No Help Needed   Getting to places beyond walking distances No Help Needed

## 2020-01-06 NOTE — PATIENT INSTRUCTIONS
Take cyanocobalamin (Vitamin B12) 1,000 mcg daily  Fasting lab one week before Feb 18 office visit. Transient Ischemic Attack: Care Instructions  Your Care Instructions    A transient ischemic attack (TIA) is when blood flow to a part of your brain is blocked for a short time. A TIA is like a stroke but usually lasts only a few minutes. A TIA does not cause lasting brain damage. Any vision problems, slurred speech, or other symptoms usually go away in 10 to 20 minutes. But they may last for up to 24 hours. TIAs are often warning signs of a stroke. Some people who have a TIA may have a stroke in the future. A stroke can cause symptoms like those of a TIA. But a stroke causes lasting damage to your brain. You can take steps to help prevent a stroke. One thing you can do is get early treatment. If you have other new symptoms, or if your symptoms do not get better, go back to the emergency room or call your doctor right away. Getting treatment right away may prevent long-term brain damage caused by a stroke. The doctor has checked you carefully, but problems can develop later. If you notice any problems or new symptoms, get medical treatment right away. Follow-up care is a key part of your treatment and safety. Be sure to make and go to all appointments, and call your doctor if you are having problems. It's also a good idea to know your test results and keep a list of the medicines you take. How can you care for yourself at home? Medicines    · Be safe with medicines. Take your medicines exactly as prescribed. Call your doctor if you think you are having a problem with your medicine.     · If you take a blood thinner, such as aspirin, be sure you get instructions about how to take your medicine safely.  Blood thinners can cause serious bleeding problems.     · Call your doctor if you are not able to take your medicines for any reason.     · Do not take any over-the-counter medicines or herbal products without talking to your doctor first.     · If you take birth control pills or hormone therapy, talk to your doctor. Ask if these treatments are right for you.    Lifestyle changes    · Do not smoke. If you need help quitting, talk to your doctor about stop-smoking programs and medicines.     · Be active. If your doctor recommends it, get more exercise. Walking is a good choice. Bit by bit, increase the amount you walk every day. Try for at least 30 minutes on most days of the week. You also may want to swim, bike, or do other activities.     · Eat heart-healthy foods. These include fruits, vegetables, high-fiber foods, fish, and foods that are low in sodium, saturated fat, and trans fat.     · Stay at a healthy weight. Lose weight if you need to.     · Limit alcohol to 2 drinks a day for men and 1 drink a day for women.    Staying healthy    · Manage other health problems such as diabetes, high blood pressure, and high cholesterol.     · Get the flu vaccine every year. When should you call for help? Call 911 anytime you think you may need emergency care. For example, call if:    · You have new or worse symptoms of a stroke. These may include:  ? Sudden numbness, tingling, weakness, or loss of movement in your face, arm, or leg, especially on only one side of your body. ? Sudden vision changes. ? Sudden trouble speaking. ? Sudden confusion or trouble understanding simple statements. ? Sudden problems with walking or balance. ? A sudden, severe headache that is different from past headaches. Call 911 even if these symptoms go away in a few minutes.     · You feel like you are having another TIA.    Watch closely for changes in your health, and be sure to contact your doctor if you have any problems. Where can you learn more? Go to http://ronald-biju.info/. Enter (25) 2869 9831 in the search box to learn more about \"Transient Ischemic Attack: Care Instructions. \"  Current as of: September 26, 2018  Content Version: 12.2  © 6858-7612 [employer]. Care instructions adapted under license by [employer] (which disclaims liability or warranty for this information). If you have questions about a medical condition or this instruction, always ask your healthcare professional. [address] any warranty or liability for your use of this information.

## 2020-01-22 ENCOUNTER — PATIENT OUTREACH (OUTPATIENT)
Dept: INTERNAL MEDICINE CLINIC | Age: 75
End: 2020-01-22

## 2020-01-23 NOTE — PROGRESS NOTES
Goals      Prevent complications post hospitalization. 01/07/20    - Spoke to patient today. Patient lives with his wife who is able to assist him if needed  - attended PCP f/u yesterday, patient stated \"Dr. Parish Oh filled in the blanks for me\"  - PCP f/u scheduled for [facility] 2/11  - Neuro Dr. Tasha Ram 1/29  - confirmed medications  - Patient now on Plavix - reviewed bleeding precautions such as using electric razor, may bleed and bruise easier, use caution when using sharp objects.   - Reviewed red flag s/s with patient, nausea, vomiting, inability to pass urine/stool, SOB, mental status change, chest pain, fever.   -  Reviewed FAST with patient and signs of CVA, such as sudden numbness, tingling, loss of movement in your face, arm, leg, especially on one side of the body, vision changes, trouble speaking, confusion, walking or balance problems and sudden headache.   - declined a need for home health   - 15 [facility] provided as a resource    Patient will contact Md/CTN if red flag s/s arise. CTN to w/u ~ 7-10 days. AR      01/23/20  - LM for patient call to CTN for f/u ADELINA call.  AR

## 2020-01-29 ENCOUNTER — OFFICE VISIT (OUTPATIENT)
Dept: NEUROLOGY | Age: 75
End: 2020-01-29

## 2020-01-29 VITALS
OXYGEN SATURATION: 98 % | SYSTOLIC BLOOD PRESSURE: 132 MMHG | DIASTOLIC BLOOD PRESSURE: 82 MMHG | BODY MASS INDEX: 24.2 KG/M2 | WEIGHT: 169 LBS | HEIGHT: 70 IN | HEART RATE: 51 BPM

## 2020-01-29 DIAGNOSIS — G45.9 TIA (TRANSIENT ISCHEMIC ATTACK): Primary | ICD-10-CM

## 2020-01-29 DIAGNOSIS — E78.2 MIXED HYPERLIPIDEMIA: ICD-10-CM

## 2020-01-29 DIAGNOSIS — E53.8 B12 DEFICIENCY: ICD-10-CM

## 2020-01-29 DIAGNOSIS — I10 ESSENTIAL HYPERTENSION: ICD-10-CM

## 2020-01-29 RX ORDER — LANOLIN ALCOHOL/MO/W.PET/CERES
1000 CREAM (GRAM) TOPICAL DAILY
COMMUNITY

## 2020-01-29 NOTE — PROGRESS NOTES
Neurology follow-up note    Chief Complaint   Patient presents with   [facility] Follow Up     left leg numbness    Numbness       PIEDAD Esparza is a 76 y.o. male who presented to the neurology office for management of TIA    The patient was admitted to the hospital on 1/1/2020 and discharged on 1/2/2020. The patient presented because of left leg weakness and this is the third episode of left leg weakness. He did have associated tingling in the left arm and the left leg but that has resolved. Episode lasted for around 20 minutes. Patient was seen by Dr. Sherley Marte during the hospitalization. Patient has had around 3 episodes in the last 1 week. Patient's MRI of the brain was normal and CT angiogram of the head and neck were normal as well. The patient was taking aspirin and that was switched to Plavix 75 mg daily. Patient is doing very well with no recurrence of symptoms. Current Outpatient Medications   Medication Sig    cyanocobalamin 1,000 mcg tablet Take 1,000 mcg by mouth daily.  atorvastatin (LIPITOR) 40 mg tablet Take 1 Tab by mouth nightly.  clopidogrel (PLAVIX) 75 mg tab Take 1 Tab by mouth daily.  tadalafil (CIALIS) 5 mg tablet TAKE 1 TABLET daily    albuterol (PROVENTIL HFA, VENTOLIN HFA, PROAIR HFA) 90 mcg/actuation inhaler Take 2 Puffs by inhalation every four (4) hours as needed for Wheezing.  lisinopril (PRINIVIL, ZESTRIL) 20 mg tablet TAKE 1 TABLET DAILY     No current facility-administered medications for this visit.         Past Medical History:   Diagnosis Date    Arthritis     knee    Cancer ([facility] [address]     skin    Chronic obstructive pulmonary disease ([facility] [address]     Hypercholesterolemia     Hypertension     Ill-defined condition     lipids    Smoking 1/22/2010    Stopped 04/2000      Past Surgical History:   Procedure Laterality Date    COLORECTAL SCRN; HI RISK IND  1/12/2016         ENDOSCOPY, COLON, DIAGNOSTIC  07/16/2007    Dr Álvaro Huggins polyp repeat 07/2010  HX ORTHOPAEDIC Right 3/2016    arthroscopic; torn medial meniscus     Family History   Problem Relation Age of Onset    Hypertension Mother     Thyroid Disease Mother     Stroke Mother     Kidney Disease Father     Cancer Father         skin    Diabetes Father     Neuropathy Brother     Cancer Paternal Aunt         lung    Cancer Paternal Uncle         skin    Diabetes Maternal Grandmother     Hypertension Child      Social History     Tobacco Use    Smoking status: Former Smoker     Packs/day: 2.00     Years: 40.00     Pack years: 80.00     Last attempt to quit: 2000     Years since quittin.8    Smokeless tobacco: Never Used   Substance Use Topics    Alcohol use: Yes     Alcohol/week: 0.0 standard drinks     Types: 2 - 3 Cans of beer per week     Comment: Soc    Drug use: No       REVIEW OF SYSTEMS:   A ten system review of constitutional, cardiovascular, respiratory, musculoskeletal, endocrine, skin, SHEENT, genitourinary, psychiatric and neurologic systems was obtained and is unremarkable except high blood pressure    EXAMINATION:   Visit Vitals  /82   Pulse (!) 51   Ht 5' 10\" (1.778 m)   Wt 169 lb (76.7 kg)   SpO2 98%   BMI 24.25 kg/m²        General:   General appearance: Pt is in no acute distress   Distal pulses are preserved    Neurological Examination:   Mental Status: AAO x3. Speech is fluent. Follows commands, has normal fund of knowledge, attention, short term recall, comprehension and insight. Cranial Nerves: Visual fields are full. PERRL, Extraocular movements are full. Facial sensation intact. Facial movement intact. Hearing intact to conversation. Palate elevates symmetrically. Shoulder shrug symmetric. Tongue midline. Motor: Strength is 5/5 in all 4 ext. Sensation: Light touch - Normal    Coordination/Cerebellar: Intact to finger-nose-finger     Skin: No significant bruising or lacerations.     Laboratory review:   Results for orders placed or performed during the hospital encounter of 01/01/20   CBC WITH AUTOMATED DIFF   Result Value Ref Range    WBC 6.5 4.1 - 11.1 K/uL    RBC 4.72 4.10 - 5.70 M/uL    HGB 14.4 12.1 - 17.0 g/dL    HCT 43.4 36.6 - 50.3 %    MCV 91.9 80.0 - 99.0 FL    MCH 30.5 26.0 - 34.0 PG    MCHC 33.2 30.0 - 36.5 g/dL    RDW 13.2 11.5 - 14.5 %    PLATELET 484 410 - 537 K/uL    MPV 9.6 8.9 - 12.9 FL    NRBC 0.0 0  WBC    ABSOLUTE NRBC 0.00 0.00 - 0.01 K/uL    NEUTROPHILS 56 32 - 75 %    LYMPHOCYTES 29 12 - 49 %    MONOCYTES 9 5 - 13 %    EOSINOPHILS 5 0 - 7 %    BASOPHILS 1 0 - 1 %    IMMATURE GRANULOCYTES 0 0.0 - 0.5 %    ABS. NEUTROPHILS 3.7 1.8 - 8.0 K/UL    ABS. LYMPHOCYTES 1.9 0.8 - 3.5 K/UL    ABS. MONOCYTES 0.6 0.0 - 1.0 K/UL    ABS. EOSINOPHILS 0.3 0.0 - 0.4 K/UL    ABS. BASOPHILS 0.1 0.0 - 0.1 K/UL    ABS. IMM. GRANS. 0.0 0.00 - 0.04 K/UL    DF AUTOMATED     METABOLIC PANEL, COMPREHENSIVE   Result Value Ref Range    Sodium 140 136 - 145 mmol/L    Potassium 3.7 3.5 - 5.1 mmol/L    Chloride 110 (H) 97 - 108 mmol/L    CO2 25 21 - 32 mmol/L    Anion gap 5 5 - 15 mmol/L    Glucose 96 65 - 100 mg/dL    BUN 13 6 - 20 MG/DL    Creatinine 1.04 0.70 - 1.30 MG/DL    BUN/Creatinine ratio 13 12 - 20      GFR est AA >60 >60 ml/min/1.73m2    GFR est non-AA >60 >60 ml/min/1.73m2    Calcium 9.0 8.5 - 10.1 MG/DL    Bilirubin, total 0.2 0.2 - 1.0 MG/DL    ALT (SGPT) 29 12 - 78 U/L    AST (SGOT) 24 15 - 37 U/L    Alk.  phosphatase 122 (H) 45 - 117 U/L    Protein, total 7.2 6.4 - 8.2 g/dL    Albumin 3.7 3.5 - 5.0 g/dL    Globulin 3.5 2.0 - 4.0 g/dL    A-G Ratio 1.1 1.1 - 2.2     TROPONIN I   Result Value Ref Range    Troponin-I, Qt. <0.05 <0.05 ng/mL   URINALYSIS W/ RFLX MICROSCOPIC   Result Value Ref Range    Color YELLOW/STRAW      Appearance CLEAR CLEAR      Specific gravity 1.011 1.003 - 1.030      pH (UA) 6.0 5.0 - 8.0      Protein NEGATIVE  NEG mg/dL    Glucose NEGATIVE  NEG mg/dL    Ketone NEGATIVE  NEG mg/dL    Bilirubin NEGATIVE  NEG      Blood NEGATIVE  NEG      Urobilinogen 0.2 0.2 - 1.0 EU/dL    Nitrites NEGATIVE  NEG      Leukocyte Esterase NEGATIVE  NEG     LIPID PANEL   Result Value Ref Range    LIPID PROFILE          Cholesterol, total 162 <200 MG/DL    Triglyceride 101 <150 MG/DL    HDL Cholesterol 51 MG/DL    LDL, calculated 90.8 0 - 100 MG/DL    VLDL, calculated 20.2 MG/DL    CHOL/HDL Ratio 3.2 0.0 - 5.0     HEMOGLOBIN A1C WITH EAG   Result Value Ref Range    Hemoglobin A1c 5.9 (H) 4.0 - 5.6 %    Est. average glucose 123 mg/dL   SED RATE (ESR)   Result Value Ref Range    Sed rate, automated 6 0 - 20 mm/hr   CRP, HIGH SENSITIVITY   Result Value Ref Range    CRP, High sensitivity 0.7 mg/L   TSH 3RD GENERATION   Result Value Ref Range    TSH 1.19 0.36 - 3.74 uIU/mL   T3, FREE   Result Value Ref Range    Free Triiodothyronine (T3) 2.2 2.2 - 4.0 pg/mL   T4, FREE   Result Value Ref Range    T4, Free 0.7 (L) 0.8 - 1.5 NG/DL   CBC WITH AUTOMATED DIFF   Result Value Ref Range    WBC 6.3 4.1 - 11.1 K/uL    RBC 4.67 4.10 - 5.70 M/uL    HGB 14.2 12.1 - 17.0 g/dL    HCT 43.7 36.6 - 50.3 %    MCV 93.6 80.0 - 99.0 FL    MCH 30.4 26.0 - 34.0 PG    MCHC 32.5 30.0 - 36.5 g/dL    RDW 13.3 11.5 - 14.5 %    PLATELET 316 662 - 582 K/uL    MPV 9.7 8.9 - 12.9 FL    NRBC 0.0 0  WBC    ABSOLUTE NRBC 0.00 0.00 - 0.01 K/uL    NEUTROPHILS 58 32 - 75 %    LYMPHOCYTES 26 12 - 49 %    MONOCYTES 9 5 - 13 %    EOSINOPHILS 6 0 - 7 %    BASOPHILS 1 0 - 1 %    IMMATURE GRANULOCYTES 0 0.0 - 0.5 %    ABS. NEUTROPHILS 3.7 1.8 - 8.0 K/UL    ABS. LYMPHOCYTES 1.6 0.8 - 3.5 K/UL    ABS. MONOCYTES 0.6 0.0 - 1.0 K/UL    ABS. EOSINOPHILS 0.4 0.0 - 0.4 K/UL    ABS. BASOPHILS 0.1 0.0 - 0.1 K/UL    ABS. IMM.  GRANS. 0.0 0.00 - 0.04 K/UL    DF AUTOMATED     MAGNESIUM   Result Value Ref Range    Magnesium 2.2 1.6 - 2.4 mg/dL   METABOLIC PANEL, COMPREHENSIVE   Result Value Ref Range    Sodium 142 136 - 145 mmol/L    Potassium 3.6 3.5 - 5.1 mmol/L    Chloride 112 (H) 97 - 108 mmol/L    CO2 25 21 - 32 mmol/L    Anion gap 5 5 - 15 mmol/L    Glucose 121 (H) 65 - 100 mg/dL    BUN 13 6 - 20 MG/DL    Creatinine 1.10 0.70 - 1.30 MG/DL    BUN/Creatinine ratio 12 12 - 20      GFR est AA >60 >60 ml/min/1.73m2    GFR est non-AA >60 >60 ml/min/1.73m2    Calcium 8.3 (L) 8.5 - 10.1 MG/DL    Bilirubin, total 0.7 0.2 - 1.0 MG/DL    ALT (SGPT) 27 12 - 78 U/L    AST (SGOT) 22 15 - 37 U/L    Alk.  phosphatase 106 45 - 117 U/L    Protein, total 6.4 6.4 - 8.2 g/dL    Albumin 3.2 (L) 3.5 - 5.0 g/dL    Globulin 3.2 2.0 - 4.0 g/dL    A-G Ratio 1.0 (L) 1.1 - 2.2     BERNADETTE BY MULTIPLEX FLOW IA, QL   Result Value Ref Range    BERNADETTE, Direct NEGATIVE  NEGATIVE     SED RATE (ESR)   Result Value Ref Range    Sed rate, automated 9 0 - 20 mm/hr   VITAMIN B12   Result Value Ref Range    Vitamin B12 176 (L) 193 - 986 pg/mL   FOLATE   Result Value Ref Range    Folate 19.6 5.0 - 21.0 ng/mL   VITAMIN D, 25 HYDROXY   Result Value Ref Range    Vitamin D 25-Hydroxy 30.5 30 - 100 ng/mL   GLUCOSE, POC   Result Value Ref Range    Glucose (POC) 79 65 - 100 mg/dL    Performed by Naresh Javed (Traveler)    POC CREATININE   Result Value Ref Range    Creatinine (POC) 1.2 0.6 - 1.3 mg/dL    GFRAA, POC >60 >60 ml/min/1.73m2    GFRNA, POC 59 (L) >60 ml/min/1.73m2   GLUCOSE, POC   Result Value Ref Range    Glucose (POC) 116 (H) 65 - 100 mg/dL    Performed by Lizabeth Simmonds (RN)    GLUCOSE, POC   Result Value Ref Range    Glucose (POC) 113 (H) 65 - 100 mg/dL    Performed by Paulina Manzanares (PCT)    EKG, 12 LEAD, INITIAL   Result Value Ref Range    Ventricular Rate 64 BPM    Atrial Rate 64 BPM    P-R Interval 200 ms    QRS Duration 74 ms    Q-T Interval 408 ms    QTC Calculation (Bezet) 420 ms    Calculated P Axis 61 degrees    Calculated R Axis 53 degrees    Calculated T Axis 59 degrees    Diagnosis       Normal sinus rhythm  Normal ECG  No significant change was found  Confirmed by Laura Galicia ([id number]) on 1/2/2020 10:44:20 AM     EKG, 12 LEAD, INITIAL   Result Value Ref Range    Ventricular Rate 59 BPM    Atrial Rate 59 BPM    P-R Interval 194 ms    QRS Duration 78 ms    Q-T Interval 404 ms    QTC Calculation (Bezet) 399 ms    Calculated P Axis 77 degrees    Calculated R Axis 69 degrees    Calculated T Axis 69 degrees    Diagnosis       Sinus bradycardia with premature atrial complexes  Loss of anteroseptal forces  No significant change was found    Confirmed by Mukund Mac ([id number]) on 1/2/2020 10:44:39 AM     DUPLEX CAROTID BILATERAL   Result Value Ref Range    Right subclavian sys 97.1 cm/s    RIGHT SUBCLAVIAN ARTERY D 0.00 cm/s    Right cca dist sys 51.5 cm/s    Right CCA dist babb 15.8 cm/s    Right CCA prox sys 63.4 cm/s    Right CCA prox babb 15.1 cm/s    Right ICA dist sys 85.4 cm/s    Right ICA dist babb 21.7 cm/s    Right ICA mid sys 118.3 cm/s    Right ICA mid babb 48.1 cm/s    Right ICA prox sys 55.1 cm/s    Right ICA prox babb 22.0 cm/s    Right eca sys 72.2 cm/s    RIGHT EXTERNAL CAROTID ARTERY D 8.60 cm/s    Right vertebral sys 44.1 cm/s    RIGHT VERTEBRAL ARTERY D 12.20 cm/s    Right ICA/CCA sys 2.3     Left subclavian sys 118.5 cm/s    LEFT SUBCLAVIAN ARTERY D 0.00 cm/s    Left CCA dist sys 65.6 cm/s    Left CCA dist babb 21.7 cm/s    Left CCA prox sys 65.6 cm/s    Left CCA prox babb 19.9 cm/s    Left ICA dist sys 96.6 cm/s    Left ICA dist babb 27.2 cm/s    Left ICA mid sys 100.3 cm/s    Left ICA mid babb 34.5 cm/s    Left ICA prox sys 105.8 cm/s    Left ICA prox babb 38.2 cm/s    Left ECA sys 63.7 cm/s    LEFT EXTERNAL CAROTID ARTERY D 10.80 cm/s    Left vertebral sys 40.0 cm/s    LEFT VERTEBRAL ARTERY D 12.60 cm/s    Left ICA/CCA sys 1.60    ECHO ADULT COMPLETE   Result Value Ref Range    LV E' Lateral Velocity 11.44 cm/s    LV E' Septal Velocity 7.80 cm/s    Ao Root D 3.66 cm    Aortic Valve Systolic Peak Velocity 77.33 cm/s    Aortic Valve Area by Continuity of Peak Velocity 2.5 cm2    AoV PG 3.9 mmHg    LVIDd 4.02 (A) 4.2 - 5.9 cm    LVPWd 1.01 (A) 0.6 - 1.0 cm    LVIDs 3.26 cm    IVSd 0.83 0.6 - 1.0 cm    LVOT d 2.09 cm    LVOT Peak Velocity 72.13 cm/s    LVOT Peak Gradient 2.1 mmHg    LVOT VTI 14.77 cm    MV A William 76.31 cm/s    MV E William 58.47 cm/s    MV E/A 0.77     Left Atrium to Aortic Root Ratio 0.53     LA Vol 4C 17.72 (A) 18 - 58 mL    LA Area 4C 9.4 cm2    LV Mass .6 88 - 224 g    LV Mass AL Index 64.7 49 - 115 g/m2    LVPWs 0.89 cm    E/E' lateral 5.11     E/E' septal 7.50     RVSP 34.2 mmHg    E/E' ratio (averaged) 6.30     Est. RA Pressure 10.0 mmHg    Mitral Regurgitant Peak Velocity 88.29 cm/s    Mitral Valve E Wave Deceleration Time 267.5 ms    Left Atrium Major Axis 1.94 cm    Triscuspid Valve Regurgitation Peak Gradient 24.2 mmHg    LVOT SV 50.4 ml    TR Max Velocity 246.12 cm/s    PASP 34.2 mmHg    LA Vol Index 8.92 16 - 28 ml/m2    MR Peak Gradient 3.1 mmHg    ELIF/BSA Pk William 1.3 cm2/m2    Left Ventricular Fractional Shortening by 2D 26.802486496 %    Left Ventricular Outflow Tract Mean Gradient 2.60077580369370 mmHg    Left Ventricular Outflow Tract Mean Velocity 2.8311883211673 cm/s    Mitral Valve Deceleration [place] 1.549371819609     AV Velocity Ratio 0.73     Left Ventricular End Diastolic Volume by Teichholz Method 9.97981107779 mL    Left Ventricular End Systolic Volume by Teichholz Method 5.71577156211 mL    Left Ventricular Stroke Volume by [place] Method 07.142001795 mL     Stroke labs:  HgBA1c    Lab Results   Component Value Date/Time    Hemoglobin A1c 5.9 (H) 01/01/2020 06:15 PM     LDL   Lab Results   Component Value Date/Time    LDL, calculated 90.8 01/01/2020 06:15 PM      Imaging review:  1/2/2020  MRI brain without contrast  Normal    Carotid ultrasound  Less than 50% bilateral ICA stenosis    Transthoracic echo  Ejection fraction 50 to 55%    Documentation review:   I requested records from [employer] providers in preparation for my face-to-face visit with him today regarding him presenting complaint.   I spent 35 minutes performing a non-face-to-face review of these records and they are summarized below. I reviewed the discharge summary from the recent hospitalization. The patient was admitted to the hospital on 1/1/2020 and discharged on 1/2/2020. The patient presented because of left leg weakness and this is the third episode of left leg weakness. He did have associated tingling in the left arm and the left leg but that has resolved. Episode lasted for around 20 minutes. Patient was seen by Dr. Misty Osborne during the hospitalization. Patient has had around 3 episodes in the last 1 week. Patient's MRI of the brain was normal and CT angiogram of the head and neck were normal as well. The patient was taking aspirin and that was switched to Plavix 75 mg daily. Assessment/Plan:   1. TIA (transient ischemic attack)  The patient did have a TIA. Left-sided numbness and weakness that was on 1 January 2020. Patient in fact had 3 episodes over 1 week at that time. I had seen him in 2017 and he did have right arm numbness. Patient was taking aspirin 81 mg a day since then and after the recent episode, it was switched to Plavix 75 mg daily. There has not been any recurrence of symptoms. We will continue the same    2. Essential hypertension  Patient's blood pressure is 132/82. Goal blood pressure is less than 140/90. Blood pressure is under good control. Continue. 3. Mixed hyperlipidemia  Patient does have hyperlipidemia and is on Lipitor 40 mg daily. Continue the same. Goal LDL is less than 70.    4. B12 deficiency  Patient's vitamin B12 is 176. Patient takes oral vitamin B12 1000 mcg every day. Continue the same. To be followed up by primary care physician.     Follow-up in 6 months      3 most recent PHQ Screens 1/6/2020   Little interest or pleasure in doing things Not at all   Feeling down, depressed, irritable, or hopeless Not at all   Total Score PHQ 2 0     Primary care to address possible depression if PHQ-9 score is more than 9. ICD-10-CM ICD-9-CM    1. TIA (transient ischemic attack) G45.9 435.9    2. Essential hypertension I10 401.9    3. Mixed hyperlipidemia E78.2 272.2    4. B12 deficiency E53.8 266.2         Electronically signed. Bea Pastrana MD  Neurologist    CC: Ame Araya MD  Fax: [phone]    This note was created using voice recognition software. Despite editing, there may be syntax errors.

## 2020-02-06 ENCOUNTER — PATIENT OUTREACH (OUTPATIENT)
Dept: INTERNAL MEDICINE CLINIC | Age: 75
End: 2020-02-06

## 2020-02-06 NOTE — PROGRESS NOTES
Patient has graduated from the Transitions of Care Coordination  program on 02/06/20. Patient was not referred to the [facility] team for further management. Goals Addressed                 This Visit's Progress     COMPLETED: Prevent complications post hospitalization. 01/07/20    - Spoke to patient today. Patient lives with his wife who is able to assist him if needed  - attended PCP f/u yesterday, patient stated \"Dr. Hayden Rangel filled in the blanks for me\"  - PCP f/u scheduled for [facility] 2/11  - Neuro Dr. Oliveira Sender 1/29  - confirmed medications  - Patient now on Plavix - reviewed bleeding precautions such as using electric razor, may bleed and bruise easier, use caution when using sharp objects.   - Reviewed red flag s/s with patient, nausea, vomiting, inability to pass urine/stool, SOB, mental status change, chest pain, fever.   -  Reviewed FAST with patient and signs of CVA, such as sudden numbness, tingling, loss of movement in your face, arm, leg, especially on one side of the body, vision changes, trouble speaking, confusion, walking or balance problems and sudden headache.   - declined a need for home health   - [facility] provided as a resource    Patient will contact Md/CTN if red flag s/s arise. CTN to w/u ~ 7-10 days. AR      01/23/20  - LM for patient call to CTN for f/u ADELINA call. AR    02/06/20  - patient attended neuro appt on 1/29. AR            Patient has Care Transition Nurse's contact information for any further questions, concerns, or needs.   Patients upcoming visits:    Future Appointments   Date Time Provider Bertrand Martini   2/11/2020  8:45 AM LAB [id number] Yarely Gallegos   2/18/2020  8:45 AM Raymond Rucker MD [facility]   7/29/2020 10:00 AM MD AILYN Rai/ [address]

## 2020-02-11 DIAGNOSIS — E78.00 HYPERCHOLESTEROLEMIA: ICD-10-CM

## 2020-02-13 LAB
CHOLEST SERPL-MCNC: 112 MG/DL (ref 100–199)
HDLC SERPL-MCNC: 44 MG/DL
LDLC SERPL CALC-MCNC: 53 MG/DL (ref 0–99)
TRIGL SERPL-MCNC: 76 MG/DL (ref 0–149)
VLDLC SERPL CALC-MCNC: 15 MG/DL (ref 5–40)

## 2020-02-17 PROBLEM — R73.03 PREDIABETES: Status: ACTIVE | Noted: 2020-02-17

## 2020-02-17 PROBLEM — E53.8 B12 DEFICIENCY: Status: ACTIVE | Noted: 2020-02-17

## 2020-02-17 PROBLEM — Z86.73 HISTORY OF TIA (TRANSIENT ISCHEMIC ATTACK): Status: ACTIVE | Noted: 2020-01-02

## 2020-02-18 ENCOUNTER — OFFICE VISIT (OUTPATIENT)
Dept: INTERNAL MEDICINE CLINIC | Facility: CLINIC | Age: 75
End: 2020-02-18

## 2020-02-18 VITALS
RESPIRATION RATE: 12 BRPM | TEMPERATURE: 97.7 F | HEIGHT: 70 IN | OXYGEN SATURATION: 96 % | DIASTOLIC BLOOD PRESSURE: 83 MMHG | WEIGHT: 171 LBS | HEART RATE: 68 BPM | SYSTOLIC BLOOD PRESSURE: 129 MMHG | BODY MASS INDEX: 24.48 KG/M2

## 2020-02-18 DIAGNOSIS — J44.9 COPD, MODERATE (HCC): ICD-10-CM

## 2020-02-18 DIAGNOSIS — I10 HYPERTENSION, ESSENTIAL: Primary | ICD-10-CM

## 2020-02-18 DIAGNOSIS — E53.8 B12 DEFICIENCY: ICD-10-CM

## 2020-02-18 DIAGNOSIS — Z86.73 HISTORY OF TIA (TRANSIENT ISCHEMIC ATTACK): ICD-10-CM

## 2020-02-18 DIAGNOSIS — R73.03 PREDIABETES: ICD-10-CM

## 2020-02-18 DIAGNOSIS — E78.00 HYPERCHOLESTEROLEMIA: ICD-10-CM

## 2020-02-18 NOTE — PROGRESS NOTES
Kapil Ohm  Identified pt with two pt identifiers(name and ). Chief Complaint   Patient presents with    Hypertension    Cholesterol Problem       Reviewed record In preparation for visit and have obtained necessary documentation. Advanced directive / living will on file. 1. Have you been to the ER, urgent care clinic or hospitalized since your last visit? No     2. Have you seen or consulted any other health care providers outside of the [address] [place] [address] since your last visit? Include any pap smears or colon screening. Dr Diaz Rome reviewed with provider. Health Maintenance reviewed: There are no preventive care reminders to display for this patient.        Wt Readings from Last 3 Encounters:   20 171 lb (77.6 kg)   20 169 lb (76.7 kg)   20 168 lb (76.2 kg)        Temp Readings from Last 3 Encounters:   20 97.7 °F (36.5 °C) (Oral)   20 97.6 °F (36.4 °C) (Oral)   20 98 °F (36.7 °C)        BP Readings from Last 3 Encounters:   20 129/83   20 132/82   20 119/83        Pulse Readings from Last 3 Encounters:   20 68   20 (!) 51   20 75        Vitals:    20 0833   BP: 129/83   Pulse: 68   Resp: 12   Temp: 97.7 °F (36.5 °C)   TempSrc: Oral   SpO2: 96%   Weight: 171 lb (77.6 kg)   Height: 5' 10\" (1.778 m)   PainSc:   0 - No pain          Learning Assessment:   :       Learning Assessment 2017   PRIMARY LEARNER Patient Patient Patient   HIGHEST LEVEL OF EDUCATION - PRIMARY LEARNER  - - DID NOT GRADUATE HIGH SCHOOL   BARRIERS PRIMARY LEARNER - - NONE   CO-LEARNER CAREGIVER - - No   PRIMARY LANGUAGE ENGLISH ENGLISH ENGLISH   LEARNER PREFERENCE PRIMARY DEMONSTRATION DEMONSTRATION READING   ANSWERED BY patient patient self   RELATIONSHIP SELF SELF SELF        Depression Screening:   :       3 most recent PHQ Screens 2020   Little interest or pleasure in doing things Not at all   Feeling down, depressed, irritable, or hopeless Not at all   Total Score PHQ 2 0        Fall Risk Assessment:   :       Fall Risk Assessment, last 12 mths 8/26/2019   Able to walk? Yes   Fall in past 12 months? No   Fall with injury? -   Number of falls in past 12 months -   Fall Risk Score -        Abuse Screening:   :       Abuse Screening Questionnaire 8/26/2019 10/15/2018 10/24/2017 10/11/2016 9/10/2015 9/17/2014   Do you ever feel afraid of your partner? N N N N N N   Are you in a relationship with someone who physically or mentally threatens you? N N N N N N   Is it safe for you to go home?  Y Y Y Y Y Y        ADL Screening:   :       ADL Assessment 1/6/2020   Feeding yourself No Help Needed   Getting from bed to chair No Help Needed   Getting dressed No Help Needed   Bathing or showering No Help Needed   Walk across the room (includes cane/walker) No Help Needed   Using the telphone No Help Needed   Taking your medications No Help Needed   Preparing meals No Help Needed   Managing money (expenses/bills) No Help Needed   Moderately strenuous housework (laundry) No Help Needed   Shopping for personal items (toiletries/medicines) No Help Needed   Shopping for groceries No Help Needed   Driving No Help Needed   Climbing a flight of stairs No Help Needed   Getting to places beyond walking distances No Help Needed

## 2020-02-18 NOTE — PROGRESS NOTES
HISTORY OF PRESENT ILLNESS  Bee Esparza is a 76 y.o. male. HPI   He presents for follow up of hypertension, hyperlipidemia and carotid stenosis with history of TIA. He also has B12 deficiency. Diet and Lifestyle: generally follows a low fat low cholesterol diet, generally follows a low sodium diet, exercises sporadically, nonsmoker, alcohol intake none  Medication compliance: compliant all of the time  Medication side effects: none  Home BP Monitoring: not done  Cardiovascular ROS:  He denies palpitations, orthopnea, exertional chest pressure/discomfort, claudication, lower extremity edema, dyspnea on exertion, dizziness     He is being seen for follow up of COPD. Has a little bit of wheezing, but only lasts a few minutes. Cold air triggers it. He is taking medications as instructed, no significant ongoing wheezing or shortness of breath, using bronchodilator MDI less than twice a week.    Uses rescue inhaler:0  times /week      Patient Active Problem List   Diagnosis Code    Hypercholesterolemia E78.00    Hypertension, essential I10    S/P colonoscopy K41.237    ED (erectile dysfunction) N52.9    Actinic keratosis L57.0    COPD, moderate (HCC) J44.9    Bilateral carotid artery stenosis I65.23    History of TIA (transient ischemic attack) Z86.73    Disturbance of memory R41.3    Prediabetes R73.03    B12 deficiency E53.8     Past Medical History:   Diagnosis Date    Arthritis     knee    Cancer ([facility] [address]     skin    Chronic obstructive pulmonary disease ([facility] [address]     Hypercholesterolemia     Hypertension     Ill-defined condition     lipids    Smoking 1/22/2010    Stopped 04/2000      Past Surgical History:   Procedure Laterality Date    COLORECTAL SCRN; HI RISK IND  1/12/2016         ENDOSCOPY, COLON, DIAGNOSTIC  07/16/2007    Dr Rea Cool polyp repeat 07/2010    HX ORTHOPAEDIC Right 3/2016    arthroscopic; torn medial meniscus     Social History     Socioeconomic History    Marital status:      Spouse name: Not on file    Number of children: 3    Years of education: Not on file    Highest education level: Not on file   Occupational History    Occupation: construction   Tobacco Use    Smoking status: Former Smoker     Packs/day: 2.00     Years: 40.00     Pack years: 80.00     Last attempt to quit: 2000     Years since quittin.8    Smokeless tobacco: Never Used   Substance and Sexual Activity    Alcohol use: Yes     Alcohol/week: 0.0 standard drinks     Types: 2 - 3 Cans of beer per week     Comment: Soc    Drug use: No     Family History   Problem Relation Age of Onset    Hypertension Mother     Thyroid Disease Mother     Stroke Mother     Kidney Disease Father     Cancer Father         skin    Diabetes Father     Neuropathy Brother     Cancer Paternal Aunt         lung    Cancer Paternal Uncle         skin    Diabetes Maternal Grandmother     Hypertension Child      Allergies   Allergen Reactions    Macrobid [Nitrofurantoin Monohyd/M-Cryst] Unknown (comments)    Tetracycline Unknown (comments)     Current Outpatient Medications   Medication Sig Dispense Refill    cyanocobalamin 1,000 mcg tablet Take 1,000 mcg by mouth daily.  atorvastatin (LIPITOR) 40 mg tablet Take 1 Tab by mouth nightly. 90 Tab 3    clopidogrel (PLAVIX) 75 mg tab Take 1 Tab by mouth daily. 90 Tab 3    tadalafil (CIALIS) 5 mg tablet TAKE 1 TABLET daily 30 Tab 5    albuterol (PROVENTIL HFA, VENTOLIN HFA, PROAIR HFA) 90 mcg/actuation inhaler Take 2 Puffs by inhalation every four (4) hours as needed for Wheezing. 1 Inhaler 0    lisinopril (PRINIVIL, ZESTRIL) 20 mg tablet TAKE 1 TABLET DAILY 90 Tab 3         Review of Systems   Constitutional: Negative for malaise/fatigue and weight loss. Gastrointestinal: Negative for constipation and diarrhea. Musculoskeletal: Positive for joint pain (right shoulder). Negative for back pain. Neurological: Negative for tingling and focal weakness. Visit Vitals  /83 (BP 1 Location: Left arm, BP Patient Position: Sitting)   Pulse 68   Temp 97.7 °F (36.5 °C) (Oral)   Resp 12   Ht 5' 10\" (1.778 m)   Wt 171 lb (77.6 kg)   SpO2 96%   BMI 24.54 kg/m²     Visit Vitals  /83 (BP 1 Location: Left arm, BP Patient Position: Sitting)   Pulse 68   Temp 97.7 °F (36.5 °C) (Oral)   Resp 12   Ht 5' 10\" (1.778 m)   Wt 171 lb (77.6 kg)   SpO2 96%   BMI 24.54 kg/m²     Physical Exam  Vitals signs and nursing note reviewed. Constitutional:       Appearance: Normal appearance. He is well-developed. HENT:      Head: Normocephalic and atraumatic. Eyes:      Conjunctiva/sclera: Conjunctivae normal.      Pupils: Pupils are equal, round, and reactive to light. Neck:      Musculoskeletal: Neck supple. Thyroid: No thyromegaly. Vascular: No carotid bruit. Cardiovascular:      Rate and Rhythm: Normal rate and regular rhythm. Chest Wall: PMI is not displaced. Pulses:           Dorsalis pedis pulses are 2+ on the right side and 2+ on the left side. Posterior tibial pulses are 2+ on the right side and 2+ on the left side. Heart sounds: Normal heart sounds. No murmur. No gallop. Pulmonary:      Effort: Pulmonary effort is normal.      Breath sounds: No wheezing, rhonchi or rales. Abdominal:      General: Bowel sounds are normal. There is no distension or abdominal bruit. Palpations: Abdomen is soft. There is no hepatomegaly, splenomegaly or mass. Tenderness: There is no abdominal tenderness. Musculoskeletal:      Right lower leg: No edema. Left lower leg: No edema. Lymphadenopathy:      Cervical: No cervical adenopathy. Upper Body:      Right upper body: No supraclavicular adenopathy. Left upper body: No supraclavicular adenopathy. Skin:     General: Skin is warm and dry. Findings: No rash. Neurological:      Mental Status: He is alert and oriented to person, place, and time.       Sensory: No sensory deficit. Motor: Motor function is intact. Gait: Gait is intact. Psychiatric:         Attention and Perception: Attention normal.         Mood and Affect: Mood normal.         Speech: Speech normal.         Behavior: Behavior normal.       Lab Results   Component Value Date/Time    Hemoglobin A1c 5.9 (H) 01/01/2020 06:15 PM     Lab Results   Component Value Date/Time    Vitamin B12 176 (L) 01/02/2020 11:09 AM    Folate 19.6 01/02/2020 11:09 AM     Lab Results   Component Value Date/Time    Cholesterol, total 112 02/12/2020 10:27 AM    HDL Cholesterol 44 02/12/2020 10:27 AM    LDL, calculated 53 02/12/2020 10:27 AM    VLDL, calculated 15 02/12/2020 10:27 AM    Triglyceride 76 02/12/2020 10:27 AM    CHOL/HDL Ratio 3.2 01/01/2020 06:15 PM     Lab Results   Component Value Date/Time    Sodium 142 01/02/2020 03:54 AM    Potassium 3.6 01/02/2020 03:54 AM    Chloride 112 (H) 01/02/2020 03:54 AM    CO2 25 01/02/2020 03:54 AM    Anion gap 5 01/02/2020 03:54 AM    Glucose 121 (H) 01/02/2020 03:54 AM    BUN 13 01/02/2020 03:54 AM    Creatinine 1.10 01/02/2020 03:54 AM    BUN/Creatinine ratio 12 01/02/2020 03:54 AM    GFR est AA >60 01/02/2020 03:54 AM    GFR est non-AA >60 01/02/2020 03:54 AM    Calcium 8.3 (L) 01/02/2020 03:54 AM    Bilirubin, total 0.7 01/02/2020 03:54 AM    AST (SGOT) 22 01/02/2020 03:54 AM    Alk. phosphatase 106 01/02/2020 03:54 AM    Protein, total 6.4 01/02/2020 03:54 AM    Albumin 3.2 (L) 01/02/2020 03:54 AM    Globulin 3.2 01/02/2020 03:54 AM    A-G Ratio 1.0 (L) 01/02/2020 03:54 AM    ALT (SGPT) 27 01/02/2020 03:54 AM       ASSESSMENT and PLAN    ICD-10-CM ICD-9-CM    1. Hypertension, essential I10 401.9    2. Hypercholesterolemia A46.27 138.4 METABOLIC PANEL, COMPREHENSIVE   3. History of TIA (transient ischemic attack) Z86.73 V12.54    4. COPD, moderate ([facility] [address] J44.9 496    5. Prediabetes R73.03 790.29 HEMOGLOBIN A1C WITH EAG   6.  B12 deficiency E53.8 266.2 VITAMIN B12 Diagnoses and all orders for this visit:    1. Hypertension, essential  Hypertension is controlled. 2. Hypercholesterolemia  Hyperlipidemia is controlled  -     METABOLIC PANEL, COMPREHENSIVE; Future    3. History of TIA (transient ischemic attack)  Stable taking antiplatelet agent and statin. 4. COPD, moderate ([address]  Stable with minimal use of albuterol. 5. Prediabetes  Stable . -     HEMOGLOBIN A1C WITH EAG; Future    6. B12 deficiency  He thinks memory is a little better since starting replacement. -     VITAMIN B12; Future      Follow-up and Dispositions    · Return in about 6 months (around 8/18/2020) for HTN<,chol, gluc NON-fasting lab one week prior  . lab results and schedule of future lab studies reviewed with patient  reviewed diet, exercise and weight control  I have discussed the diagnosis, evaluation and treatment options and the intended plan with the patient. Patient understands and is in agreement. The patient has received an after-visit summary and questions were answered concerning future plans. I have discussed side effects and warnings of any new medications with the patient as well.

## 2020-02-19 RX ORDER — LISINOPRIL 20 MG/1
TABLET ORAL
Qty: 90 TAB | Refills: 4 | Status: SHIPPED | OUTPATIENT
Start: 2020-02-19 | End: 2021-05-11

## 2020-02-24 RX ORDER — TADALAFIL 5 MG/1
TABLET ORAL
Qty: 30 TAB | Refills: 0 | Status: SHIPPED | OUTPATIENT
Start: 2020-02-24 | End: 2020-10-12 | Stop reason: SDUPTHER

## 2020-02-24 NOTE — TELEPHONE ENCOUNTER
PCP: Tobin Coelho MD     Last appt: 2/18/2020   Future Appointments   Date Time Provider Bertrand Martini   7/29/2020 10:00 AM Juan Vines MD [address]        Requested Prescriptions     Pending Prescriptions Disp Refills    tadalafil (CIALIS) 5 mg tablet 30 Tab 5     Sig: TAKE 1 TABLET daily

## 2020-07-29 ENCOUNTER — OFFICE VISIT (OUTPATIENT)
Dept: NEUROLOGY | Age: 75
End: 2020-07-29

## 2020-07-29 VITALS
OXYGEN SATURATION: 98 % | BODY MASS INDEX: 23.19 KG/M2 | DIASTOLIC BLOOD PRESSURE: 70 MMHG | SYSTOLIC BLOOD PRESSURE: 110 MMHG | HEART RATE: 63 BPM | WEIGHT: 162 LBS | HEIGHT: 70 IN

## 2020-07-29 DIAGNOSIS — G45.9 TIA (TRANSIENT ISCHEMIC ATTACK): Primary | ICD-10-CM

## 2020-07-29 DIAGNOSIS — I10 ESSENTIAL HYPERTENSION: ICD-10-CM

## 2020-07-29 DIAGNOSIS — E78.2 MIXED HYPERLIPIDEMIA: ICD-10-CM

## 2020-07-29 NOTE — PROGRESS NOTES
Neurology follow-up note    Chief Complaint   Patient presents with    Follow-up     patient states having no symptoms    TIA       SUBJECTIVE  Layo Ross is a 76 y.o. male who presented to the neurology office for management of TIA    The patient was admitted to the hospital on 1/1/2020 and discharged on 1/2/2020. The patient presented because of left leg weakness and this is the third episode of left leg weakness. He did have associated tingling in the left arm and the left leg but that has resolved. Episode lasted for around 20 minutes. Patient was seen by Dr. Isaias Santos during the hospitalization. Patient has had around 3 episodes in the last 1 week. Patient's MRI of the brain was normal and CT angiogram of the head and neck were normal as well. The patient was taking aspirin and that was switched to Plavix 75 mg daily. Patient is doing very well with no recurrence of symptoms. Interval history:  The patient does have history of TIA, hyperlipidemia and is taking Plavix and Lipitor. No recurrence of symptoms. Risk factors are relatively well controlled. Current Outpatient Medications   Medication Sig    tadalafil (CIALIS) 5 mg tablet TAKE 1 TABLET daily    lisinopril (PRINIVIL, ZESTRIL) 20 mg tablet TAKE 1 TABLET DAILY    cyanocobalamin 1,000 mcg tablet Take 1,000 mcg by mouth daily.  atorvastatin (LIPITOR) 40 mg tablet Take 1 Tab by mouth nightly.  clopidogrel (PLAVIX) 75 mg tab Take 1 Tab by mouth daily.  albuterol (PROVENTIL HFA, VENTOLIN HFA, PROAIR HFA) 90 mcg/actuation inhaler Take 2 Puffs by inhalation every four (4) hours as needed for Wheezing. No current facility-administered medications for this visit.         Past Medical History:   Diagnosis Date    Arthritis     knee    Cancer ([facility] [address]     skin    Chronic obstructive pulmonary disease ([facility] [address]     Hypercholesterolemia     Hypertension     Ill-defined condition     lipids    Smoking 1/22/2010    Stopped 04/2000 Past Surgical History:   Procedure Laterality Date    COLORECTAL SCRN; HI RISK IND  2016         ENDOSCOPY, COLON, DIAGNOSTIC  2007    Dr Robby Andrade polyp repeat 2010    HX ORTHOPAEDIC Right 3/2016    arthroscopic; torn medial meniscus     Family History   Problem Relation Age of Onset    Hypertension Mother     Thyroid Disease Mother     Stroke Mother     Kidney Disease Father     Cancer Father         skin    Diabetes Father     Neuropathy Brother     Cancer Paternal Aunt         lung    Cancer Paternal Uncle         skin    Diabetes Maternal Grandmother     Hypertension Child      Social History     Tobacco Use    Smoking status: Former Smoker     Packs/day: 2.00     Years: 40.00     Pack years: 80.00     Last attempt to quit: 2000     Years since quittin.3    Smokeless tobacco: Never Used   Substance Use Topics    Alcohol use: Yes     Alcohol/week: 0.0 standard drinks     Types: 2 - 3 Cans of beer per week     Comment: Soc    Drug use: No       REVIEW OF SYSTEMS:   A ten system review of constitutional, cardiovascular, respiratory, musculoskeletal, endocrine, skin, SHEENT, genitourinary, psychiatric and neurologic systems was obtained and is unremarkable except high blood pressure    EXAMINATION:   Visit Vitals  /70   Pulse 63   Ht 5' 10\" (1.778 m)   Wt 162 lb (73.5 kg)   SpO2 98%   BMI 23.24 kg/m²        General:   General appearance: Pt is in no acute distress   Distal pulses are preserved    Neurological Examination:   Mental Status: AAO x3. Speech is fluent. Follows commands, has normal fund of knowledge, attention, short term recall, comprehension and insight. Cranial Nerves: Visual fields are full. PERRL, Extraocular movements are full. Facial sensation intact. Facial movement intact. Hearing intact to conversation. Palate elevates symmetrically. Shoulder shrug symmetric. Tongue midline. Motor: Strength is 5/5 in all 4 ext.     Sensation: Light touch - Normal    Coordination/Cerebellar: Intact to finger-nose-finger     Skin: No significant bruising or lacerations. Laboratory review:   Results for orders placed or performed in visit on 02/11/20   LIPID PANEL   Result Value Ref Range    Cholesterol, total 112 100 - 199 mg/dL    Triglyceride 76 0 - 149 mg/dL    HDL Cholesterol 44 >39 mg/dL    VLDL, calculated 15 5 - 40 mg/dL    LDL, calculated 53 0 - 99 mg/dL     Stroke labs:  HgBA1c    Lab Results   Component Value Date/Time    Hemoglobin A1c 5.9 (H) 01/01/2020 06:15 PM     LDL   Lab Results   Component Value Date/Time    LDL, calculated 53 02/12/2020 10:27 AM      Imaging review:  1/2/2020  MRI brain without contrast  Normal    Carotid ultrasound  Less than 50% bilateral ICA stenosis    Transthoracic echo  Ejection fraction 50 to 55%    Documentation review:   None    Assessment/Plan:   1. TIA (transient ischemic attack)  The patient did have a TIA. Left-sided numbness and weakness that was on 1 January 2020. Patient in fact had 3 episodes over 1 week at that time. I had seen him in 2017 and he did have right arm numbness. Patient was taking aspirin 81 mg a day since then and after the recent episode, it was switched to Plavix 75 mg daily. There has not been any recurrence of symptoms. We will continue the same    2. Essential hypertension  Goal blood pressure is less than 140/90. Blood pressure is under good control. Continue. 3. Mixed hyperlipidemia  Patient does have hyperlipidemia and is on Lipitor 40 mg daily. Continue the same. Goal LDL is less than 70.    4. B12 deficiency  Patient's vitamin B12 is 176. Patient takes oral vitamin B12 1000 mcg every day. Continue the same. To be followed up by primary care physician.     Follow-up as needed      3 most recent PHQ Screens 1/6/2020   Little interest or pleasure in doing things Not at all   Feeling down, depressed, irritable, or hopeless Not at all   Total Score PHQ 2 0     Primary care to address possible depression if PHQ-9 score is more than 9. ICD-10-CM ICD-9-CM    1. TIA (transient ischemic attack)  G45.9 435.9    2. Essential hypertension  I10 401.9    3. Mixed hyperlipidemia  E78.2 272.2         Electronically signed. Auburn Bumpers, MD  Neurologist    CC: Spencer Sifuentes MD  Fax: [phone]    This note was created using voice recognition software. Despite editing, there may be syntax errors.

## 2020-08-06 LAB
ALBUMIN SERPL-MCNC: 4.1 G/DL (ref 3.7–4.7)
ALBUMIN/GLOB SERPL: 1.7 {RATIO} (ref 1.2–2.2)
ALP SERPL-CCNC: 136 IU/L (ref 39–117)
ALT SERPL-CCNC: 26 IU/L (ref 0–44)
AST SERPL-CCNC: 23 IU/L (ref 0–40)
BILIRUB SERPL-MCNC: 0.5 MG/DL (ref 0–1.2)
BUN SERPL-MCNC: 14 MG/DL (ref 8–27)
BUN/CREAT SERPL: 13 (ref 10–24)
CALCIUM SERPL-MCNC: 9.2 MG/DL (ref 8.6–10.2)
CHLORIDE SERPL-SCNC: 104 MMOL/L (ref 96–106)
CO2 SERPL-SCNC: 25 MMOL/L (ref 20–29)
CREAT SERPL-MCNC: 1.09 MG/DL (ref 0.76–1.27)
EST. AVERAGE GLUCOSE BLD GHB EST-MCNC: 128 MG/DL
GLOBULIN SER CALC-MCNC: 2.4 G/DL (ref 1.5–4.5)
GLUCOSE SERPL-MCNC: 96 MG/DL (ref 65–99)
HBA1C MFR BLD: 6.1 % (ref 4.8–5.6)
POTASSIUM SERPL-SCNC: 4.4 MMOL/L (ref 3.5–5.2)
PROT SERPL-MCNC: 6.5 G/DL (ref 6–8.5)
SODIUM SERPL-SCNC: 143 MMOL/L (ref 134–144)
VIT B12 SERPL-MCNC: 686 PG/ML (ref 232–1245)

## 2020-08-07 NOTE — PROGRESS NOTES
Will discuss results at 8/17/20 clinic appt. Normal kidney and liver tests, normal vitamin B12. Prediabetes is a little worse than before (now 6.1%, was 5.9% in Jan 2020).

## 2020-08-17 ENCOUNTER — OFFICE VISIT (OUTPATIENT)
Dept: INTERNAL MEDICINE CLINIC | Age: 75
End: 2020-08-17
Payer: MEDICARE

## 2020-08-17 VITALS
HEIGHT: 70 IN | RESPIRATION RATE: 16 BRPM | HEART RATE: 69 BPM | TEMPERATURE: 97.7 F | OXYGEN SATURATION: 96 % | BODY MASS INDEX: 23.31 KG/M2 | DIASTOLIC BLOOD PRESSURE: 82 MMHG | WEIGHT: 162.8 LBS | SYSTOLIC BLOOD PRESSURE: 129 MMHG

## 2020-08-17 DIAGNOSIS — E53.8 B12 DEFICIENCY: ICD-10-CM

## 2020-08-17 DIAGNOSIS — Z13.31 SCREENING FOR DEPRESSION: ICD-10-CM

## 2020-08-17 DIAGNOSIS — Z86.73 HISTORY OF TIA (TRANSIENT ISCHEMIC ATTACK): ICD-10-CM

## 2020-08-17 DIAGNOSIS — R73.02 IGT (IMPAIRED GLUCOSE TOLERANCE): ICD-10-CM

## 2020-08-17 DIAGNOSIS — I10 HYPERTENSION, ESSENTIAL: ICD-10-CM

## 2020-08-17 DIAGNOSIS — Z00.00 MEDICARE ANNUAL WELLNESS VISIT, SUBSEQUENT: Primary | ICD-10-CM

## 2020-08-17 DIAGNOSIS — Z78.9 ADVANCE DIRECTIVE ON FILE: ICD-10-CM

## 2020-08-17 DIAGNOSIS — E78.00 HYPERCHOLESTEROLEMIA: ICD-10-CM

## 2020-08-17 PROCEDURE — G8754 DIAS BP LESS 90: HCPCS | Performed by: INTERNAL MEDICINE

## 2020-08-17 PROCEDURE — 1101F PT FALLS ASSESS-DOCD LE1/YR: CPT | Performed by: INTERNAL MEDICINE

## 2020-08-17 PROCEDURE — G8510 SCR DEP NEG, NO PLAN REQD: HCPCS | Performed by: INTERNAL MEDICINE

## 2020-08-17 PROCEDURE — 99215 OFFICE O/P EST HI 40 MIN: CPT | Performed by: INTERNAL MEDICINE

## 2020-08-17 PROCEDURE — G8427 DOCREV CUR MEDS BY ELIG CLIN: HCPCS | Performed by: INTERNAL MEDICINE

## 2020-08-17 PROCEDURE — G8752 SYS BP LESS 140: HCPCS | Performed by: INTERNAL MEDICINE

## 2020-08-17 PROCEDURE — G8420 CALC BMI NORM PARAMETERS: HCPCS | Performed by: INTERNAL MEDICINE

## 2020-08-17 PROCEDURE — G0444 DEPRESSION SCREEN ANNUAL: HCPCS | Performed by: INTERNAL MEDICINE

## 2020-08-17 PROCEDURE — 3017F COLORECTAL CA SCREEN DOC REV: CPT | Performed by: INTERNAL MEDICINE

## 2020-08-17 PROCEDURE — G8536 NO DOC ELDER MAL SCRN: HCPCS | Performed by: INTERNAL MEDICINE

## 2020-08-17 PROCEDURE — G0439 PPPS, SUBSEQ VISIT: HCPCS | Performed by: INTERNAL MEDICINE

## 2020-08-17 NOTE — PATIENT INSTRUCTIONS
Medicare Wellness Visit, Male The best way to live healthy is to have a lifestyle where you eat a well-balanced diet, exercise regularly, limit alcohol use, and quit all forms of tobacco/nicotine, if applicable. Regular preventive services are another way to keep healthy. Preventive services (vaccines, screening tests, monitoring & exams) can help personalize your care plan, which helps you manage your own care. Screening tests can find health problems at the earliest stages, when they are easiest to treat. [facility] follows the current, evidence-based guidelines published by the [facility] ([facility]) when recommending preventive services for our patients. Because we follow these guidelines, sometimes recommendations change over time as research supports it. (For example, a prostate screening blood test is no longer routinely recommended for men with no symptoms). Of course, you and your doctor may decide to screen more often for some diseases, based on your risk and co-morbidities (chronic disease you are already diagnosed with). Preventive services for you include: - Medicare offers their members a free annual wellness visit, which is time for you and your primary care provider to discuss and plan for your preventive service needs. Take advantage of this benefit every year! 
-All adults over age 72 should receive the recommended pneumonia vaccines. Current USPSTF guidelines recommend a series of two vaccines for the best pneumonia protection.  
-All adults should have a flu vaccine yearly and tetanus vaccine every 10 years. 
-All adults age 48 and older should receive the shingles vaccines (series of two vaccines).       
-All adults age 38-68 who are overweight should have a diabetes screening test once every three years.  
-Other screening tests & preventive services for persons with diabetes include: an eye exam to screen for diabetic retinopathy, a kidney function test, a foot exam, and stricter control over your cholesterol.  
-Cardiovascular screening for adults with routine risk involves an electrocardiogram (ECG) at intervals determined by the provider.  
-Colorectal cancer screening should be done for adults age 54-65 with no increased risk factors for colorectal cancer. There are a number of acceptable methods of screening for this type of cancer. Each test has its own benefits and drawbacks. Discuss with your provider what is most appropriate for you during your annual wellness visit. The different tests include: colonoscopy (considered the best screening method), a fecal occult blood test, a fecal DNA test, and sigmoidoscopy. 
-All adults born between [facility] should be screened once for Hepatitis C. 
-An Abdominal Aortic Aneurysm (AAA) Screening is recommended for men age 73-68 who has ever smoked in their lifetime. Here is a list of your current Health Maintenance items (your personalized list of preventive services) with a due date: 
Health Maintenance Due Topic Date Due  
 Flu Vaccine  08/01/2020  Glaucoma Screening   08/20/2020 Recombinant Zoster (Shingles) Vaccine: What You Need to Know Why get vaccinated? Recombinant zoster (shingles) vaccine can prevent shingles. Shingles (also called herpes zoster, or just zoster) is a painful skin rash, usually with blisters. In addition to the rash, shingles can cause fever, headache, chills, or upset stomach. More rarely, shingles can lead to pneumonia, hearing problems, blindness, brain inflammation (encephalitis), or death. The most common complication of shingles is long-term nerve pain called postherpetic neuralgia (PHN). PHN occurs in the areas where the shingles rash was, even after the rash clears up. It can last for months or years after the rash goes away. The pain from PHN can be severe and debilitating.  
About 10 to 18% of people who get shingles will experience PHN. The risk of PHN increases with age. An older adult with shingles is more likely to develop PHN and have longer lasting and more severe pain than a younger person with shingles. Shingles is caused by the varicella zoster virus, the same virus that causes chickenpox. After you have chickenpox, the virus stays in your body and can cause shingles later in life. Shingles cannot be passed from one person to another, but the virus that causes shingles can spread and cause chickenpox in someone who had never had chickenpox or received chickenpox vaccine. Recombinant shingles vaccine Recombinant shingles vaccine provides strong protection against shingles. By preventing shingles, recombinant shingles vaccine also protects against PHN. Recombinant shingles vaccine is the preferred vaccine for the prevention of shingles. However, a different vaccine, live shingles vaccine, may be used in some circumstances. The recombinant shingles vaccine is recommended for adults 50 years and older without serious immune problems. It is given as a two-dose series. This vaccine is also recommended for people who have already gotten another type of shingles vaccine, the live shingles vaccine. There is no live virus in this vaccine. Shingles vaccine may be given at the same time as other vaccines. Talk with your health care provider Tell your vaccine provider if the person getting the vaccine: 
· Has had an allergic reaction after a previous dose of recombinant shingles vaccine, or has any severe, life-threatening allergies. · Is pregnant or breastfeeding. · Is currently experiencing an episode of shingles. In some cases, your health care provider may decide to postpone shingles vaccination to a future visit. People with minor illnesses, such as a cold, may be vaccinated. People who are moderately or severely ill should usually wait until they recover before getting recombinant shingles vaccine.  
Your health care provider can give you more information. Risks of a vaccine reaction · A sore arm with mild or moderate pain is very common after recombinant shingles vaccine, affecting about 80% of vaccinated people. Redness and swelling can also happen at the site of the injection. · Tiredness, muscle pain, headache, shivering, fever, stomach pain, and nausea happen after vaccination in more than half of people who receive recombinant shingles vaccine. In clinical trials, about 1 out of 6 people who got recombinant zoster vaccine experienced side effects that prevented them from doing regular activities. Symptoms usually went away on their own in 2 to 3 days. You should still get the second dose of recombinant zoster vaccine even if you had one of these reactions after the first dose. People sometimes faint after medical procedures, including vaccination. Tell your provider if you feel dizzy or have vision changes or ringing in the ears. As with any medicine, there is a very remote chance of a vaccine causing a severe allergic reaction, other serious injury, or death. What if there is a serious problem? An allergic reaction could occur after the vaccinated person leaves the clinic. If you see signs of a severe allergic reaction (hives, swelling of the face and throat, difficulty breathing, a fast heartbeat, dizziness, or weakness), call 9-1-1 and get the person to the nearest hospital. 
For other signs that concern you, call your health care provider. Adverse reactions should be reported to the Vaccine Adverse Event Reporting System (VAERS). Your health care provider will usually file this report, or you can do it yourself. Visit the VAERS website at www.vaers. hhs.gov or call 2-571.962.7856. VAERS is only for reporting reactions, and VAERS staff do not give medical advice. How can I learn more? · Ask your health care provider. · Call your local or state health department.  
· Contact the Centers for Disease Control and Prevention (CDC): 
? Call 3-710.916.8491 (1-800-CDC-INFO) or 
? Visit CDC's website at www.cdc.gov/vaccines Vaccine Information Statement Recombinant Zoster Vaccine 10/30/2019 Department of [facility] and [place] Centers for Disease Control and Prevention Many Vaccine Information Statements are available in Sinhala and other languages. See www.immunize.org/vis. Hojas de Información Sobre Vacunas están disponibles en Español y en muchos otros idiomas. Visite Annie[employer] Care instructions adapted under license by [employer] (which disclaims liability or warranty for this information). If you have questions about a medical condition or this instruction, always ask your healthcare professional. [address] any warranty or liability for your use of this information.

## 2020-08-17 NOTE — ACP (ADVANCE CARE PLANNING)
Advance Care Planning       Advance Care Planning (ACP) Physician/NP/PA (Provider) Conversation        Date of ACP Conversation: 8/17/2020    Conversation Conducted with:   Patient with Decision Making Meg Lee Maker:    Current Designated Health Care Decision Maker:   (If there is a valid [place] named in the [address]" box in the ACP activity, but it is not visible above, be sure to open that field and then select the health care decision maker relationship (ie \"primary\") in the blank space to the right of the name.)    Note: Assess and validate information in current ACP documents, as indicated. If no Authorized Decision Maker has previously been identified, then patient chooses [place]:  \"Who would you like to name as your primary health care decision-maker? \"    Name: Yvonne Bazzi   Relationship: Wife Phone number: [phone] Jensen Hernandez, [phone]  \"Can this person be reached easily? \" YES  \"Who would you like to name as your back-up decision maker? \"   Name: Steven Claudio  Relationship: Daughter Phone number: [phone]  Hero Lamas this person be reached easily? \" YES    Note: If the relationship of these Decision-Makers to the patient does NOT follow your state's Next of Kin hierarchy, recommend that patient complete ACP document that meets state-specific requirements to allow them to act on the patient's behalf when appropriate. Care Preferences:    Hospitalization: \"If your health worsens and it becomes clear that your chance of recovery is unlikely, what would your preference be regarding hospitalization? \"  If the patient would want hospitalization, answer \"yes\". If the patient would prefer comfort-focused treatment without hospitalization, answer \"no\". yes      Ventilation:   \"If you were in your present state of health and suddenly became very ill and were unable to breathe on your own, what would your preference be about the use of a ventilator (breathing machine) if it was available to you? \"    If patient would desire the use of a ventilator (breathing machine), answer \"yes\", if not answer \"no\":yes    \"If your health worsens and it becomes clear that your chance of recovery is unlikely, what would your preference be about the use of a ventilator (breathing machine) if it was available to you? \"   no      Resuscitation:  \"CPR works best to restart the heart when there is a sudden event, like a heart attack, in someone who is otherwise healthy. Unfortunately, CPR does not typically restart the heart for people who have serious health conditions or who are very sick. \"    \"In the event your heart stopped as a result of an underlying serious health condition, would you want attempts to be made to restart your heart (answer \"yes\" for attempt to resuscitate) or would you prefer a natural death (answer \"no\" for do not attempt to resuscitate)? \"   yes    NOTE: If the patient has a valid advance directive AND provides care preference(s) that are inconsistent with that prior directive, advise the patient to consider either: creating a new advance directive that complies with state-specific requirements; or, if that is not possible, orally revoking that prior directive in accordance with state-specific requirements, which must be documented in the EHR.     Conversation Outcomes / Follow-Up Plan:   Reviewed Advance Directive on file      Length of Voluntary ACP Conversation in minutes:  <16 minutes (Non-Billable)      Breanna Gomez MD

## 2020-08-17 NOTE — PROGRESS NOTES
This is the Subsequent Medicare Annual Wellness Exam, performed 12 months or more after the Initial AWV or the last Subsequent AWV    I have reviewed the patient's medical history in detail and updated the computerized patient record. History   Freddie Lee is a 76 y.o. male. Presents for Medicare AWV and to establish care. His former PCP was Dr. Ruth Ross, now retired, last saw 2/18/20. He has HTN, hyperlipidemia, prediabetes/IGT, vitamin B12 deficiency, and history of TIA. No complaints today. Cardiovascular Review  The patient has hypertension, hyperlipidemia and history of TIA. He reports taking medications as instructed, no medication side effects noted. Diet and Lifestyle: generally follows a low fat low cholesterol diet, generally follows a low sodium diet, exercises regularly. Lab review: labs reviewed and discussed with patient. Soc Hx  . 3 children and 7 grandchildren. Retired , prior to that worked at [employer]. Former smoker; quit in 2000. Infrequent alcohol. Walks and gardens for exercise.      ROS  A complete review of systems was performed and is negative except for those mentioned in the HPI.     Patient Active Problem List   Diagnosis Code    Hypercholesterolemia E78.00    Hypertension, essential I10    S/P colonoscopy V37.585    ED (erectile dysfunction) N52.9    Actinic keratosis L57.0    COPD, moderate (HCC) J44.9    Bilateral carotid artery stenosis I65.23    History of TIA (transient ischemic attack) Z86.73    Disturbance of memory R41.3    Prediabetes R73.03    B12 deficiency E53.8     Past Medical History:   Diagnosis Date    Arthritis     knee    Cancer [facility] [address]     skin    Chronic obstructive pulmonary disease ([facility] [address]     Hypercholesterolemia     Hypertension     Ill-defined condition     lipids    Smoking 1/22/2010    Stopped 04/2000       Past Surgical History:   Procedure Laterality Date    COLORECTAL SCRN; HI RISK IND  2016         ENDOSCOPY, COLON, DIAGNOSTIC  2007    Dr Jody Guillen polyp repeat 2010    HX ORTHOPAEDIC Right 3/2016    arthroscopic; torn medial meniscus     Current Outpatient Medications   Medication Sig Dispense Refill    tadalafil (CIALIS) 5 mg tablet TAKE 1 TABLET daily 30 Tab 0    lisinopril (PRINIVIL, ZESTRIL) 20 mg tablet TAKE 1 TABLET DAILY 90 Tab 4    cyanocobalamin 1,000 mcg tablet Take 1,000 mcg by mouth daily.  atorvastatin (LIPITOR) 40 mg tablet Take 1 Tab by mouth nightly. 90 Tab 3    clopidogrel (PLAVIX) 75 mg tab Take 1 Tab by mouth daily. 90 Tab 3     Allergies   Allergen Reactions    Macrobid [Nitrofurantoin Monohyd/M-Cryst] Unknown (comments)    Tetracycline Unknown (comments)       Family History   Problem Relation Age of Onset    Hypertension Mother     Thyroid Disease Mother     Stroke Mother     Kidney Disease Father     Cancer Father         skin    Diabetes Father     Neuropathy Brother     Cancer Paternal Aunt         lung    Cancer Paternal Uncle         skin    Diabetes Maternal Grandmother     Hypertension Child      Social History     Tobacco Use    Smoking status: Former Smoker     Packs/day: 2.00     Years: 40.00     Pack years: 80.00     Last attempt to quit: 2000     Years since quittin.3    Smokeless tobacco: Never Used   Substance Use Topics    Alcohol use: Yes     Alcohol/week: 0.0 standard drinks     Types: 2 - 3 Cans of beer per week     Comment: Soc       Depression Risk Factor Screening:     3 most recent PHQ Screens 2020   Little interest or pleasure in doing things Not at all   Feeling down, depressed, irritable, or hopeless Not at all   Total Score PHQ 2 0       Alcohol Risk Factor Screening (MALE > 65):    Do you average more 1 drink per night or more than 7 drinks a week: No    In the past three months have you have had more than 4 drinks containing alcohol on one occasion: No      Functional Ability and Level of Safety:   Hearing: Hearing is good. Activities of Daily Living: The home contains: no safety equipment. Patient does total self care     Ambulation: with no difficulty     Fall Risk:  Fall Risk Assessment, last 12 mths 8/17/2020   Able to walk? Yes   Fall in past 12 months? No   Fall with injury? -   Number of falls in past 12 months -   Fall Risk Score -     Abuse Screen:  Patient is not abused       Cognitive Screening   Has your family/caregiver stated any concerns about your memory: no     Cognitive Screening: normal by exam    Visit Vitals  /82 (BP 1 Location: Left arm, BP Patient Position: Sitting)   Pulse 69   Temp 97.7 °F (36.5 °C) (Oral)   Resp 16   Ht 5' 10\" (1.778 m)   Wt 162 lb 12.8 oz (73.8 kg)   SpO2 96%   BMI 23.36 kg/m²       General: Well-developed and well-nourished, no distress. HEENT:  Head normocephalic/atraumatic, no scleral icterus  Neck: Supple. No carotid bruits, JVD, lymphadenopathy, or thyromegaly. Lungs:  Clear to auscultation bilaterally. Good air movement. Heart:  Regular rate and rhythm, normal S1 and S2, no murmur, gallop, or rub  Extremities: No clubbing, cyanosis, or edema. Normal ROM at knees. Mildly decreased ROM at both shoulders (right > left). 2+ pedal pulses. Neurological: Alert and oriented. Psychiatric: Normal mood and affect.  Behavior is normal.     Results for orders placed or performed in visit on 56/98/28   METABOLIC PANEL, COMPREHENSIVE   Result Value Ref Range    Glucose 96 65 - 99 mg/dL    BUN 14 8 - 27 mg/dL    Creatinine 1.09 0.76 - 1.27 mg/dL    GFR est non-AA 66 >59 mL/min/1.73    GFR est AA 76 >59 mL/min/1.73    BUN/Creatinine ratio 13 10 - 24    Sodium 143 134 - 144 mmol/L    Potassium 4.4 3.5 - 5.2 mmol/L    Chloride 104 96 - 106 mmol/L    CO2 25 20 - 29 mmol/L    Calcium 9.2 8.6 - 10.2 mg/dL    Protein, total 6.5 6.0 - 8.5 g/dL    Albumin 4.1 3.7 - 4.7 g/dL    GLOBULIN, TOTAL 2.4 1.5 - 4.5 g/dL    A-G Ratio 1.7 1.2 - 2.2    Bilirubin, total 0.5 0.0 - 1.2 mg/dL    Alk. phosphatase 136 (H) 39 - 117 IU/L    AST (SGOT) 23 0 - 40 IU/L    ALT (SGPT) 26 0 - 44 IU/L   HEMOGLOBIN A1C WITH EAG   Result Value Ref Range    Hemoglobin A1c 6.1 (H) 4.8 - 5.6 %    Estimated average glucose 128 mg/dL   VITAMIN B12   Result Value Ref Range    Vitamin B12 686 232 - 1,245 pg/mL     Lab Results   Component Value Date/Time    Cholesterol, total 112 02/12/2020 10:27 AM    HDL Cholesterol 44 02/12/2020 10:27 AM    LDL, calculated 53 02/12/2020 10:27 AM    VLDL, calculated 15 02/12/2020 10:27 AM    Triglyceride 76 02/12/2020 10:27 AM    CHOL/HDL Ratio 3.2 01/01/2020 06:15 PM       Patient Care Team   Patient Care Team:  Loyda Aly MD as PCP - General (Internal Medicine)  Hossein Hoffman MD as PCP - [facility]  Yasmeen Sherwood (Inactive) (Dermatology)  Travis Hairston MD (Ophthalmology)    Assessment/Plan   Education and counseling provided:  Are appropriate based on today's review and evaluation  End-of-Life planning (with patient's consent)  Screening for glaucoma    Discussed lab results from 8/5/20 with patient. Normal kidney and liver tests, normal vitamin B12. Prediabetes is a little worse than before (now 6.1%, was 5.9% in Jan 2020). Diagnoses and all orders for this visit:    1. Medicare annual wellness visit, subsequent    2. Hypercholesterolemia  Controlled on atorvastatin 40 mg daily.  -     TSH RFX ON ABNORMAL TO FREE T4; Future  -     LIPID PANEL; Future    3. Hypertension, essential  Controlled on lisinopril 20 mg daily.  -     METABOLIC PANEL, COMPREHENSIVE; Future  -     CBC WITH AUTOMATED DIFF; Future    4. IGT (impaired glucose tolerance)  Counseled on diet and exercise.  -     HEMOGLOBIN A1C WITH EAG; Future    5. History of TIA (transient ischemic attack)  Continue Plavix 7 mg daily. 6. B12 deficiency  Controlled on oral vitamin B12.    7. Screening for depression  -     Amadeo Davalos    8.  Advance directive on file        Health Maintenance Due   Topic Date Due    Influenza Age 5 to Adult  08/01/2020   [facility] GLAUCOMA SCREENING Q2Y  08/20/2020    Medicare Yearly Exam  08/26/2020

## 2020-08-17 NOTE — PROGRESS NOTES
Shruti Rubi is a 76 y.o. male    Chief Complaint   Patient presents with    Cholesterol Problem    Hypertension       Health Maintenance Due   Topic Date Due    Influenza Age 5 to Adult  08/01/2020    GLAUCOMA SCREENING Q2Y  08/20/2020    Medicare Yearly Exam  08/26/2020     Eye exam: Dr. Terrence Mcnamara optometrist    Visit Vitals  /82 (BP 1 Location: Left arm, BP Patient Position: Sitting)   Pulse 69   Temp 97.7 °F (36.5 °C) (Oral)   Resp 16   Ht 5' 10\" (1.778 m)   Wt 162 lb 12.8 oz (73.8 kg)   SpO2 96%   BMI 23.36 kg/m²       Coordination of Care Questionnaire:  :   1) Have you been to an emergency room, urgent care, or hospitalized since your last visit? If yes, where when, and reason for visit? no       2. Have seen or consulted any other health care provider since your last visit? If yes, where when, and reason for visit? NO      Patient is accompanied by wife I have received verbal consent from Shruti Rubi to discuss any/all medical information while they are present in the room.

## 2020-10-12 RX ORDER — TADALAFIL 5 MG/1
TABLET ORAL
Qty: 30 TAB | Refills: 3 | Status: SHIPPED | OUTPATIENT
Start: 2020-10-12 | End: 2021-03-29

## 2020-10-12 NOTE — TELEPHONE ENCOUNTER
PCP: Toya Calero MD     Last appt: 8/17/2020   Future Appointments   Date Time Provider Bertrand Martini   2/22/2021  8:30 AM Toya Calero MD [facility] BS AMB        Requested Prescriptions     Pending Prescriptions Disp Refills    tadalafiL (CIALIS) 5 mg tablet 30 Tab 0     Sig: TAKE 1 TABLET daily

## 2020-10-12 NOTE — TELEPHONE ENCOUNTER
[facility] on file sent a fax requesting a refill of   Requested Prescriptions     Pending Prescriptions Disp Refills    tadalafiL (CIALIS) 5 mg tablet 30 Tab 0     Sig: TAKE 1 TABLET daily Refused

## 2020-11-13 ENCOUNTER — PATIENT MESSAGE (OUTPATIENT)
Dept: INTERNAL MEDICINE CLINIC | Age: 75
End: 2020-11-13

## 2020-11-13 DIAGNOSIS — J44.9 COPD, MODERATE (HCC): ICD-10-CM

## 2020-11-13 RX ORDER — ALBUTEROL SULFATE 90 UG/1
2 AEROSOL, METERED RESPIRATORY (INHALATION)
Qty: 1 INHALER | Refills: 0 | Status: SHIPPED | OUTPATIENT
Start: 2020-11-13 | End: 2021-03-29

## 2020-11-13 NOTE — TELEPHONE ENCOUNTER
REFILL     PCP: Alisa Morelos MD     Last appt: 8/17/2020   Future Appointments   Date Time Provider Bertrand Martini   2/22/2021  8:30 AM Malgorzata Hernandez MD [facility] AMB        Requested Prescriptions     Pending Prescriptions Disp Refills    albuterol (PROVENTIL HFA, VENTOLIN HFA, PROAIR HFA) 90 mcg/actuation inhaler 1 Inhaler 0     Sig: Take 2 Puffs by inhalation every four (4) hours as needed for Wheezing.  Indications: chronic obstructive pulmonary disease

## 2021-01-15 DIAGNOSIS — G45.1 TRANSIENT ISCHEMIC ATTACK INVOLVING RIGHT INTERNAL CAROTID ARTERY: ICD-10-CM

## 2021-01-15 RX ORDER — CLOPIDOGREL BISULFATE 75 MG/1
TABLET ORAL
Qty: 90 TAB | Refills: 3 | Status: SHIPPED | OUTPATIENT
Start: 2021-01-15 | End: 2022-01-10 | Stop reason: SDUPTHER

## 2021-01-31 DIAGNOSIS — E78.00 HYPERCHOLESTEROLEMIA: ICD-10-CM

## 2021-02-01 RX ORDER — ATORVASTATIN CALCIUM 40 MG/1
TABLET, FILM COATED ORAL
Qty: 90 TAB | Refills: 3 | Status: SHIPPED | OUTPATIENT
Start: 2021-02-01 | End: 2022-01-18 | Stop reason: SDUPTHER

## 2021-02-09 LAB
ALBUMIN SERPL-MCNC: 4.1 G/DL (ref 3.7–4.7)
ALBUMIN/GLOB SERPL: 1.4 {RATIO} (ref 1.2–2.2)
ALP SERPL-CCNC: 131 IU/L (ref 39–117)
ALT SERPL-CCNC: 17 IU/L (ref 0–44)
AST SERPL-CCNC: 26 IU/L (ref 0–40)
BASOPHILS # BLD AUTO: 0.1 X10E3/UL (ref 0–0.2)
BASOPHILS NFR BLD AUTO: 1 %
BILIRUB SERPL-MCNC: 0.5 MG/DL (ref 0–1.2)
BUN SERPL-MCNC: 11 MG/DL (ref 8–27)
BUN/CREAT SERPL: 10 (ref 10–24)
CALCIUM SERPL-MCNC: 9.6 MG/DL (ref 8.6–10.2)
CHLORIDE SERPL-SCNC: 104 MMOL/L (ref 96–106)
CHOLEST SERPL-MCNC: 115 MG/DL (ref 100–199)
CO2 SERPL-SCNC: 24 MMOL/L (ref 20–29)
CREAT SERPL-MCNC: 1.1 MG/DL (ref 0.76–1.27)
EOSINOPHIL # BLD AUTO: 0.3 X10E3/UL (ref 0–0.4)
EOSINOPHIL NFR BLD AUTO: 5 %
ERYTHROCYTE [DISTWIDTH] IN BLOOD BY AUTOMATED COUNT: 12.7 % (ref 11.6–15.4)
EST. AVERAGE GLUCOSE BLD GHB EST-MCNC: 126 MG/DL
GLOBULIN SER CALC-MCNC: 2.9 G/DL (ref 1.5–4.5)
GLUCOSE SERPL-MCNC: 91 MG/DL (ref 65–99)
HBA1C MFR BLD: 6 % (ref 4.8–5.6)
HCT VFR BLD AUTO: 46 % (ref 37.5–51)
HDLC SERPL-MCNC: 49 MG/DL
HGB BLD-MCNC: 15.3 G/DL (ref 13–17.7)
IMM GRANULOCYTES # BLD AUTO: 0 X10E3/UL (ref 0–0.1)
IMM GRANULOCYTES NFR BLD AUTO: 0 %
LDLC SERPL CALC-MCNC: 47 MG/DL (ref 0–99)
LYMPHOCYTES # BLD AUTO: 1.6 X10E3/UL (ref 0.7–3.1)
LYMPHOCYTES NFR BLD AUTO: 25 %
MCH RBC QN AUTO: 30.1 PG (ref 26.6–33)
MCHC RBC AUTO-ENTMCNC: 33.3 G/DL (ref 31.5–35.7)
MCV RBC AUTO: 90 FL (ref 79–97)
MONOCYTES # BLD AUTO: 0.6 X10E3/UL (ref 0.1–0.9)
MONOCYTES NFR BLD AUTO: 10 %
NEUTROPHILS # BLD AUTO: 3.7 X10E3/UL (ref 1.4–7)
NEUTROPHILS NFR BLD AUTO: 59 %
PLATELET # BLD AUTO: 251 X10E3/UL (ref 150–450)
POTASSIUM SERPL-SCNC: 4.6 MMOL/L (ref 3.5–5.2)
PROT SERPL-MCNC: 7 G/DL (ref 6–8.5)
RBC # BLD AUTO: 5.09 X10E6/UL (ref 4.14–5.8)
SODIUM SERPL-SCNC: 141 MMOL/L (ref 134–144)
TRIGL SERPL-MCNC: 99 MG/DL (ref 0–149)
TSH SERPL DL<=0.005 MIU/L-ACNC: 1.69 UIU/ML (ref 0.45–4.5)
VLDLC SERPL CALC-MCNC: 19 MG/DL (ref 5–40)
WBC # BLD AUTO: 6.2 X10E3/UL (ref 3.4–10.8)

## 2021-02-11 NOTE — PROGRESS NOTES
Will discuss lab results at 2/22/21 appointment. Prediabetes a little better than it was 6 months ago (now 6.0%, was 6.1%). Other labs normal, including kidney and liver tests, blood counts, cholesterol (tot chol 115, LDL 47), and thyroid.

## 2021-02-26 ENCOUNTER — VIRTUAL VISIT (OUTPATIENT)
Dept: INTERNAL MEDICINE CLINIC | Age: 76
End: 2021-02-26
Payer: MEDICARE

## 2021-02-26 DIAGNOSIS — J40 BRONCHITIS: Primary | ICD-10-CM

## 2021-02-26 PROCEDURE — 99441 PR PHYS/QHP TELEPHONE EVALUATION 5-10 MIN: CPT | Performed by: NURSE PRACTITIONER

## 2021-02-26 RX ORDER — AZITHROMYCIN 250 MG/1
250 TABLET, FILM COATED ORAL SEE ADMIN INSTRUCTIONS
Qty: 6 TAB | Refills: 0 | Status: SHIPPED | OUTPATIENT
Start: 2021-02-26 | End: 2021-03-03

## 2021-02-26 NOTE — PROGRESS NOTES
Cheikh Keith  Identified pt with two pt identifiers(name and ). Chief Complaint   Patient presents with    Sinus Infection     Chest congestion. Pt states that he has had similar sx last year this time and would like for PCP to check if he has been seen for something similar to this.  Shortness of Breath     Wheezing when active and cold air        Reviewed record In preparation for visit and have obtained necessary documentation. 1. Have you been to the ER, urgent care clinic or hospitalized since your last visit? No     2. Have you seen or consulted any other health care providers outside of the [address] [place] [address] since your last visit? Include any pap smears or colon screening. No    Patient has an advance directive. Vitals reviewed with provider. Health Maintenance reviewed:     Health Maintenance Due   Topic    COVID-19 Vaccine (1 of 2)    Shingrix Vaccine Age 49> (1 of 2)    GLAUCOMA SCREENING Q2Y     Colorectal Cancer Screening Combo       N Wt Readings from Last 3 Encounters:   20 162 lb 12.8 oz (73.8 kg)   20 162 lb (73.5 kg)   20 171 lb (77.6 kg)   N N        Y Temp Readings from Last 3 Encounters:   20 97.7 °F (36.5 °C) (Oral)   20 97.7 °F (36.5 °C) (Oral)   20 97.6 °F (36.4 °C) (Oral)   Y Y    2020                                            N N  BP Readings from Last 3 Encounters:   20 129/82   20 110/70   20 129/83    N   Are you   3 most recent PHQ Screens 2021   Little interest or pleasure in doing things Not at all   Feeling down, depressed, irritable, or hopeless Not at all   Total Score PHQ 2    Able to walk? Yes   Fall in past 12 months? 0   Number of falls in past 12 months -   Fall with injury?  -

## 2021-02-26 NOTE — PROGRESS NOTES
Camila Gray is a 76 y.o. male, evaluated via audio-only technology on 2/26/2021 for Sinus Infection (Chest congestion. Pt states that he has had similar sx last year this time and would like for PCP to check if he has been seen for something similar to this. ) and Shortness of Breath (Wheezing when active and cold air )  . Assessment & Plan:   Diagnoses and all orders for this visit:    1. Bronchitis  -     azithromycin (ZITHROMAX) 250 mg tablet; Take 1 Tab by mouth See Admin Instructions for 5 days. Advised it wheezing persists would add on prednisone- he will call if not improved in 2-3 days  Recommend use albuterol for wheezing/sob          12  Subjective:     Pt c/o chest congestion and wheezing that started 3-4 days ago. States he was seen for similar illness last year (don't have records). He has hx of COPD managed with albuterol. Feels a little short of breath. Coughing up some mucus. Hasn't used albuterol at all- makes his heart beat fast.  Denies fevers or chills. Denies known exposure of COVID. Denies loss of taste or smell. Prior to Admission medications    Medication Sig Start Date End Date Taking? Authorizing Provider   atorvastatin (LIPITOR) 40 mg tablet TAKE 1 TABLET NIGHTLY 2/1/21  Yes Yareli Coronel MD   clopidogreL (PLAVIX) 75 mg tab TAKE 1 TABLET DAILY 1/15/21  Yes Yareli Coronel MD   tadalafiL (CIALIS) 5 mg tablet TAKE 1 TABLET daily 10/12/20  Yes Yareli Coronel MD   lisinopril (PRINIVIL, ZESTRIL) 20 mg tablet TAKE 1 TABLET DAILY 2/19/20  Yes Zoey Kline MD   cyanocobalamin 1,000 mcg tablet Take 1,000 mcg by mouth daily. Yes Provider, Historical   albuterol (PROVENTIL HFA, VENTOLIN HFA, PROAIR HFA) 90 mcg/actuation inhaler Take 2 Puffs by inhalation every four (4) hours as needed for Wheezing. Indications: chronic obstructive pulmonary disease 11/13/20   Yareli Coronel MD         ROS see hpi  This visit was completed virtually using doxy. me       No flowsheet data found. Brown Conrad, who was evaluated through a patient-initiated, synchronous (real-time) audio only encounter, and/or her healthcare decision maker, is aware that it is a billable service, with coverage as determined by his insurance carrier. He provided verbal consent to proceed: Yes. He has not had a related appointment within my department in the past 7 days or scheduled within the next 24 hours.       Total Time: minutes: 5-10 minutes    Liza Goddard NP

## 2021-02-26 NOTE — PATIENT INSTRUCTIONS
Bronchitis: Care Instructions Your Care Instructions Bronchitis is inflammation of the bronchial tubes, which carry air to the lungs. The tubes swell and produce mucus, or phlegm. The mucus and inflamed bronchial tubes make you cough. You may have trouble breathing. Most cases of bronchitis are caused by viruses like those that cause colds. Antibiotics usually do not help and they may be harmful. Bronchitis usually develops rapidly and lasts about 2 to 3 weeks in otherwise healthy people. Follow-up care is a key part of your treatment and safety. Be sure to make and go to all appointments, and call your doctor if you are having problems. It's also a good idea to know your test results and keep a list of the medicines you take. How can you care for yourself at home? · Take all medicines exactly as prescribed. Call your doctor if you think you are having a problem with your medicine. · Get some extra rest. 
· Take an over-the-counter pain medicine, such as acetaminophen (Tylenol), ibuprofen (Advil, Motrin), or naproxen (Aleve) to reduce fever and relieve body aches. Read and follow all instructions on the label. · Do not take two or more pain medicines at the same time unless the doctor told you to. Many pain medicines have acetaminophen, which is Tylenol. Too much acetaminophen (Tylenol) can be harmful. · Take an over-the-counter cough medicine that contains dextromethorphan to help quiet a dry, hacking cough so that you can sleep. Avoid cough medicines that have more than one active ingredient. Read and follow all instructions on the label. · Breathe moist air from a humidifier, hot shower, or sink filled with hot water. The heat and moisture will thin mucus so you can cough it out. · Do not smoke. Smoking can make bronchitis worse. If you need help quitting, talk to your doctor about stop-smoking programs and medicines. These can increase your chances of quitting for good. When should you call for help? Call 911 anytime you think you may need emergency care. For example, call if: 
  · You have severe trouble breathing. Call your doctor now or seek immediate medical care if: 
  · You have new or worse trouble breathing.  
  · You cough up dark brown or bloody mucus (sputum).  
  · You have a new or higher fever.  
  · You have a new rash. Watch closely for changes in your health, and be sure to contact your doctor if: 
  · You cough more deeply or more often, especially if you notice more mucus or a change in the color of your mucus.  
  · You are not getting better as expected. Where can you learn more? Go to http://www.gray.com/ Enter H333 in the search box to learn more about \"Bronchitis: Care Instructions. \" Current as of: February 24, 2020               Content Version: 12.6 © 2754-6763 [employer]. Care instructions adapted under license by [employer] (which disclaims liability or warranty for this information). If you have questions about a medical condition or this instruction, always ask your healthcare professional. [address] any warranty or liability for your use of this information.

## 2021-03-29 ENCOUNTER — OFFICE VISIT (OUTPATIENT)
Dept: INTERNAL MEDICINE CLINIC | Age: 76
End: 2021-03-29
Payer: MEDICARE

## 2021-03-29 VITALS
RESPIRATION RATE: 16 BRPM | DIASTOLIC BLOOD PRESSURE: 68 MMHG | HEIGHT: 70 IN | SYSTOLIC BLOOD PRESSURE: 100 MMHG | OXYGEN SATURATION: 94 % | BODY MASS INDEX: 23.99 KG/M2 | WEIGHT: 167.6 LBS | HEART RATE: 70 BPM | TEMPERATURE: 97.8 F

## 2021-03-29 DIAGNOSIS — Z86.73 HISTORY OF TIA (TRANSIENT ISCHEMIC ATTACK): ICD-10-CM

## 2021-03-29 DIAGNOSIS — R73.02 IGT (IMPAIRED GLUCOSE TOLERANCE): ICD-10-CM

## 2021-03-29 DIAGNOSIS — J44.9 COPD, MODERATE (HCC): ICD-10-CM

## 2021-03-29 DIAGNOSIS — Z12.11 SCREENING FOR COLON CANCER: ICD-10-CM

## 2021-03-29 DIAGNOSIS — I10 HYPERTENSION, ESSENTIAL: Primary | ICD-10-CM

## 2021-03-29 DIAGNOSIS — E78.00 HYPERCHOLESTEROLEMIA: ICD-10-CM

## 2021-03-29 DIAGNOSIS — N52.9 ERECTILE DYSFUNCTION, UNSPECIFIED ERECTILE DYSFUNCTION TYPE: ICD-10-CM

## 2021-03-29 PROCEDURE — 3017F COLORECTAL CA SCREEN DOC REV: CPT | Performed by: INTERNAL MEDICINE

## 2021-03-29 PROCEDURE — G8427 DOCREV CUR MEDS BY ELIG CLIN: HCPCS | Performed by: INTERNAL MEDICINE

## 2021-03-29 PROCEDURE — G8420 CALC BMI NORM PARAMETERS: HCPCS | Performed by: INTERNAL MEDICINE

## 2021-03-29 PROCEDURE — G8752 SYS BP LESS 140: HCPCS | Performed by: INTERNAL MEDICINE

## 2021-03-29 PROCEDURE — 99214 OFFICE O/P EST MOD 30 MIN: CPT | Performed by: INTERNAL MEDICINE

## 2021-03-29 PROCEDURE — G8536 NO DOC ELDER MAL SCRN: HCPCS | Performed by: INTERNAL MEDICINE

## 2021-03-29 PROCEDURE — 1101F PT FALLS ASSESS-DOCD LE1/YR: CPT | Performed by: INTERNAL MEDICINE

## 2021-03-29 PROCEDURE — G8754 DIAS BP LESS 90: HCPCS | Performed by: INTERNAL MEDICINE

## 2021-03-29 PROCEDURE — G8432 DEP SCR NOT DOC, RNG: HCPCS | Performed by: INTERNAL MEDICINE

## 2021-03-29 RX ORDER — TADALAFIL 10 MG/1
10 TABLET ORAL DAILY
Qty: 30 TAB | Refills: 3 | Status: SHIPPED | OUTPATIENT
Start: 2021-03-29 | End: 2021-10-13

## 2021-03-29 RX ORDER — LORATADINE 10 MG/1
10 TABLET ORAL
COMMUNITY
End: 2022-01-27

## 2021-03-29 RX ORDER — ALBUTEROL SULFATE 90 UG/1
2 AEROSOL, METERED RESPIRATORY (INHALATION)
Qty: 1 INHALER | Refills: 0 | Status: SHIPPED | OUTPATIENT
Start: 2021-03-29 | End: 2022-01-04 | Stop reason: SDUPTHER

## 2021-03-29 NOTE — PROGRESS NOTES
CC:   Chief Complaint   Patient presents with    Hypertension     Room 3A //    Cholesterol Problem       HISTORY OF PRESENT ILLNESS  Andrea Haywood is a 76 y.o. male. Presents for 6 month follow up evaluation. He has HTN, hyperlipidemia, prediabetes/IGT, vitamin B12 deficiency, and history of TIA. No complaints today. Reports he was seen at [facility] earlier this month for COPD exacerbation. CXR was normal.   Told that he had allergie affecting his breathing. Was prescribed loratadine; symptoms improved. Complains that Cialis 5 mg is no longer helping with ED. Requests a stronger dose. Denies HA's, CP, SOB, wheezing, or leg swelling. Patient Active Problem List   Diagnosis Code    Hypercholesterolemia E78.00    Hypertension, essential I10    S/P colonoscopy P00.252    ED (erectile dysfunction) N52.9    Actinic keratosis L57.0    Bilateral carotid artery stenosis I65.23    History of TIA (transient ischemic attack) Z86.73    Disturbance of memory R41.3    IGT (impaired glucose tolerance) R73.02    B12 deficiency E53.8     Past Medical History:   Diagnosis Date    Arthritis     knee    Cancer ([facility] [address]     skin    Chronic obstructive pulmonary disease (HCC)     Hypercholesterolemia     Hypertension     Smoking 1/22/2010    Stopped 04/2000      Allergies   Allergen Reactions    Macrobid [Nitrofurantoin Monohyd/M-Cryst] Unknown (comments)    Tetracycline Unknown (comments)       Current Outpatient Medications   Medication Sig Dispense Refill    loratadine (Claritin) 10 mg tablet Take 10 mg by mouth.  atorvastatin (LIPITOR) 40 mg tablet TAKE 1 TABLET NIGHTLY 90 Tab 3    clopidogreL (PLAVIX) 75 mg tab TAKE 1 TABLET DAILY 90 Tab 3    lisinopril (PRINIVIL, ZESTRIL) 20 mg tablet TAKE 1 TABLET DAILY 90 Tab 4    cyanocobalamin 1,000 mcg tablet Take 1,000 mcg by mouth daily.            PHYSICAL EXAM  Visit Vitals  /68 (BP 1 Location: Left upper arm, BP Patient Position: Sitting, BP Cuff Size: Adult)   Pulse 70   Temp 97.8 °F (36.6 °C) (Oral)   Resp 16   Ht 5' 10\" (1.778 m)   Wt 167 lb 9.6 oz (76 kg)   SpO2 94%   BMI 24.05 kg/m²       General: Well-developed and well-nourished, no distress. HEENT:  Head normocephalic/atraumatic, no scleral icterus  Lungs:  Clear to ausculation bilaterally. Good air movement. Heart:  Regular rate and rhythm, normal S1 and S2, no murmur, gallop, or rub  Extremities: No clubbing, cyanosis, or edema. Neurological: Alert and oriented. Psychiatric: Normal mood and affect. Behavior is normal.     Results for orders placed or performed in visit on 02/08/21   CBC WITH AUTOMATED DIFF   Result Value Ref Range    WBC 6.2 3.4 - 10.8 x10E3/uL    RBC 5.09 4.14 - 5.80 x10E6/uL    HGB 15.3 13.0 - 17.7 g/dL    HCT 46.0 37.5 - 51.0 %    MCV 90 79 - 97 fL    MCH 30.1 26.6 - 33.0 pg    MCHC 33.3 31.5 - 35.7 g/dL    RDW 12.7 11.6 - 15.4 %    PLATELET 153 526 - 377 x10E3/uL    NEUTROPHILS 59 Not Estab. %    Lymphocytes 25 Not Estab. %    MONOCYTES 10 Not Estab. %    EOSINOPHILS 5 Not Estab. %    BASOPHILS 1 Not Estab. %    ABS. NEUTROPHILS 3.7 1.4 - 7.0 x10E3/uL    Abs Lymphocytes 1.6 0.7 - 3.1 x10E3/uL    ABS. MONOCYTES 0.6 0.1 - 0.9 x10E3/uL    ABS. EOSINOPHILS 0.3 0.0 - 0.4 x10E3/uL    ABS. BASOPHILS 0.1 0.0 - 0.2 x10E3/uL    IMMATURE GRANULOCYTES 0 Not Estab. %    ABS. IMM.  GRANS. 0.0 0.0 - 0.1 X30N2/FB   METABOLIC PANEL, COMPREHENSIVE   Result Value Ref Range    Glucose 91 65 - 99 mg/dL    BUN 11 8 - 27 mg/dL    Creatinine 1.10 0.76 - 1.27 mg/dL    GFR est non-AA 65 >59 mL/min/1.73    GFR est AA 76 >59 mL/min/1.73    BUN/Creatinine ratio 10 10 - 24    Sodium 141 134 - 144 mmol/L    Potassium 4.6 3.5 - 5.2 mmol/L    Chloride 104 96 - 106 mmol/L    CO2 24 20 - 29 mmol/L    Calcium 9.6 8.6 - 10.2 mg/dL    Protein, total 7.0 6.0 - 8.5 g/dL    Albumin 4.1 3.7 - 4.7 g/dL    GLOBULIN, TOTAL 2.9 1.5 - 4.5 g/dL    A-G Ratio 1.4 1.2 - 2.2    Bilirubin, total 0.5 0.0 - 1.2 mg/dL    Alk. phosphatase 131 (H) 39 - 117 IU/L    AST (SGOT) 26 0 - 40 IU/L    ALT (SGPT) 17 0 - 44 IU/L   LIPID PANEL   Result Value Ref Range    Cholesterol, total 115 100 - 199 mg/dL    Triglyceride 99 0 - 149 mg/dL    HDL Cholesterol 49 >39 mg/dL    VLDL, calculated 19 5 - 40 mg/dL    LDL, calculated 47 0 - 99 mg/dL   HEMOGLOBIN A1C WITH EAG   Result Value Ref Range    Hemoglobin A1c 6.0 (H) 4.8 - 5.6 %    Estimated average glucose 126 mg/dL   TSH RFX ON ABNORMAL TO FREE T4   Result Value Ref Range    TSH 1.690 0.450 - 4.500 uIU/mL         ASSESSMENT AND PLAN    ICD-10-CM ICD-9-CM    1. Hypertension, essential  I10 401.9    2. Hypercholesterolemia  E78.00 272.0    3. IGT (impaired glucose tolerance)  R73.02 790.22    4. COPD, moderate ([address]  J44.9 496 albuterol (PROVENTIL HFA, VENTOLIN HFA, PROAIR HFA) 90 mcg/actuation inhaler   5. History of TIA (transient ischemic attack)  Z86.73 V12.54    6. Erectile dysfunction, unspecified erectile dysfunction type  N52.9 607.84 tadalafiL (Cialis) 10 mg tablet   7. Screening for colon cancer  Z12.11 V76.51 REFERRAL TO GASTROENTEROLOGY     Discussed lab results from 2/8/21. Prediabetes a little better than it was 6 months ago (now 6.0%, was 6.1%).  Other labs normal, including kidney and liver tests, blood counts, cholesterol (tot chol 115, LDL 47), and thyroid. Diagnoses and all orders for this visit:    1. Hypertension, essential  Controlled. Continue lisinopril 20 mg daily. 2. Hypercholesterolemia  Controlled on atorvastatin 40 mg daily. 3. IGT (impaired glucose tolerance)  Counseled on diet and exercise. 4. COPD, moderate ([address]  Controlled. Continue loratadine for seasonal allergic rhinitis that triggered COPD exacerbation earlier this month. -     Refill albuterol (PROVENTIL HFA, VENTOLIN HFA, PROAIR HFA) 90 mcg/actuation inhaler; Take 2 Puffs by inhalation every four (4) hours as needed for Wheezing.  Indications: chronic obstructive pulmonary disease    5. History of TIA (transient ischemic attack)  Continue Plavix and atorvastatin. 6. Erectile dysfunction, unspecified erectile dysfunction type  Increase Cialis dose from 5 to 10 mg daily. -     tadalafiL (Cialis) 10 mg tablet; Take 1 Tab by mouth daily. 7. Screening for colon cancer  -     REFERRAL TO GASTROENTEROLOGY (Dr. Sharmin Rollins)      Follow-up and Dispositions    · Return in about 5 months (around 8/29/2021), or if symptoms worsen or fail to improve, for Medicare AWV. I have discussed the diagnosis with the patient and the intended plan as seen in the above orders. Patient is in agreement. The patient has received an after-visit summary and questions were answered concerning future plans. I have discussed medication side effects and warnings with the patient as well.

## 2021-03-29 NOTE — PATIENT INSTRUCTIONS
Prediabetes: Care Instructions  Overview     Prediabetes is a warning sign that you're at risk for getting type 2 diabetes. It means that your blood sugar is higher than it should be. But it's not high enough to be diabetes. The food you eat naturally turns into sugar. Your body uses the sugar for energy. Normally, an organ called the pancreas makes insulin. And insulin allows the sugar in your blood to get into your body's cells. But sometimes the body can't use insulin the right way. So the sugar stays in your blood instead. This is called insulin resistance. The buildup of sugar in your blood means you have prediabetes. The good news is that you may be able to prevent or delay diabetes. Making small lifestyle changes, like getting active and changing your eating habits, may help you get your blood sugar back to normal. You can work with your doctor to make a treatment plan. Follow-up care is a key part of your treatment and safety. Be sure to make and go to all appointments, and call your doctor if you are having problems. It's also a good idea to know your test results and keep a list of the medicines you take. How can you care for yourself at home? · Watch your weight. A healthy weight helps your body use insulin properly. · Limit the amount of calories, sweets, and unhealthy fat you eat. Ask your doctor if you should see a dietitian. A registered dietitian can help you create meal plans that fit your lifestyle. · Get at least 30 minutes of exercise on most days of the week. Exercise helps control your blood sugar. It also helps you maintain a healthy weight. Walking is a good choice. You also may want to do other activities, such as running, swimming, cycling, or playing tennis or team sports. · Do not smoke. Smoking can make prediabetes worse. If you need help quitting, talk to your doctor about stop-smoking programs and medicines. These can increase your chances of quitting for good.   · If your doctor prescribed medicines, take them exactly as prescribed. Call your doctor if you think you are having a problem with your medicine. You will get more details on the specific medicines your doctor prescribes. When should you call for help? Watch closely for changes in your health, and be sure to contact your doctor if:    · You have any symptoms of diabetes. These may include:  ? Being thirsty more often. ? Urinating more. ? Being hungrier. ? Losing weight. ? Being very tired. ? Having blurry vision.     · You have a wound that will not heal.     · You have an infection that will not go away.     · You have problems with your blood pressure.     · You want more information about diabetes and how you can keep from getting it. Where can you learn more? Go to http://www.gray.com/  Enter I222 in the search box to learn more about \"Prediabetes: Care Instructions. \"  Current as of: December 20, 2019               Content Version: 12.6  © 3349-0342 [employer]. Care instructions adapted under license by [employer] (which disclaims liability or warranty for this information). If you have questions about a medical condition or this instruction, always ask your healthcare professional. [address] any warranty or liability for your use of this information.

## 2021-03-29 NOTE — PROGRESS NOTES
Allison Kwon  Identified pt with two pt identifiers(name and ). Chief Complaint   Patient presents with    Hypertension     Room 3A //    Cholesterol Problem       Reviewed record In preparation for visit and have obtained necessary documentation. 1. Have you been to the ER, urgent care clinic or hospitalized since your last visit? Yes. [facility] [address] [facility]     2. Have you seen or consulted any other health care providers outside of the [address] [place] [address] since your last visit? Include any pap smears or colon screening. No    Patient has an advance directive. Vitals reviewed with provider.     Health Maintenance reviewed:     Health Maintenance Due   Topic    Shingrix Vaccine Age 49> (1 of 2)    Colorectal Cancer Screening Combo           Wt Readings from Last 3 Encounters:   21 167 lb 9.6 oz (76 kg)   20 162 lb 12.8 oz (73.8 kg)   20 162 lb (73.5 kg)        Temp Readings from Last 3 Encounters:   21 97.8 °F (36.6 °C) (Oral)   20 97.7 °F (36.5 °C) (Oral)   20 97.7 °F (36.5 °C) (Oral)        BP Readings from Last 3 Encounters:   21 100/68   20 129/82   20 110/70        Pulse Readings from Last 3 Encounters:   21 70   20 69   20 63        Vitals:    21 1111   BP: 100/68   Pulse: 70   Resp: 16   Temp: 97.8 °F (36.6 °C)   TempSrc: Oral   SpO2: 94%   Weight: 167 lb 9.6 oz (76 kg)   Height: 5' 10\" (1.778 m)   PainSc:   0 - No pain          Learning Assessment:   :       Learning Assessment 2017   PRIMARY LEARNER Patient Patient Patient   HIGHEST LEVEL OF EDUCATION - PRIMARY LEARNER  - - DID NOT GRADUATE HIGH SCHOOL   BARRIERS PRIMARY LEARNER - - NONE   CO-LEARNER CAREGIVER - - No   PRIMARY LANGUAGE ENGLISH ENGLISH ENGLISH   LEARNER PREFERENCE PRIMARY DEMONSTRATION DEMONSTRATION READING   ANSWERED BY patient patient self   RELATIONSHIP SELF SELF SELF        Depression Screening:   : 3 most recent PHQ Screens 2/26/2021   Little interest or pleasure in doing things Not at all   Feeling down, depressed, irritable, or hopeless Not at all   Total Score PHQ 2 0        Fall Risk Assessment:   :       Fall Risk Assessment, last 12 mths 2/26/2021   Able to walk? Yes   Fall in past 12 months? 0   Number of falls in past 12 months -   Fall with injury? -        Abuse Screening:   :       Abuse Screening Questionnaire 8/17/2020 8/26/2019 10/15/2018 10/24/2017 10/11/2016 9/10/2015 9/17/2014   Do you ever feel afraid of your partner? N N N N N N N   Are you in a relationship with someone who physically or mentally threatens you? N N N N N N N   Is it safe for you to go home?  Y Y Y Y Y Y Y        ADL Screening:   :       ADL Assessment 8/17/2020   Feeding yourself No Help Needed   Getting from bed to chair No Help Needed   Getting dressed No Help Needed   Bathing or showering No Help Needed   Walk across the room (includes cane/walker) No Help Needed   Using the telphone No Help Needed   Taking your medications No Help Needed   Preparing meals No Help Needed   Managing money (expenses/bills) No Help Needed   Moderately strenuous housework (laundry) No Help Needed   Shopping for personal items (toiletries/medicines) No Help Needed   Shopping for groceries No Help Needed   Driving No Help Needed   Climbing a flight of stairs No Help Needed   Getting to places beyond walking distances No Help Needed

## 2021-05-11 RX ORDER — LISINOPRIL 20 MG/1
TABLET ORAL
Qty: 90 TAB | Refills: 3 | Status: SHIPPED | OUTPATIENT
Start: 2021-05-11 | End: 2022-04-20 | Stop reason: SDUPTHER

## 2021-06-29 NOTE — TELEPHONE ENCOUNTER
Informed patient.
Phone Number: [phone] (Call me)                     Pharmacy called to speak with nurse in regards to pt's tadalafil (CIALIS) 10 mg tablet.  Due to insurance it has to have a frequency of taking the medication. . It can say Daily, at night in the am just can not say TAKE 1 TABLET AS NEEDED insurance will not approve it.
No

## 2021-08-23 ENCOUNTER — OFFICE VISIT (OUTPATIENT)
Dept: INTERNAL MEDICINE CLINIC | Age: 76
End: 2021-08-23
Payer: MEDICARE

## 2021-08-23 VITALS
HEIGHT: 70 IN | WEIGHT: 161 LBS | HEART RATE: 65 BPM | SYSTOLIC BLOOD PRESSURE: 124 MMHG | DIASTOLIC BLOOD PRESSURE: 79 MMHG | OXYGEN SATURATION: 98 % | TEMPERATURE: 98 F | BODY MASS INDEX: 23.05 KG/M2 | RESPIRATION RATE: 16 BRPM

## 2021-08-23 DIAGNOSIS — I10 HYPERTENSION, ESSENTIAL: ICD-10-CM

## 2021-08-23 DIAGNOSIS — E78.00 HYPERCHOLESTEROLEMIA: ICD-10-CM

## 2021-08-23 DIAGNOSIS — Z13.31 SCREENING FOR DEPRESSION: ICD-10-CM

## 2021-08-23 DIAGNOSIS — J44.9 COPD, MODERATE (HCC): ICD-10-CM

## 2021-08-23 DIAGNOSIS — R73.02 IGT (IMPAIRED GLUCOSE TOLERANCE): ICD-10-CM

## 2021-08-23 DIAGNOSIS — Z00.00 MEDICARE ANNUAL WELLNESS VISIT, SUBSEQUENT: Primary | ICD-10-CM

## 2021-08-23 DIAGNOSIS — N52.9 ERECTILE DYSFUNCTION, UNSPECIFIED ERECTILE DYSFUNCTION TYPE: ICD-10-CM

## 2021-08-23 DIAGNOSIS — Z78.9 ADVANCE DIRECTIVE ON FILE: ICD-10-CM

## 2021-08-23 PROCEDURE — G8752 SYS BP LESS 140: HCPCS | Performed by: INTERNAL MEDICINE

## 2021-08-23 PROCEDURE — G0439 PPPS, SUBSEQ VISIT: HCPCS | Performed by: INTERNAL MEDICINE

## 2021-08-23 PROCEDURE — G8427 DOCREV CUR MEDS BY ELIG CLIN: HCPCS | Performed by: INTERNAL MEDICINE

## 2021-08-23 PROCEDURE — 1101F PT FALLS ASSESS-DOCD LE1/YR: CPT | Performed by: INTERNAL MEDICINE

## 2021-08-23 PROCEDURE — G8420 CALC BMI NORM PARAMETERS: HCPCS | Performed by: INTERNAL MEDICINE

## 2021-08-23 PROCEDURE — G8536 NO DOC ELDER MAL SCRN: HCPCS | Performed by: INTERNAL MEDICINE

## 2021-08-23 PROCEDURE — 99213 OFFICE O/P EST LOW 20 MIN: CPT | Performed by: INTERNAL MEDICINE

## 2021-08-23 PROCEDURE — G8510 SCR DEP NEG, NO PLAN REQD: HCPCS | Performed by: INTERNAL MEDICINE

## 2021-08-23 PROCEDURE — G8754 DIAS BP LESS 90: HCPCS | Performed by: INTERNAL MEDICINE

## 2021-08-23 NOTE — PATIENT INSTRUCTIONS
Medicare Wellness Visit, Male    The best way to live healthy is to have a lifestyle where you eat a well-balanced diet, exercise regularly, limit alcohol use, and quit all forms of tobacco/nicotine, if applicable. Regular preventive services are another way to keep healthy. Preventive services (vaccines, screening tests, monitoring & exams) can help personalize your care plan, which helps you manage your own care. Screening tests can find health problems at the earliest stages, when they are easiest to treat. [facility] follows the current, evidence-based guidelines published by the [facility] ([facility]) when recommending preventive services for our patients. Because we follow these guidelines, sometimes recommendations change over time as research supports it. (For example, a prostate screening blood test is no longer routinely recommended for men with no symptoms). Of course, you and your doctor may decide to screen more often for some diseases, based on your risk and co-morbidities (chronic disease you are already diagnosed with). Preventive services for you include:  - Medicare offers their members a free annual wellness visit, which is time for you and your primary care provider to discuss and plan for your preventive service needs. Take advantage of this benefit every year!  -All adults over age 72 should receive the recommended pneumonia vaccines. Current USPSTF guidelines recommend a series of two vaccines for the best pneumonia protection.   -All adults should have a flu vaccine yearly and tetanus vaccine every 10 years.  -All adults age 48 and older should receive the shingles vaccines (series of two vaccines).        -All adults age 38-68 who are overweight should have a diabetes screening test once every three years.   -Other screening tests & preventive services for persons with diabetes include: an eye exam to screen for diabetic retinopathy, a kidney function test, a foot exam, and stricter control over your cholesterol.   -Cardiovascular screening for adults with routine risk involves an electrocardiogram (ECG) at intervals determined by the provider.   -Colorectal cancer screening should be done for adults age 54-65 with no increased risk factors for colorectal cancer. There are a number of acceptable methods of screening for this type of cancer. Each test has its own benefits and drawbacks. Discuss with your provider what is most appropriate for you during your annual wellness visit. The different tests include: colonoscopy (considered the best screening method), a fecal occult blood test, a fecal DNA test, and sigmoidoscopy.  -All adults born between [facility] should be screened once for Hepatitis C.  -An Abdominal Aortic Aneurysm (AAA) Screening is recommended for men age 73-68 who has ever smoked in their lifetime. Here is a list of your current Health Maintenance items (your personalized list of preventive services) with a due date: There are no preventive care reminders to display for this patient.

## 2021-08-23 NOTE — ACP (ADVANCE CARE PLANNING)
Advance Care Planning     Advance Care Planning (ACP) Physician/NP/PA Conversation      Date of Conversation: 8/23/2021  Conducted with: Patient with Decision Making Capacity    Healthcare Decision Maker:     Primary Decision Maker: Enrique Case - Spouse - [phone]  Click here to complete [place] including selection of the Healthcare Decision Maker Relationship (ie \"Primary\")      Care Preferences:    Hospitalization: \"If your health worsens and it becomes clear that your chance of recovery is unlikely, what would be your preference regarding hospitalization? \"  The patient would prefer hospitalization. Ventilation: \"If you were unable to breathe on your own and your chance of recovery was unlikely, what would be your preference about the use of a ventilator (breathing machine) if it was available to you? \"   The patient would desire the use of a ventilator. Resuscitation: \"In the event your heart stopped as a result of an underlying serious health condition, would you want attempts to be made to restart your heart, or would you prefer a natural death? \"   Yes, attempt to resuscitate.     Additional topics discussed: treatment goals    Conversation Outcomes / Follow-Up Plan:   ACP complete - no further action today  Reviewed DNR/DNI and patient elects Full Code (Attempt Resuscitation)     Length of Voluntary ACP Conversation in minutes:  <16 minutes (Non-Billable)    Delvis Glez MD

## 2021-08-23 NOTE — PROGRESS NOTES
CC:   Chief Complaint   Patient presents with   [facility] Annual Wellness Visit     Medicare annual wellness       HISTORY OF PRESENT ILLNESS  Vivien Haley is a 68 y.o. male. Presents for Medicare AWV and 5 month follow up evaluation. He has HTN, hyperlipidemia, prediabetes/IGT, vitamin B12 deficiency, and history of TIA. No complaints today. The patient has hypertension, hyperlipidemia and history of TIA. Denies HA's, CP, SOB, wheezing, or leg swelling. He reports taking medications as instructed, no medication side effects noted. Diet and Lifestyle: generally follows a low fat low cholesterol diet, generally follows a low sodium diet, exercises regularly. Reports Cialis 5 mg does not work for him sometimes. Never picked up 10 mg dose prescriptions. Soc Hx  . 3 children and 7 grandchildren. Retired , prior to that worked at [employer]. Former smoker; quit in 2000. Infrequent alcohol. Walks and gardens for exercise. ROS  A complete review of systems was performed and is negative except for those mentioned in the HPI.     Patient Active Problem List   Diagnosis Code    Hypercholesterolemia E78.00    Hypertension, essential I10    S/P colonoscopy X49.737    ED (erectile dysfunction) N52.9    Actinic keratosis L57.0    COPD, moderate ([address] J44.9    Bilateral carotid artery stenosis I65.23    History of TIA (transient ischemic attack) Z86.73    Disturbance of memory R41.3    IGT (impaired glucose tolerance) R73.02    B12 deficiency E53.8     Past Medical History:   Diagnosis Date    Arthritis     knee    Cancer [address]     skin    Chronic obstructive pulmonary disease ([facility]     Hypercholesterolemia     Hypertension     Smoking 1/22/2010    Stopped 04/2000      Allergies   Allergen Reactions    Macrobid [Nitrofurantoin Monohyd/M-Cryst] Unknown (comments)    Tetracycline Unknown (comments)       Current Outpatient Medications   Medication Sig Dispense Refill    lisinopriL (PRINIVIL, ZESTRIL) 20 mg tablet TAKE 1 TABLET DAILY 90 Tab 3    loratadine (Claritin) 10 mg tablet Take 10 mg by mouth.  albuterol (PROVENTIL HFA, VENTOLIN HFA, PROAIR HFA) 90 mcg/actuation inhaler Take 2 Puffs by inhalation every four (4) hours as needed for Wheezing. Indications: chronic obstructive pulmonary disease 1 Inhaler 0    tadalafiL (Cialis) 10 mg tablet Take 1 Tab by mouth daily. 30 Tab 3    atorvastatin (LIPITOR) 40 mg tablet TAKE 1 TABLET NIGHTLY 90 Tab 3    clopidogreL (PLAVIX) 75 mg tab TAKE 1 TABLET DAILY 90 Tab 3    cyanocobalamin 1,000 mcg tablet Take 1,000 mcg by mouth daily. PHYSICAL EXAM  Visit Vitals  /79 (BP 1 Location: Left arm, BP Patient Position: Sitting, BP Cuff Size: Adult)   Pulse 65   Temp 98 °F (36.7 °C) (Oral)   Resp 16   Ht 5' 10\" (1.778 m)   Wt 161 lb (73 kg)   SpO2 98%   BMI 23.10 kg/m²       General: Well-developed and well-nourished, no distress. HEENT:  Head normocephalic/atraumatic, no scleral icterus  Lungs:  Clear to ausculation bilaterally. Good air movement. Heart:  Regular rate and rhythm, normal S1 and S2, no murmur, gallop, or rub  Extremities: No clubbing, cyanosis, or edema. Neurological: Alert and oriented. Psychiatric: Normal mood and affect. Behavior is normal.         ASSESSMENT AND PLAN    ICD-10-CM ICD-9-CM    1. Medicare annual wellness visit, subsequent  Z00.00 V70.0    2. Hypertension, essential  I10 401.9    3. Hypercholesterolemia  E78.00 272.0    4. COPD, moderate (HCC)  J44.9 496    5. IGT (impaired glucose tolerance)  R73.02 790.22    6. Erectile dysfunction, unspecified erectile dysfunction type  N52.9 607.84    7. Screening for depression  Z13.31 V79.0 Amadeo Davalos   8. Advance directive on file  Z78.9 V49.89      Diagnoses and all orders for this visit:    1. Medicare annual wellness visit, subsequent    2. Hypertension, essential  Controlled. Continue lisinopril 20 mg daily.     3. Hypercholesterolemia  Controlled. Continue atorvastatin 40 mg daily. 4. COPD, moderate ([address]  Last flare in March 2021. Uses albuterol as needed. 5. IGT (impaired glucose tolerance)  Last A1c 6.1% on 2/8/321. Counseled on diet and exercise. 6. Erectile dysfunction, unspecified erectile dysfunction type  Instructed him to change from 5 to 10 mg dose. Also instructed him to check Good Rx website for possible lower prices. 7. Screening for depression  -     DEPRESSION SCREEN ANNUAL    8. Advance directive on file      Follow-up and Dispositions    · Return in about 6 months (around 2/23/2022), or if symptoms worsen or fail to improve, for HTN, chol, COPD. I have discussed the diagnosis with the patient and the intended plan as seen in the above orders. Patient is in agreement. The patient has received an after-visit summary and questions were answered concerning future plans. I have discussed medication side effects and warnings with the patient as well.

## 2021-08-23 NOTE — PROGRESS NOTES
This is the Subsequent Medicare Annual Wellness Exam, performed 12 months or more after the Initial AWV or the last Subsequent AWV    I have reviewed the patient's medical history in detail and updated the computerized patient record. Assessment/Plan   Education and counseling provided:  Are appropriate based on today's review and evaluation  End-of-Life planning (with patient's consent)    1. Medicare annual wellness visit, subsequent  2. Screening for depression  -     DEPRESSION SCREEN ANNUAL       Depression Risk Factor Screening     3 most recent PHQ Screens 2/26/2021   Little interest or pleasure in doing things Not at all   Feeling down, depressed, irritable, or hopeless Not at all   Total Score PHQ 2 0       Alcohol Risk Screen    Do you average more than 1 drink per night or more than 7 drinks a week: No    In the past three months have you have had more than 4 drinks containing alcohol on one occasion: No        Functional Ability and Level of Safety    Hearing: Hearing is good. Activities of Daily Living: The home contains: no safety equipment. Patient does total self care      Ambulation: with no difficulty     Fall Risk:  Fall Risk Assessment, last 12 mths 2/26/2021   Able to walk? Yes   Fall in past 12 months? 0   Number of falls in past 12 months -   Fall with injury?  -      Abuse Screen:  Patient is not abused       Cognitive Screening    Has your family/caregiver stated any concerns about your memory: no     Cognitive Screening: Normal 5-min recall of 3 objects    Health Maintenance Due     Health Maintenance Due   Topic Date Due    COVID-19 Vaccine (1) Never done    Medicare Yearly Exam  08/18/2021       Patient Care Team   Patient Care Team:  Chanell Campos MD as PCP - General (Internal Medicine)  Chanell Campos MD as PCP - [facility] Empaneled Provider  Bernardo Garcia (Inactive) (Dermatology)  Rita Bonilla MD (Ophthalmology)    History     Patient Active Problem List   Diagnosis Code  Hypercholesterolemia E78.00    Hypertension, essential I10    S/P colonoscopy J76.714    ED (erectile dysfunction) N52.9    Actinic keratosis L57.0    COPD, moderate (HCC) J44.9    Bilateral carotid artery stenosis I65.23    History of TIA (transient ischemic attack) Z86.73    Disturbance of memory R41.3    IGT (impaired glucose tolerance) R73.02    B12 deficiency E53.8     Past Medical History:   Diagnosis Date    Arthritis     knee    Cancer ([facility] [address]     skin    Chronic obstructive pulmonary disease ([facility] [facility]     Hypercholesterolemia     Hypertension     Smoking 1/22/2010    Stopped 04/2000       Past Surgical History:   Procedure Laterality Date    COLORECTAL SCRN; HI RISK IND  01/12/2016    Dr. Nicky Kennedy. Diverticulosis, int hemorrhoids. Repeat in 5 yrs.  ENDOSCOPY, COLON, DIAGNOSTIC  07/16/2007    Dr Nicky Kennedy polyp repeat 07/2010    HX ORTHOPAEDIC Right 3/2016    arthroscopic; torn medial meniscus     Current Outpatient Medications   Medication Sig Dispense Refill    lisinopriL (PRINIVIL, ZESTRIL) 20 mg tablet TAKE 1 TABLET DAILY 90 Tab 3    loratadine (Claritin) 10 mg tablet Take 10 mg by mouth.  albuterol (PROVENTIL HFA, VENTOLIN HFA, PROAIR HFA) 90 mcg/actuation inhaler Take 2 Puffs by inhalation every four (4) hours as needed for Wheezing. Indications: chronic obstructive pulmonary disease 1 Inhaler 0    tadalafiL (Cialis) 10 mg tablet Take 1 Tab by mouth daily. 30 Tab 3    atorvastatin (LIPITOR) 40 mg tablet TAKE 1 TABLET NIGHTLY 90 Tab 3    clopidogreL (PLAVIX) 75 mg tab TAKE 1 TABLET DAILY 90 Tab 3    cyanocobalamin 1,000 mcg tablet Take 1,000 mcg by mouth daily.        Allergies   Allergen Reactions    Macrobid [Nitrofurantoin Monohyd/M-Cryst] Unknown (comments)    Tetracycline Unknown (comments)       Family History   Problem Relation Age of Onset    Hypertension Mother     Thyroid Disease Mother     Stroke Mother     Kidney Disease Father     Cancer Father skin    Diabetes Father     Neuropathy Brother     Cancer Paternal Aunt         lung    Cancer Paternal Uncle         skin    Diabetes Maternal Grandmother     Hypertension Child      Social History     Tobacco Use    Smoking status: Former Smoker     Packs/day: 2.00     Years: 40.00     Pack years: 80.00     Quit date: 2000     Years since quittin.4    Smokeless tobacco: Never Used   Substance Use Topics    Alcohol use:  Yes     Alcohol/week: 0.0 standard drinks     Types: 2 - 3 Cans of beer per week     Comment: Dana Awad MD

## 2021-08-23 NOTE — PROGRESS NOTES
Identified pt with two pt identifiers(name and ). Reviewed record in preparation for visit and have obtained necessary documentation. Chief Complaint   Patient presents with   [facility] Annual Wellness Visit     Medicare annual wellness        Vitals:    21 0756   BP: 124/79   Pulse: 65   Resp: 16   Temp: 98 °F (36.7 °C)   TempSrc: Oral   SpO2: 98%   Weight: 161 lb (73 kg)   Height: 5' 10\" (1.778 m)   PainSc:   0 - No pain       Health Maintenance Due   Topic    Medicare Yearly Exam        Coordination of Care Questionnaire:  :   1) Have you been to an emergency room, urgent care, or hospitalized since your last visit? If yes, where when, and reason for visit? no       2. Have seen or consulted any other health care provider since your last visit? If yes, where when, and reason for visit? NO      Patient is accompanied by self I have received verbal consent from Ryanne Proctor to discuss any/all medical information while they are present in the room.

## 2022-01-05 ENCOUNTER — PATIENT MESSAGE (OUTPATIENT)
Dept: INTERNAL MEDICINE CLINIC | Age: 77
End: 2022-01-05

## 2022-01-06 ENCOUNTER — VIRTUAL VISIT (OUTPATIENT)
Dept: INTERNAL MEDICINE CLINIC | Age: 77
End: 2022-01-06
Payer: MEDICARE

## 2022-01-06 DIAGNOSIS — J06.9 VIRAL URI: ICD-10-CM

## 2022-01-06 DIAGNOSIS — J40 BRONCHITIS: ICD-10-CM

## 2022-01-06 DIAGNOSIS — J44.9 COPD, MODERATE (HCC): Primary | ICD-10-CM

## 2022-01-06 PROCEDURE — 99213 OFFICE O/P EST LOW 20 MIN: CPT | Performed by: PHYSICIAN ASSISTANT

## 2022-01-06 PROCEDURE — 1101F PT FALLS ASSESS-DOCD LE1/YR: CPT | Performed by: PHYSICIAN ASSISTANT

## 2022-01-06 PROCEDURE — G8756 NO BP MEASURE DOC: HCPCS | Performed by: PHYSICIAN ASSISTANT

## 2022-01-06 PROCEDURE — G8432 DEP SCR NOT DOC, RNG: HCPCS | Performed by: PHYSICIAN ASSISTANT

## 2022-01-06 PROCEDURE — G8427 DOCREV CUR MEDS BY ELIG CLIN: HCPCS | Performed by: PHYSICIAN ASSISTANT

## 2022-01-06 RX ORDER — AZITHROMYCIN 250 MG/1
TABLET, FILM COATED ORAL
Qty: 6 TABLET | Refills: 0 | Status: SHIPPED | OUTPATIENT
Start: 2022-01-06 | End: 2022-01-11

## 2022-01-06 RX ORDER — PREDNISONE 10 MG/1
10 TABLET ORAL SEE ADMIN INSTRUCTIONS
Qty: 21 TABLET | Refills: 0 | Status: SHIPPED | OUTPATIENT
Start: 2022-01-06 | End: 2022-01-27

## 2022-01-06 NOTE — PROGRESS NOTES
Deb Solorzano is a 68 y.o. male who was seen by synchronous (real-time) audio-video technology on 1/6/2022 for Cold Symptoms (chest congestion)        Assessment & Plan:   Diagnoses and all orders for this visit:    1. COPD, moderate ([address]  Continue prn albuterol use when not sick. Currently recommended to use every 4-6 hours as needed for SOB with bronchitis  2. Viral URI  Recommended quarantining full 10 days from symptom start as if he had covid  3. Bronchitis  -     azithromycin (ZITHROMAX) 250 mg tablet; TAKE 2 TABLETS PO DAY 1, THEN 1 TAB DAYS 2-5  -     predniSONE (STERAPRED DS) 10 mg dose pack; Take 1 Tablet by mouth See Admin Instructions. See administration instruction per 10mg dose pack  Will treat with abx and steroids. Given pneumonia and hospital precautions. Discussed post viral cough and fatigue to expect and discussed s/e of antibiotics and steroids. The complexity of medical decision making for this visit is moderate     I spent at least 20 minutes on this visit with this established patient. 712  Subjective:   Patient evaluated over [employer] and then telephone after audio problems occurred with mychart telemed. He reports having lost his taste 5 days ago and trying to get covid tested but there were no places available without incredibly long waits. He notes he has since developed congestion moving from his sinus' into his chest. He is now having more wheezing, more SOB with activity and coughing up clear phlegm. He has no fevers or chills, no CP, N/V/D or other problems today. He had same thing last several years in winter. Dx'd with bronchitis. Hx of COPD/Asthma from chart hx. Uses albuterol sporadically when not sick. Prior to Admission medications    Medication Sig Start Date End Date Taking? Authorizing Provider   albuterol (PROVENTIL HFA, VENTOLIN HFA, PROAIR HFA) 90 mcg/actuation inhaler Take 2 Puffs by inhalation every four (4) hours as needed for Wheezing.  Indications: chronic obstructive pulmonary disease 1/4/22  Yes Sonal Hernandez MD   tadalafiL (CIALIS) 5 mg tablet TAKE 1 TABLET BY MOUTH EVERY DAY 10/13/21  Yes Loren Wu MD   lisinopriL (PRINIVIL, ZESTRIL) 20 mg tablet TAKE 1 TABLET DAILY 5/11/21  Yes Loren Wu MD   atorvastatin (LIPITOR) 40 mg tablet TAKE 1 TABLET NIGHTLY 2/1/21  Yes Loren Wu MD   clopidogreL (PLAVIX) 75 mg tab TAKE 1 TABLET DAILY 1/15/21  Yes Sonal Hernandez MD   cyanocobalamin 1,000 mcg tablet Take 1,000 mcg by mouth daily. Yes Provider, Historical   loratadine (Claritin) 10 mg tablet Take 10 mg by mouth. Provider, Historical     Patient Active Problem List   Diagnosis Code    Hypercholesterolemia E78.00    Hypertension, essential I10    S/P colonoscopy H66.659    ED (erectile dysfunction) N52.9    Actinic keratosis L57.0    COPD, moderate (HCC) J44.9    Bilateral carotid artery stenosis I65.23    History of TIA (transient ischemic attack) Z86.73    Disturbance of memory R41.3    IGT (impaired glucose tolerance) R73.02    B12 deficiency E53.8     Patient Active Problem List    Diagnosis Date Noted    IGT (impaired glucose tolerance) 02/17/2020    B12 deficiency 02/17/2020    Bilateral carotid artery stenosis 01/02/2020    History of TIA (transient ischemic attack) 01/02/2020    Disturbance of memory 01/02/2020    COPD, moderate ([address] 09/13/2014    Actinic keratosis 01/05/2012    ED (erectile dysfunction) 02/22/2010    Hypercholesterolemia 01/22/2010    Hypertension, essential 01/22/2010    S/P colonoscopy 01/22/2010     Current Outpatient Medications   Medication Sig Dispense Refill    azithromycin (ZITHROMAX) 250 mg tablet TAKE 2 TABLETS PO DAY 1, THEN 1 TAB DAYS 2-5 6 Tablet 0    predniSONE (STERAPRED DS) 10 mg dose pack Take 1 Tablet by mouth See Admin Instructions.  See administration instruction per 10mg dose pack 21 Tablet 0    albuterol (PROVENTIL HFA, VENTOLIN HFA, PROAIR HFA) 90 mcg/actuation inhaler Take 2 Puffs by inhalation every four (4) hours as needed for Wheezing. Indications: chronic obstructive pulmonary disease 3 Each 3    tadalafiL (CIALIS) 5 mg tablet TAKE 1 TABLET BY MOUTH EVERY DAY 30 Tablet 3    lisinopriL (PRINIVIL, ZESTRIL) 20 mg tablet TAKE 1 TABLET DAILY 90 Tab 3    atorvastatin (LIPITOR) 40 mg tablet TAKE 1 TABLET NIGHTLY 90 Tab 3    clopidogreL (PLAVIX) 75 mg tab TAKE 1 TABLET DAILY 90 Tab 3    cyanocobalamin 1,000 mcg tablet Take 1,000 mcg by mouth daily.  loratadine (Claritin) 10 mg tablet Take 10 mg by mouth. Allergies   Allergen Reactions    Macrobid [Nitrofurantoin Monohyd/M-Cryst] Unknown (comments)    Tetracycline Unknown (comments)     Past Medical History:   Diagnosis Date    Arthritis     knee    Cancer [address] [address]     skin    Chronic obstructive pulmonary disease ([facility] [address]     Hypercholesterolemia     Hypertension     Smoking 2010    Stopped 2000      Past Surgical History:   Procedure Laterality Date    COLORECTAL SCRN; HI RISK IND  2016    Dr. Gloriajean Aschoff. Diverticulosis, int hemorrhoids. Repeat in 5 yrs.  ENDOSCOPY, COLON, DIAGNOSTIC  2007    Dr Gloriajean Aschoff polyp repeat 2010    HX ORTHOPAEDIC Right 3/2016    arthroscopic; torn medial meniscus     Family History   Problem Relation Age of Onset    Hypertension Mother     Thyroid Disease Mother     Stroke Mother     Kidney Disease Father     Cancer Father         skin    Diabetes Father     Neuropathy Brother     Cancer Paternal Aunt         lung    Cancer Paternal Uncle         skin    Diabetes Maternal Grandmother     Hypertension Child      Social History     Tobacco Use    Smoking status: Former Smoker     Packs/day: 2.00     Years: 40.00     Pack years: 80.00     Quit date: 2000     Years since quittin.7    Smokeless tobacco: Never Used   Substance Use Topics    Alcohol use:  Yes     Alcohol/week: 0.0 standard drinks     Types: 2 - 3 Cans of beer per week Comment: Soc       Review of Systems   Constitutional: Negative for chills, fever, malaise/fatigue and weight loss. Respiratory: Positive for cough, sputum production, shortness of breath and wheezing. Cardiovascular: Negative for chest pain, palpitations and leg swelling. Gastrointestinal: Negative for abdominal pain, blood in stool, constipation, diarrhea, heartburn, melena, nausea and vomiting. Genitourinary: Negative for dysuria and frequency. Musculoskeletal: Negative for myalgias. Skin: Negative for rash. Neurological: Negative for dizziness, weakness and headaches. Endo/Heme/Allergies: Does not bruise/bleed easily. Psychiatric/Behavioral: Negative for depression. All other systems reviewed and are negative. Objective:     Patient-Reported Vitals 1/6/2022   Patient-Reported Weight -   Patient-Reported Height -   Patient-Reported Pulse 77   Patient-Reported Temperature 97.2   Patient-Reported Systolic  619   Patient-Reported Diastolic 76      General: alert, cooperative, no distress   Mental  status: normal mood, behavior, speech, dress, motor activity, and thought processes, able to follow commands   HENT: NCAT   Neck: no visualized mass   Resp: no respiratory distress   Neuro: no gross deficits   Skin: no discoloration or lesions of concern on visible areas   Psychiatric: normal affect, consistent with stated mood, no evidence of hallucinations     Additional exam findings: We discussed the expected course, resolution and complications of the diagnosis(es) in detail. Medication risks, benefits, costs, interactions, and alternatives were discussed as indicated. I advised him to contact the office if his condition worsens, changes or fails to improve as anticipated. He expressed understanding with the diagnosis(es) and plan. Leslie Pedro, was evaluated through a synchronous (real-time) audio-video encounter.  The patient (or guardian if applicable) is aware that this is a billable service. Verbal consent to proceed has been obtained within the past 12 months. The visit was conducted pursuant to the emergency declaration under the [address] [place] [address] and the [email or web address] and [employer] Act. Patient identification was verified, and a caregiver was present when appropriate. The patient was located in a state where the provider was credentialed to provide care.     Bety Payne PA-C

## 2022-01-06 NOTE — PROGRESS NOTES
Blanca Casper  Identified pt with two pt identifiers(name and ). Chief Complaint   Patient presents with    Cold Symptoms     chest congestion       Reviewed record In preparation for visit and have obtained necessary documentation. 1. Have you been to the ER, urgent care clinic or hospitalized since your last visit? No     2. Have you seen or consulted any other health care providers outside of the [address] [place] [address] since your last visit? Include any pap smears or colon screening. No    Vitals reviewed with provider.     Health Maintenance reviewed:     Health Maintenance Due   Topic    Flu Vaccine (1)    COVID-19 Vaccine (3 - Booster for Moderna series)          Wt Readings from Last 3 Encounters:   21 161 lb (73 kg)   21 167 lb 9.6 oz (76 kg)   20 162 lb 12.8 oz (73.8 kg)        Temp Readings from Last 3 Encounters:   21 98 °F (36.7 °C) (Oral)   21 97.8 °F (36.6 °C) (Oral)   20 97.7 °F (36.5 °C) (Oral)        BP Readings from Last 3 Encounters:   21 124/79   21 100/68   20 129/82        Pulse Readings from Last 3 Encounters:   21 65   21 70   20 69        Vitals:    22 0700   PainSc:   0 - No pain          Learning Assessment:   :       Learning Assessment 2017   PRIMARY LEARNER Patient Patient Patient   HIGHEST LEVEL OF EDUCATION - PRIMARY LEARNER  - - DID NOT GRADUATE HIGH SCHOOL   BARRIERS PRIMARY LEARNER - - NONE   CO-LEARNER CAREGIVER - - No   PRIMARY LANGUAGE ENGLISH ENGLISH ENGLISH   LEARNER PREFERENCE PRIMARY DEMONSTRATION DEMONSTRATION READING   ANSWERED BY patient patient self   RELATIONSHIP SELF SELF SELF        Depression Screening:   :       3 most recent PHQ Screens 2022   Little interest or pleasure in doing things Not at all   Feeling down, depressed, irritable, or hopeless Not at all   Total Score PHQ 2 0        Fall Risk Assessment:   :       Fall Risk Assessment, last 12 mths 8/23/2021   Able to walk? Yes   Fall in past 12 months? 0   Do you feel unsteady? 0   Are you worried about falling 0   Number of falls in past 12 months -   Fall with injury? -        Abuse Screening:   :       Abuse Screening Questionnaire 8/23/2021 8/17/2020 8/26/2019 10/15/2018 10/24/2017 10/11/2016 9/10/2015   Do you ever feel afraid of your partner? N N N N N N N   Are you in a relationship with someone who physically or mentally threatens you? N N N N N N N   Is it safe for you to go home?  Y Y Y Y Y Y Y        ADL Screening:   :       ADL Assessment 8/23/2021   Feeding yourself No Help Needed   Getting from bed to chair No Help Needed   Getting dressed No Help Needed   Bathing or showering No Help Needed   Walk across the room (includes cane/walker) No Help Needed   Using the telphone No Help Needed   Taking your medications No Help Needed   Preparing meals No Help Needed   Managing money (expenses/bills) No Help Needed   Moderately strenuous housework (laundry) No Help Needed   Shopping for personal items (toiletries/medicines) No Help Needed   Shopping for groceries No Help Needed   Driving No Help Needed   Climbing a flight of stairs No Help Needed   Getting to places beyond walking distances No Help Needed

## 2022-01-10 DIAGNOSIS — G45.1 TRANSIENT ISCHEMIC ATTACK INVOLVING RIGHT INTERNAL CAROTID ARTERY: ICD-10-CM

## 2022-01-10 RX ORDER — CLOPIDOGREL BISULFATE 75 MG/1
TABLET ORAL
Qty: 90 TABLET | Refills: 3 | Status: SHIPPED | OUTPATIENT
Start: 2022-01-10

## 2022-01-10 NOTE — TELEPHONE ENCOUNTER
Requested Prescriptions     Pending Prescriptions Disp Refills    clopidogreL (PLAVIX) 75 mg tab 90 Tablet 3     Si Tablet daily.

## 2022-01-10 NOTE — TELEPHONE ENCOUNTER
PCP: Jose Roberto Law MD     Last appt: 1/6/2022   Future Appointments   Date Time Provider Bertrand Martini   2/23/2022  7:50 AM Jose Roberto Law MD [facility] BS AMB        Requested Prescriptions     Pending Prescriptions Disp Refills    clopidogreL (PLAVIX) 75 mg tab 90 Tablet 3     Sig: TAKE 1 TABLET DAILY

## 2022-01-18 DIAGNOSIS — E78.00 HYPERCHOLESTEROLEMIA: ICD-10-CM

## 2022-01-18 RX ORDER — ATORVASTATIN CALCIUM 40 MG/1
TABLET, FILM COATED ORAL
Qty: 90 TABLET | Refills: 3 | Status: SHIPPED | OUTPATIENT
Start: 2022-01-18

## 2022-01-18 NOTE — TELEPHONE ENCOUNTER
PCP: Elisha Correa MD     Last appt: 1/6/2022   Future Appointments   Date Time Provider Bertrand Martini   2/23/2022  7:50 AM Elisha Correa MD [facility] BS AMB        Requested Prescriptions     Pending Prescriptions Disp Refills    atorvastatin (LIPITOR) 40 mg tablet 90 Tablet 3     Sig: TAKE 1 TABLET NIGHTLY

## 2022-01-18 NOTE — TELEPHONE ENCOUNTER
Requested Prescriptions     Pending Prescriptions Disp Refills    atorvastatin (LIPITOR) 40 mg tablet 90 Tablet 3     Si Tablet nightly.

## 2022-01-27 ENCOUNTER — VIRTUAL VISIT (OUTPATIENT)
Dept: INTERNAL MEDICINE CLINIC | Age: 77
End: 2022-01-27
Payer: MEDICARE

## 2022-01-27 DIAGNOSIS — K57.92 DIVERTICULITIS: Primary | ICD-10-CM

## 2022-01-27 PROCEDURE — G8756 NO BP MEASURE DOC: HCPCS | Performed by: INTERNAL MEDICINE

## 2022-01-27 PROCEDURE — 1101F PT FALLS ASSESS-DOCD LE1/YR: CPT | Performed by: INTERNAL MEDICINE

## 2022-01-27 PROCEDURE — G8427 DOCREV CUR MEDS BY ELIG CLIN: HCPCS | Performed by: INTERNAL MEDICINE

## 2022-01-27 PROCEDURE — G8432 DEP SCR NOT DOC, RNG: HCPCS | Performed by: INTERNAL MEDICINE

## 2022-01-27 PROCEDURE — 99214 OFFICE O/P EST MOD 30 MIN: CPT | Performed by: INTERNAL MEDICINE

## 2022-01-27 RX ORDER — AMOXICILLIN AND CLAVULANATE POTASSIUM 875; 125 MG/1; MG/1
1 TABLET, FILM COATED ORAL EVERY 12 HOURS
Qty: 20 TABLET | Refills: 0 | Status: SHIPPED | OUTPATIENT
Start: 2022-01-27 | End: 2022-02-06

## 2022-01-27 NOTE — PROGRESS NOTES
Violeta Galindo is a 68 y.o. male who was seen by synchronous (real-time) audio-video technology on 1/27/2022 for Abdominal Pain (Ongoing for 3 days. Pt states that it is a sharp pain. Pt states that he ate a bunch of nuts and thinks that it is linked to abdominal pain )        Assessment & Plan:   Diagnoses and all orders for this visit:    1. Diverticulitis  -     amoxicillin-clavulanate (AUGMENTIN) 875-125 mg per tablet; Take 1 Tablet by mouth every twelve (12) hours for 10 days. History of small bowel diverticulitis in past - treat as above, soft foods, if pain worsens to ER      Subjective:     Has had abdominal pain similar to diverticulitis for a few days  Ate nuts few days ago which he thinks is trigger  Pain across abdomen, upper   No change in bowels until yesterday - mild constipation today  No blood in stool or melena  No n/v  Reports tender around navel with pressure  +appendix in place  Appetite is okay  Pain not changing since started - 3 days ago  No f/c or sweats  Using heating pad        Prior to Admission medications    Medication Sig Start Date End Date Taking? Authorizing Provider   amoxicillin-clavulanate (AUGMENTIN) 875-125 mg per tablet Take 1 Tablet by mouth every twelve (12) hours for 10 days. 1/27/22 2/6/22 Yes Darío Lambert MD   atorvastatin (LIPITOR) 40 mg tablet TAKE 1 TABLET NIGHTLY 1/18/22  Yes Jose Roberto Law MD   clopidogreL (PLAVIX) 75 mg tab TAKE 1 TABLET DAILY 1/10/22  Yes Jose Roberto Law MD   albuterol (PROVENTIL HFA, VENTOLIN HFA, PROAIR HFA) 90 mcg/actuation inhaler Take 2 Puffs by inhalation every four (4) hours as needed for Wheezing.  Indications: chronic obstructive pulmonary disease 1/4/22  Yes Trish Hernandez MD   tadalafiL (CIALIS) 5 mg tablet TAKE 1 TABLET BY MOUTH EVERY DAY 10/13/21  Yes Jose Roberto Law MD   lisinopriL (PRINIVIL, ZESTRIL) 20 mg tablet TAKE 1 TABLET DAILY 5/11/21  Yes Trish Hernandez MD   cyanocobalamin 1,000 mcg tablet Take 1,000 mcg by mouth daily. Yes Provider, Historical   predniSONE (STERAPRED DS) 10 mg dose pack Take 1 Tablet by mouth See Admin Instructions.  See administration instruction per 10mg dose pack  Patient not taking: Reported on 1/27/2022 1/6/22 1/27/22  Adriano Rdz PA-C   loratadine (Claritin) 10 mg tablet Take 10 mg by mouth.  1/27/22  Provider, Historical         ROS    Objective:     Patient-Reported Vitals 1/6/2022   Patient-Reported Weight -   Patient-Reported Height -   Patient-Reported Pulse 77   Patient-Reported Temperature 97.2   Patient-Reported Systolic  998   Patient-Reported Diastolic 76        [INSTRUCTIONS:  \"[x]\" Indicates a positive item  \"[]\" Indicates a negative item  -- DELETE ALL ITEMS NOT EXAMINED]    Constitutional: [x] Appears well-developed and well-nourished [x] No apparent distress      [] Abnormal -     Mental status: [x] Alert and awake  [x] Oriented to person/place/time [x] Able to follow commands    [] Abnormal -     Eyes:   EOM    [x]  Normal    [] Abnormal -   Sclera  [x]  Normal    [] Abnormal -          Discharge [x]  None visible   [] Abnormal -     HENT: [x] Normocephalic, atraumatic  [] Abnormal -   [x] Mouth/Throat: Mucous membranes are moist    External Ears [x] Normal  [] Abnormal -    Neck: [x] No visualized mass [] Abnormal -     Pulmonary/Chest: [x] Respiratory effort normal   [x] No visualized signs of difficulty breathing or respiratory distress        [] Abnormal -      Musculoskeletal:   [x] Normal gait with no signs of ataxia         [x] Normal range of motion of neck        [] Abnormal -     Neurological:        [x] No Facial Asymmetry (Cranial nerve 7 motor function) (limited exam due to video visit)          [x] No gaze palsy        [] Abnormal -          Skin:        [x] No significant exanthematous lesions or discoloration noted on facial skin         [] Abnormal -            Psychiatric:       [x] Normal Affect [] Abnormal -        [x] No Hallucinations    Other pertinent observable physical exam findings:-        We discussed the expected course, resolution and complications of the diagnosis(es) in detail. Medication risks, benefits, costs, interactions, and alternatives were discussed as indicated. I advised him to contact the office if his condition worsens, changes or fails to improve as anticipated. He expressed understanding with the diagnosis(es) and plan. Bertodo Pandya, was evaluated through a synchronous (real-time) audio-video encounter. The patient (or guardian if applicable) is aware that this is a billable service, which includes applicable co-pays. Verbal consent to proceed has been obtained. The visit was conducted pursuant to the emergency declaration under the [facility], [address] [place] [address] authority and the [email or web address] General Act. Patient identification was verified, and a caregiver was present when appropriate. The patient was located at home in a state where the provider was licensed to provide care.       Dyllan Benito MD

## 2022-01-27 NOTE — PROGRESS NOTES
Toya Reyes  Identified pt with two pt identifiers(name and ). Chief Complaint   Patient presents with    Abdominal Pain     Ongoing for 3 days. Pt states that it is a sharp pain. Pt states that he ate a bunch of nuts and thinks that it is linked to abdominal pain        Reviewed record In preparation for visit and have obtained necessary documentation. 1. Have you been to the ER, urgent care clinic or hospitalized since your last visit? No     2. Have you seen or consulted any other health care providers outside of the [address] [place] [address] since your last visit? Include any pap smears or colon screening. No    Patient has an advance directive. Vitals reviewed with provider. Health Maintenance reviewed:     Health Maintenance Due   Topic    Flu Vaccine (1)    COVID-19 Vaccine (3 - Booster for Moderna series)    Lipid Screen           Wt Readings from Last 3 Encounters:   21 161 lb (73 kg)   21 167 lb 9.6 oz (76 kg)   20 162 lb 12.8 oz (73.8 kg)        Temp Readings from Last 3 Encounters:   21 98 °F (36.7 °C) (Oral)   21 97.8 °F (36.6 °C) (Oral)   20 97.7 °F (36.5 °C) (Oral)        BP Readings from Last 3 Encounters:   21 124/79   21 100/68   20 129/82        Pulse Readings from Last 3 Encounters:   21 65   21 70   20 69      There were no vitals filed for this visit.        Learning Assessment:   :       Learning Assessment 2017   PRIMARY LEARNER Patient Patient Patient   HIGHEST LEVEL OF EDUCATION - PRIMARY LEARNER  - - DID NOT GRADUATE HIGH SCHOOL   BARRIERS PRIMARY LEARNER - - NONE   CO-LEARNER CAREGIVER - - No   PRIMARY LANGUAGE ENGLISH ENGLISH ENGLISH   LEARNER PREFERENCE PRIMARY DEMONSTRATION DEMONSTRATION READING   ANSWERED BY patient patient self   RELATIONSHIP SELF SELF SELF        Depression Screening:   :       3 most recent PHQ Screens 2022   Little interest or pleasure in doing things Not at all   Feeling down, depressed, irritable, or hopeless Not at all   Total Score PHQ 2 0        Fall Risk Assessment:   :       Fall Risk Assessment, last 12 mths 8/23/2021   Able to walk? Yes   Fall in past 12 months? 0   Do you feel unsteady? 0   Are you worried about falling 0   Number of falls in past 12 months -   Fall with injury? -        Abuse Screening:   :       Abuse Screening Questionnaire 8/23/2021 8/17/2020 8/26/2019 10/15/2018 10/24/2017 10/11/2016 9/10/2015   Do you ever feel afraid of your partner? N N N N N N N   Are you in a relationship with someone who physically or mentally threatens you? N N N N N N N   Is it safe for you to go home?  Y Y Y Y Y Y Y        ADL Screening:   :       ADL Assessment 8/23/2021   Feeding yourself No Help Needed   Getting from bed to chair No Help Needed   Getting dressed No Help Needed   Bathing or showering No Help Needed   Walk across the room (includes cane/walker) No Help Needed   Using the telphone No Help Needed   Taking your medications No Help Needed   Preparing meals No Help Needed   Managing money (expenses/bills) No Help Needed   Moderately strenuous housework (laundry) No Help Needed   Shopping for personal items (toiletries/medicines) No Help Needed   Shopping for groceries No Help Needed   Driving No Help Needed   Climbing a flight of stairs No Help Needed   Getting to places beyond walking distances No Help Needed

## 2022-02-23 ENCOUNTER — OFFICE VISIT (OUTPATIENT)
Dept: INTERNAL MEDICINE CLINIC | Age: 77
End: 2022-02-23
Payer: MEDICARE

## 2022-02-23 VITALS
HEIGHT: 70 IN | RESPIRATION RATE: 12 BRPM | DIASTOLIC BLOOD PRESSURE: 70 MMHG | WEIGHT: 163.5 LBS | SYSTOLIC BLOOD PRESSURE: 101 MMHG | HEART RATE: 73 BPM | OXYGEN SATURATION: 95 % | TEMPERATURE: 98.3 F | BODY MASS INDEX: 23.41 KG/M2

## 2022-02-23 DIAGNOSIS — I10 HYPERTENSION, ESSENTIAL: Primary | ICD-10-CM

## 2022-02-23 DIAGNOSIS — R73.03 PREDIABETES: ICD-10-CM

## 2022-02-23 DIAGNOSIS — E78.00 HYPERCHOLESTEROLEMIA: ICD-10-CM

## 2022-02-23 DIAGNOSIS — J44.9 COPD, MODERATE (HCC): ICD-10-CM

## 2022-02-23 PROCEDURE — G8420 CALC BMI NORM PARAMETERS: HCPCS | Performed by: INTERNAL MEDICINE

## 2022-02-23 PROCEDURE — G8754 DIAS BP LESS 90: HCPCS | Performed by: INTERNAL MEDICINE

## 2022-02-23 PROCEDURE — 1101F PT FALLS ASSESS-DOCD LE1/YR: CPT | Performed by: INTERNAL MEDICINE

## 2022-02-23 PROCEDURE — G8752 SYS BP LESS 140: HCPCS | Performed by: INTERNAL MEDICINE

## 2022-02-23 PROCEDURE — G8427 DOCREV CUR MEDS BY ELIG CLIN: HCPCS | Performed by: INTERNAL MEDICINE

## 2022-02-23 PROCEDURE — 99214 OFFICE O/P EST MOD 30 MIN: CPT | Performed by: INTERNAL MEDICINE

## 2022-02-23 PROCEDURE — G8536 NO DOC ELDER MAL SCRN: HCPCS | Performed by: INTERNAL MEDICINE

## 2022-02-23 PROCEDURE — G8432 DEP SCR NOT DOC, RNG: HCPCS | Performed by: INTERNAL MEDICINE

## 2022-02-23 NOTE — PROGRESS NOTES
CC:   Chief Complaint   Patient presents with    Hypertension    COPD    Cholesterol Problem       HISTORY OF PRESENT ILLNESS  Toya Reyes is a 68 y.o. male. Presents for 6 month follow up evaluation. He has HTN, hyperlipidemia, prediabetes/IGT, vitamin B12 deficiency, and history of TIA.        Was seen by another provider at this clinic for COPD exacerbation in Jan and acute diverticulitis in early Feb. Both better now. Has noticed increased food intolerances as he has gotten older, e.g., ice cream now causes stomach cramps. The patient has hypertension, hyperlipidemia and history of TIA. Denies HA's, CP, heart palpitations, SOB, wheezing, or leg swelling. He reports taking medications as instructed, no medication side effects noted. Diet and Lifestyle: generally follows a low fat low cholesterol diet, generally follows a low sodium diet, exercises regularly.        Soc Hx  . 3 children and 7 grandchildren. Retired , prior to that worked at [employer]. Former smoker; quit in 2000. Infrequent alcohol. Walks 1-2 miles a day and gardens for exercise. ROS  A complete review of systems was performed and is negative except for those mentioned in the HPI.     Patient Active Problem List   Diagnosis Code    Hypercholesterolemia E78.00    Hypertension, essential I10    S/P colonoscopy T39.783    ED (erectile dysfunction) N52.9    Actinic keratosis L57.0    COPD, moderate ([address] J44.9    Bilateral carotid artery stenosis I65.23    History of TIA (transient ischemic attack) Z86.73    Disturbance of memory R41.3    IGT (impaired glucose tolerance) R73.02    B12 deficiency E53.8     Past Medical History:   Diagnosis Date    Arthritis     knee    Cancer ([address]     skin    Chronic obstructive pulmonary disease ([address]     Hypercholesterolemia     Hypertension     Smoking 1/22/2010    Stopped 04/2000      Allergies   Allergen Reactions    Macrobid [Nitrofurantoin Monohyd/M-Cryst] Unknown (comments)    Tetracycline Unknown (comments)       Current Outpatient Medications   Medication Sig Dispense Refill    atorvastatin (LIPITOR) 40 mg tablet TAKE 1 TABLET NIGHTLY 90 Tablet 3    clopidogreL (PLAVIX) 75 mg tab TAKE 1 TABLET DAILY 90 Tablet 3    albuterol (PROVENTIL HFA, VENTOLIN HFA, PROAIR HFA) 90 mcg/actuation inhaler Take 2 Puffs by inhalation every four (4) hours as needed for Wheezing. Indications: chronic obstructive pulmonary disease 3 Each 3    tadalafiL (CIALIS) 5 mg tablet TAKE 1 TABLET BY MOUTH EVERY DAY (Patient taking differently: Prn) 30 Tablet 3    lisinopriL (PRINIVIL, ZESTRIL) 20 mg tablet TAKE 1 TABLET DAILY 90 Tab 3    cyanocobalamin 1,000 mcg tablet Take 1,000 mcg by mouth daily. PHYSICAL EXAM  Visit Vitals  /70 (BP 1 Location: Left upper arm, BP Patient Position: Sitting)   Pulse 73   Temp 98.3 °F (36.8 °C) (Oral)   Resp 12   Ht 5' 10\" (1.778 m)   Wt 163 lb 8 oz (74.2 kg)   SpO2 95%   BMI 23.46 kg/m²       General: Well-developed and well-nourished, no distress. HEENT:  Head normocephalic/atraumatic, no scleral icterus  Lungs:  Clear to ausculation bilaterally. Good air movement. Heart:  Regular rate and rhythm, normal S1 and S2, no murmur, gallop, or rub  Extremities: No clubbing, cyanosis, or edema. Neurological: Alert and oriented. Psychiatric: Normal mood and affect.  Behavior is normal.     Results for orders placed or performed in visit on 01/17/22   HEMOGLOBIN A1C WITH EAG   Result Value Ref Range    Hemoglobin A1c 6.2 (H) 4.8 - 5.6 %    Estimated average glucose 131 mg/dL   LIPID PANEL   Result Value Ref Range    Cholesterol, total 135 100 - 199 mg/dL    Triglyceride 105 0 - 149 mg/dL    HDL Cholesterol 46 >39 mg/dL    VLDL, calculated 19 5 - 40 mg/dL    LDL, calculated 70 0 - 99 mg/dL   METABOLIC PANEL, COMPREHENSIVE   Result Value Ref Range    Glucose 95 65 - 99 mg/dL    BUN 10 8 - 27 mg/dL    Creatinine 1.16 0.76 - 1.27 mg/dL    GFR est non-AA 61 >59 mL/min/1.73    GFR est AA 70 >59 mL/min/1.73    BUN/Creatinine ratio 9 (L) 10 - 24    Sodium 140 134 - 144 mmol/L    Potassium 4.9 3.5 - 5.2 mmol/L    Chloride 103 96 - 106 mmol/L    CO2 25 20 - 29 mmol/L    Calcium 10.0 8.6 - 10.2 mg/dL    Protein, total 7.0 6.0 - 8.5 g/dL    Albumin 4.2 3.7 - 4.7 g/dL    GLOBULIN, TOTAL 2.8 1.5 - 4.5 g/dL    A-G Ratio 1.5 1.2 - 2.2    Bilirubin, total 0.4 0.0 - 1.2 mg/dL    Alk. phosphatase 136 (H) 44 - 121 IU/L    AST (SGOT) 27 0 - 40 IU/L    ALT (SGPT) 30 0 - 44 IU/L   CBC WITH AUTOMATED DIFF   Result Value Ref Range    WBC 9.1 3.4 - 10.8 x10E3/uL    RBC 4.99 4.14 - 5.80 x10E6/uL    HGB 14.9 13.0 - 17.7 g/dL    HCT 45.7 37.5 - 51.0 %    MCV 92 79 - 97 fL    MCH 29.9 26.6 - 33.0 pg    MCHC 32.6 31.5 - 35.7 g/dL    RDW 12.8 11.6 - 15.4 %    PLATELET 748 809 - 196 x10E3/uL    NEUTROPHILS 71 Not Estab. %    Lymphocytes 18 Not Estab. %    MONOCYTES 8 Not Estab. %    EOSINOPHILS 2 Not Estab. %    BASOPHILS 1 Not Estab. %    ABS. NEUTROPHILS 6.4 1.4 - 7.0 x10E3/uL    Abs Lymphocytes 1.6 0.7 - 3.1 x10E3/uL    ABS. MONOCYTES 0.7 0.1 - 0.9 x10E3/uL    ABS. EOSINOPHILS 0.2 0.0 - 0.4 x10E3/uL    ABS. BASOPHILS 0.1 0.0 - 0.2 x10E3/uL    IMMATURE GRANULOCYTES 0 Not Estab. %    ABS. IMM. GRANS. 0.0 0.0 - 0.1 x10E3/uL   VITAMIN B12   Result Value Ref Range    Vitamin B12 929 232 - 1,245 pg/mL         ASSESSMENT AND PLAN    ICD-10-CM ICD-9-CM    1. Hypertension, essential  I10 401.9    2. Hypercholesterolemia  E78.00 272.0    3. Prediabetes  R73.03 790.29    4. COPD, moderate ([facility]  J44.9 496      Discussed lab results from 2/14/22. Prediabetes with A1c 6.2%, was 6.0%, was 6.0% a yr ago. Normal kidney and liver tests, blood counts, cholesterol (tot chol 135, LDL 70), and vitamin B12. Diagnoses and all orders for this visit:    1. Hypertension, essential  Well-controlled. Continue lisinopril 20 mg daily.      2. Hypercholesterolemia  Continue lisinopril 20 mg daily.    3. Prediabetes  Counseled on diet, exercise, and weight loss. 4. COPD, moderate ([address]  Last flare in early Jan 2022  Responded well to prednisone; says it helped his shoulder pain also. Continue albuterol as needed. Follow-up and Dispositions    · Return in about 6 months (around 8/23/2022), or if symptoms worsen or fail to improve, for Medicare AW, POC A1c. I have discussed the diagnosis with the patient and the intended plan as seen in the above orders. Patient is in agreement. The patient has received an after-visit summary and questions were answered concerning future plans. I have discussed medication side effects and warnings with the patient as well.

## 2022-02-23 NOTE — PROGRESS NOTES
Caren Mora  Identified pt with two pt identifiers(name and ). Chief Complaint   Patient presents with    Hypertension    COPD    Cholesterol Problem       Reviewed record In preparation for visit and have obtained necessary documentation. 1. Have you been to the ER, urgent care clinic or hospitalized since your last visit? No     2. Have you seen or consulted any other health care providers outside of the [address] [place] [address] since your last visit? Include any pap smears or colon screening. No    Vitals reviewed with provider.     Health Maintenance reviewed:     Health Maintenance Due   Topic    COVID-19 Vaccine (3 - Booster for Moderna series)          Wt Readings from Last 3 Encounters:   22 163 lb 8 oz (74.2 kg)   21 161 lb (73 kg)   21 167 lb 9.6 oz (76 kg)        Temp Readings from Last 3 Encounters:   22 98.3 °F (36.8 °C) (Oral)   21 98 °F (36.7 °C) (Oral)   21 97.8 °F (36.6 °C) (Oral)        BP Readings from Last 3 Encounters:   22 101/70   21 124/79   21 100/68        Pulse Readings from Last 3 Encounters:   22 73   21 65   21 70        Vitals:    22 0752   BP: 101/70   Pulse: 73   Resp: 12   Temp: 98.3 °F (36.8 °C)   TempSrc: Oral   SpO2: 95%   Weight: 163 lb 8 oz (74.2 kg)   Height: 5' 10\" (1.778 m)   PainSc:   0 - No pain          Learning Assessment:   :       Learning Assessment 2017   PRIMARY LEARNER Patient Patient Patient   HIGHEST LEVEL OF EDUCATION - PRIMARY LEARNER  - - DID NOT GRADUATE HIGH SCHOOL   BARRIERS PRIMARY LEARNER - - NONE   CO-LEARNER CAREGIVER - - No   PRIMARY LANGUAGE ENGLISH ENGLISH ENGLISH   LEARNER PREFERENCE PRIMARY DEMONSTRATION DEMONSTRATION READING   ANSWERED BY patient patient self   RELATIONSHIP SELF SELF SELF        Depression Screening:   :       3 most recent PHQ Screens 2022   Little interest or pleasure in doing things Not at all   Feeling down, depressed, irritable, or hopeless Not at all   Total Score PHQ 2 0        Fall Risk Assessment:   :       Fall Risk Assessment, last 12 mths 8/23/2021   Able to walk? Yes   Fall in past 12 months? 0   Do you feel unsteady? 0   Are you worried about falling 0   Number of falls in past 12 months -   Fall with injury? -        Abuse Screening:   :       Abuse Screening Questionnaire 8/23/2021 8/17/2020 8/26/2019 10/15/2018 10/24/2017 10/11/2016 9/10/2015   Do you ever feel afraid of your partner? N N N N N N N   Are you in a relationship with someone who physically or mentally threatens you? N N N N N N N   Is it safe for you to go home?  Y Y Y Y Y Y Y        ADL Screening:   :       ADL Assessment 8/23/2021   Feeding yourself No Help Needed   Getting from bed to chair No Help Needed   Getting dressed No Help Needed   Bathing or showering No Help Needed   Walk across the room (includes cane/walker) No Help Needed   Using the telphone No Help Needed   Taking your medications No Help Needed   Preparing meals No Help Needed   Managing money (expenses/bills) No Help Needed   Moderately strenuous housework (laundry) No Help Needed   Shopping for personal items (toiletries/medicines) No Help Needed   Shopping for groceries No Help Needed   Driving No Help Needed   Climbing a flight of stairs No Help Needed   Getting to places beyond walking distances No Help Needed

## 2022-03-18 PROBLEM — I65.23 BILATERAL CAROTID ARTERY STENOSIS: Status: ACTIVE | Noted: 2020-01-02

## 2022-03-19 PROBLEM — Z86.73 HISTORY OF TIA (TRANSIENT ISCHEMIC ATTACK): Status: ACTIVE | Noted: 2020-01-02

## 2022-03-19 PROBLEM — R73.03 PREDIABETES: Status: ACTIVE | Noted: 2020-02-17

## 2022-03-19 PROBLEM — E53.8 B12 DEFICIENCY: Status: ACTIVE | Noted: 2020-02-17

## 2022-03-19 PROBLEM — R41.3 DISTURBANCE OF MEMORY: Status: ACTIVE | Noted: 2020-01-02

## 2022-04-20 RX ORDER — LISINOPRIL 20 MG/1
20 TABLET ORAL DAILY
Qty: 90 TABLET | Refills: 3 | Status: SHIPPED | OUTPATIENT
Start: 2022-04-20

## 2022-04-20 NOTE — TELEPHONE ENCOUNTER
Requested Prescriptions     Pending Prescriptions Disp Refills    lisinopriL (PRINIVIL, ZESTRIL) 20 mg tablet 90 Tablet 3     Si Tablet daily.

## 2022-04-20 NOTE — TELEPHONE ENCOUNTER
PCP: Isidro Bang MD     Last appt: 2/23/2022     Future Appointments   Date Time Provider Bertrand Martini   8/23/2022  7:50 AM Isidro Bang MD [facility] AMB          Requested Prescriptions     Pending Prescriptions Disp Refills    lisinopriL (PRINIVIL, ZESTRIL) 20 mg tablet 90 Tablet 3     Sig: Take 1 Tablet by mouth daily.

## 2022-08-26 PROBLEM — C44.310 BASAL CELL CARCINOMA (BCC) OF SKIN OF FACE: Status: ACTIVE | Noted: 2022-08-26

## 2023-01-05 DIAGNOSIS — G45.1 TRANSIENT ISCHEMIC ATTACK INVOLVING RIGHT INTERNAL CAROTID ARTERY: ICD-10-CM

## 2023-01-05 RX ORDER — CLOPIDOGREL BISULFATE 75 MG/1
TABLET ORAL
Qty: 90 TABLET | Refills: 3 | Status: SHIPPED | OUTPATIENT
Start: 2023-01-05

## 2023-02-09 DIAGNOSIS — E78.00 HYPERCHOLESTEROLEMIA: ICD-10-CM

## 2023-02-09 RX ORDER — ATORVASTATIN CALCIUM 40 MG/1
TABLET, FILM COATED ORAL
Qty: 90 TABLET | Refills: 3 | Status: SHIPPED | OUTPATIENT
Start: 2023-02-09

## 2023-03-01 ENCOUNTER — OFFICE VISIT (OUTPATIENT)
Dept: INTERNAL MEDICINE CLINIC | Age: 78
End: 2023-03-01
Payer: MEDICARE

## 2023-03-01 VITALS
DIASTOLIC BLOOD PRESSURE: 89 MMHG | TEMPERATURE: 98.5 F | SYSTOLIC BLOOD PRESSURE: 134 MMHG | OXYGEN SATURATION: 96 % | RESPIRATION RATE: 16 BRPM | HEIGHT: 70 IN | WEIGHT: 164.6 LBS | HEART RATE: 69 BPM | BODY MASS INDEX: 23.56 KG/M2

## 2023-03-01 DIAGNOSIS — Z00.00 MEDICARE ANNUAL WELLNESS VISIT, SUBSEQUENT: Primary | ICD-10-CM

## 2023-03-01 DIAGNOSIS — Z13.31 SCREENING FOR DEPRESSION: ICD-10-CM

## 2023-03-01 DIAGNOSIS — I10 HYPERTENSION, ESSENTIAL: ICD-10-CM

## 2023-03-01 DIAGNOSIS — Z86.73 HISTORY OF TIA (TRANSIENT ISCHEMIC ATTACK): ICD-10-CM

## 2023-03-01 DIAGNOSIS — E78.00 HYPERCHOLESTEROLEMIA: ICD-10-CM

## 2023-03-01 DIAGNOSIS — J44.9 COPD, MODERATE (HCC): ICD-10-CM

## 2023-03-01 PROCEDURE — 3079F DIAST BP 80-89 MM HG: CPT | Performed by: INTERNAL MEDICINE

## 2023-03-01 PROCEDURE — 3075F SYST BP GE 130 - 139MM HG: CPT | Performed by: INTERNAL MEDICINE

## 2023-03-01 PROCEDURE — G8510 SCR DEP NEG, NO PLAN REQD: HCPCS | Performed by: INTERNAL MEDICINE

## 2023-03-01 PROCEDURE — G8420 CALC BMI NORM PARAMETERS: HCPCS | Performed by: INTERNAL MEDICINE

## 2023-03-01 PROCEDURE — G8427 DOCREV CUR MEDS BY ELIG CLIN: HCPCS | Performed by: INTERNAL MEDICINE

## 2023-03-01 PROCEDURE — 1101F PT FALLS ASSESS-DOCD LE1/YR: CPT | Performed by: INTERNAL MEDICINE

## 2023-03-01 PROCEDURE — G0439 PPPS, SUBSEQ VISIT: HCPCS | Performed by: INTERNAL MEDICINE

## 2023-03-01 PROCEDURE — G8536 NO DOC ELDER MAL SCRN: HCPCS | Performed by: INTERNAL MEDICINE

## 2023-03-01 PROCEDURE — 99214 OFFICE O/P EST MOD 30 MIN: CPT | Performed by: INTERNAL MEDICINE

## 2023-03-01 PROCEDURE — 1123F ACP DISCUSS/DSCN MKR DOCD: CPT | Performed by: INTERNAL MEDICINE

## 2023-03-01 NOTE — PATIENT INSTRUCTIONS
Medicare Wellness Visit, Male    The best way to live healthy is to have a lifestyle where you eat a well-balanced diet, exercise regularly, limit alcohol use, and quit all forms of tobacco/nicotine, if applicable. Regular preventive services are another way to keep healthy. Preventive services (vaccines, screening tests, monitoring & exams) can help personalize your care plan, which helps you manage your own care. Screening tests can find health problems at the earliest stages, when they are easiest to treat. [facility] follows the current, evidence-based guidelines published by the [facility] ([facility]) when recommending preventive services for our patients. Because we follow these guidelines, sometimes recommendations change over time as research supports it. (For example, a prostate screening blood test is no longer routinely recommended for men with no symptoms). Of course, you and your doctor may decide to screen more often for some diseases, based on your risk and co-morbidities (chronic disease you are already diagnosed with). Preventive services for you include:  - Medicare offers their members a free annual wellness visit, which is time for you and your primary care provider to discuss and plan for your preventive service needs.  Take advantage of this benefit every year!    -Over the age of 72 should receive the recommended pneumonia vaccines.   -All adults should have a flu vaccine yearly.  -All adults should receive a tetanus vaccine every 10 years.  -Over the age of 48 should receive the shingles vaccines.    -All adults should be screened once for Hepatitis C.  -All adults age 38-68 who are overweight should have a diabetes screening test once every three years.   -Other screening tests & preventive services for persons with diabetes include: an eye exam to screen for diabetic retinopathy, a kidney function test, a foot exam, and stricter control over your cholesterol.   -Cardiovascular screening for adults with routine risk involves an electrocardiogram (ECG) at intervals determined by the provider.     -Colorectal cancer screening should be done for adults age 43-69 with no increased risk factors for colorectal cancer. There are a number of acceptable methods of screening for this type of cancer. Each test has its own benefits and drawbacks. Discuss with your provider what is most appropriate for you during your annual wellness visit. The different tests include: colonoscopy (considered the best screening method), a fecal occult blood test, a fecal DNA test, and sigmoidoscopy.    -Lung cancer screening is recommended annually with a low dose CT scan for adults between age 54 and 68, who have smoked at least 30 pack years (equivalent of 1 pack per day for 30 days), and who is a current smoker or quit less than 15 years ago. -An Abdominal Aortic Aneurysm (AAA) Screening is recommended for men age 73-68 who has ever smoked in their lifetime.      Here is a list of your current Health Maintenance items (your personalized list of preventive services) with a due date:  Health Maintenance Due   Topic Date Due    Depresssion Screening  01/06/2023    Cholesterol Test   02/14/2023

## 2023-03-01 NOTE — PROGRESS NOTES
CC:   Chief Complaint   Patient presents with    Annual Wellness Visit       HISTORY OF PRESENT ILLNESS  Huber Allan is a 68 y.o. male. Presents for Medicare AWV and 6 month follow up evaluation. No complaints. Walks for 30 mins most days of the week. Denies HA's, CP, heart palpitations, SOB, wheezing, or leg swelling. Plays guDroid system master, landscapes for model trains, and goes on sailboat or camping as hobbies. Patient Active Problem List   Diagnosis Code    Hypercholesterolemia E78.00    Hypertension, essential I10    S/P colonoscopy Z98.890    ED (erectile dysfunction) N52.9    Actinic keratosis L57.0    COPD, moderate (HCC) J44.9    Bilateral carotid artery stenosis I65.23    History of TIA (transient ischemic attack) Z86.73    Disturbance of memory R41.3    Prediabetes R73.03    B12 deficiency E53.8    Basal cell carcinoma (BCC) of skin of face C44.310     Past Medical History:   Diagnosis Date    Arthritis     knee    Cancer ([facility] [address]     skin    Chronic obstructive pulmonary disease (HCC)     Hypercholesterolemia     Hypertension     Smoking 1/22/2010    Stopped 04/2000      Allergies   Allergen Reactions    Macrobid [Nitrofurantoin Monohyd/M-Cryst] Unknown (comments)    Tetracycline Unknown (comments)       Current Outpatient Medications   Medication Sig Dispense Refill    atorvastatin (LIPITOR) 40 mg tablet TAKE 1 TABLET NIGHTLY 90 Tablet 3    clopidogreL (PLAVIX) 75 mg tab TAKE 1 TABLET DAILY 90 Tablet 3    lisinopriL (PRINIVIL, ZESTRIL) 20 mg tablet Take 1 Tablet by mouth daily. 90 Tablet 3    albuterol (PROVENTIL HFA, VENTOLIN HFA, PROAIR HFA) 90 mcg/actuation inhaler Take 2 Puffs by inhalation every four (4) hours as needed for Wheezing. Indications: chronic obstructive pulmonary disease 3 Each 3    tadalafiL (CIALIS) 5 mg tablet TAKE 1 TABLET BY MOUTH EVERY DAY (Patient taking differently: Prn) 30 Tablet 3    cyanocobalamin 1,000 mcg tablet Take 1,000 mcg by mouth daily.            PHYSICAL EXAM  Visit Vitals  /89 (BP 1 Location: Left upper arm, BP Patient Position: Sitting, BP Cuff Size: Adult)   Pulse 69   Temp 98.5 °F (36.9 °C) (Oral)   Resp 16   Ht 5' 10\" (1.778 m)   Wt 164 lb 9.6 oz (74.7 kg)   SpO2 96%   BMI 23.62 kg/m²       General: Well-developed and well-nourished, no distress. HEENT:  Head normocephalic/atraumatic, no scleral icterus  Lungs:  Clear to ausculation bilaterally. Good air movement. Heart:  Regular rate and rhythm, normal S1 and S2, no murmur, gallop, or rub  Extremities: No clubbing, cyanosis, or edema. Neurological: Alert and oriented. Psychiatric: Normal mood and affect. Behavior is normal.         ASSESSMENT AND PLAN    ICD-10-CM ICD-9-CM    1. Medicare annual wellness visit, subsequent  Z00.00 V70.0       2. Hypertension, essential  I10 401.9       3. Hypercholesterolemia  E78.00 272.0       4. COPD, moderate (HCC)  J44.9 496       5. History of TIA (transient ischemic attack)  Z86.73 V12.54       6. Screening for depression  Z13.31 V79.0 DEPRESSION SCREEN ANNUAL        Diagnoses and all orders for this visit:    1. Medicare annual wellness visit, subsequent    2. Hypertension, essential    3. Hypercholesterolemia    4. COPD, moderate ([address]    5. History of TIA (transient ischemic attack)    6. Screening for depression  -     DEPRESSION SCREEN ANNUAL    HTN controlled on lisinopril 20 mg daily. Lipids controlled on 2/14/22: tot chol 135, LDL 70. Continue atorvastatin 20 mg nightly. Uses albuterol prn COPD. Follow-up and Dispositions    Return if symptoms worsen or fail to improve, for Scheduled appointment on 6/5/23; have fasting labs 1 week before appointment. I have discussed the diagnosis with the patient and the intended plan as seen in the above orders. Patient is in agreement. The patient has received an after-visit summary and questions were answered concerning future plans.   I have discussed medication side effects and warnings with the patient as well.

## 2023-03-01 NOTE — ACP (ADVANCE CARE PLANNING)
Advance Care Planning     Advance Care Planning (ACP) Physician/NP/PA Conversation      Date of Conversation: 3/1/2023  Conducted with: Patient with Decision Making Capacity    Healthcare Decision Maker:     Primary Decision Maker: Enrique Case - Spouse - [phone]    Secondary Decision Maker: Everton Olivia - Daughter    Secondary Decision Maker: Topher Medina - Daughter    Secondary Decision Maker: Ludy Jackson - Son  Click here to complete [address] including selection of the Healthcare Decision Maker Relationship (ie \"Primary\")      Care Preferences:    Hospitalization: \"If your health worsens and it becomes clear that your chance of recovery is unlikely, what would be your preference regarding hospitalization? \"  The patient would prefer hospitalization. Ventilation: \"If you were unable to breathe on your own and your chance of recovery was unlikely, what would be your preference about the use of a ventilator (breathing machine) if it was available to you? \"   The patient would NOT desire the use of a ventilator. Resuscitation: \"In the event your heart stopped as a result of an underlying serious health condition, would you want attempts to be made to restart your heart, or would you prefer a natural death? \"   Yes, attempt to resuscitate.     Additional topics discussed: treatment goals    Conversation Outcomes / Follow-Up Plan:   ACP complete - no further action today  Reviewed DNR/DNI and patient elects Full Code (Attempt Resuscitation)     Length of Voluntary ACP Conversation in minutes:  <16 minutes (Non-Billable)    Volodymyr Montes MD

## 2023-03-01 NOTE — PROGRESS NOTES
Evonne Malcolm  Identified pt with two pt identifiers(name and ). No chief complaint on file. Reviewed record In preparation for visit and have obtained necessary documentation. 1. Have you been to the ER, urgent care clinic or hospitalized since your last visit? Patient first 2022 URI     2. Have you seen or consulted any other health care providers outside of the [address] [place] [address] since your last visit? Include any pap smears or colon screening. No    Vitals reviewed with provider.     Health Maintenance reviewed:     Health Maintenance Due   Topic    COVID-19 Vaccine (3 - Booster for Moderna series)    Flu Vaccine (1)    Medicare Yearly Exam     Depression Screen     Lipid Screen           Wt Readings from Last 3 Encounters:   23 164 lb 9.6 oz (74.7 kg)   22 163 lb 8 oz (74.2 kg)   21 161 lb (73 kg)        Temp Readings from Last 3 Encounters:   23 98.5 °F (36.9 °C) (Oral)   22 98.3 °F (36.8 °C) (Oral)   21 98 °F (36.7 °C) (Oral)        BP Readings from Last 3 Encounters:   23 134/89   22 101/70   21 124/79        Pulse Readings from Last 3 Encounters:   23 69   22 73   21 65        Vitals:    23 0846 23 0855   BP: (!) 135/91 134/89   Pulse: 69    Resp: 16    Temp: 98.5 °F (36.9 °C)    TempSrc: Oral    SpO2: 96%    Weight: 164 lb 9.6 oz (74.7 kg)    Height: 5' 10\" (1.778 m)    PainSc:   0 - No pain           Learning Assessment:   :       Learning Assessment 2017   PRIMARY LEARNER Patient Patient Patient   HIGHEST LEVEL OF EDUCATION - PRIMARY LEARNER  - - DID NOT GRADUATE HIGH SCHOOL   BARRIERS PRIMARY LEARNER - - NONE   CO-LEARNER CAREGIVER - - No   PRIMARY LANGUAGE ENGLISH ENGLISH ENGLISH   LEARNER PREFERENCE PRIMARY DEMONSTRATION DEMONSTRATION READING   ANSWERED BY patient patient self   RELATIONSHIP SELF SELF SELF        Depression Screening:   :       3 most recent PHQ Screens 3/1/2023   Little interest or pleasure in doing things Not at all   Feeling down, depressed, irritable, or hopeless Not at all   Total Score PHQ 2 0        Fall Risk Assessment:   :       Fall Risk Assessment, last 12 mths 3/1/2023   Able to walk? Yes   Fall in past 12 months? 0   Do you feel unsteady? 0   Are you worried about falling 0   Number of falls in past 12 months -   Fall with injury? -        Abuse Screening:   :       Abuse Screening Questionnaire 3/1/2023 8/23/2021 8/17/2020 8/26/2019 10/15/2018 10/24/2017 10/11/2016   Do you ever feel afraid of your partner? N N N N N N N   Are you in a relationship with someone who physically or mentally threatens you? N N N N N N N   Is it safe for you to go home?  Y Y Y Y Y Y Y        ADL Screening:   :       ADL Assessment 3/1/2023   Feeding yourself No Help Needed   Getting from bed to chair No Help Needed   Getting dressed No Help Needed   Bathing or showering No Help Needed   Walk across the room (includes cane/walker) No Help Needed   Using the telphone No Help Needed   Taking your medications No Help Needed   Preparing meals No Help Needed   Managing money (expenses/bills) No Help Needed   Moderately strenuous housework (laundry) No Help Needed   Shopping for personal items (toiletries/medicines) No Help Needed   Shopping for groceries No Help Needed   Driving No Help Needed   Climbing a flight of stairs No Help Needed   Getting to places beyond walking distances No Help Needed

## 2023-03-01 NOTE — PROGRESS NOTES
This is the Subsequent Medicare Annual Wellness Exam, performed 12 months or more after the Initial AWV or the last Subsequent AWV    I have reviewed the patient's medical history in detail and updated the computerized patient record. Assessment/Plan   Education and counseling provided:  Are appropriate based on today's review and evaluation  Influenza Vaccine  COVID Vaccine    1. Medicare annual wellness visit, subsequent  2. Hypertension, essential  3. Hypercholesterolemia  4. COPD, moderate ([address]  5. History of TIA (transient ischemic attack)  6. Screening for depression  -     DEPRESSION SCREEN ANNUAL       Depression Risk Factor Screening     3 most recent PHQ Screens 3/1/2023   Little interest or pleasure in doing things Not at all   Feeling down, depressed, irritable, or hopeless Not at all   Total Score PHQ 2 0       Alcohol & Drug Abuse Risk Screen    Do you average more than 1 drink per night or more than 7 drinks a week: No    In the past three months have you have had more than 4 drinks containing alcohol on one occasion: No          Functional Ability and Level of Safety    Hearing: The patient needs further evaluation. Activities of Daily Living: The home contains: handrails  Patient does total self care      Ambulation: wheelchair bound     Fall Risk:  Fall Risk Assessment, last 12 mths 3/1/2023   Able to walk? Yes   Fall in past 12 months? 0   Do you feel unsteady? 0   Are you worried about falling 0   Number of falls in past 12 months -   Fall with injury?  -      Abuse Screen:  Patient is not abused       Cognitive Screening    Has your family/caregiver stated any concerns about your memory: no     Cognitive Screening: Normal recall of 3 of 3 words after 5 mins    Health Maintenance Due     Health Maintenance Due   Topic Date Due    COVID-19 Vaccine (3 - Booster for Moderna series) 08/20/2021    Flu Vaccine (1) 08/01/2022    Depression Screen  01/06/2023    Lipid Screen  02/14/2023 Patient Care Team   Patient Care Team:  Pipo Manzano MD as PCP - General (Internal Medicine Physician)  Pipo Manzano MD as PCP - [address] [place] [address]  [facility] Provider  Francine Sandhu (Inactive) (Dermatology Physician)  Marisabel Husain MD (Ophthalmology)    History     Patient Active Problem List   Diagnosis Code    Hypercholesterolemia E78.00    Hypertension, essential I10    S/P colonoscopy U62.266    ED (erectile dysfunction) N52.9    Actinic keratosis L57.0    COPD, moderate ([address] J44.9    Bilateral carotid artery stenosis I65.23    History of TIA (transient ischemic attack) Z86.73    Disturbance of memory R41.3    Prediabetes R73.03    B12 deficiency E53.8    Basal cell carcinoma (BCC) of skin of face C44.310     Past Medical History:   Diagnosis Date    Arthritis     knee    Cancer ([facility] [address]     skin    Chronic obstructive pulmonary disease ([facility] [address]     Hypercholesterolemia     Hypertension     Smoking 1/22/2010    Stopped 04/2000       Past Surgical History:   Procedure Laterality Date    COLORECTAL SCRN; HI RISK IND  01/12/2016    Dr. Arana Session. Diverticulosis, int hemorrhoids. Repeat in 5 yrs. ENDOSCOPY, COLON, DIAGNOSTIC  07/16/2007    Dr Arana Session polyp repeat 07/2010    HX ORTHOPAEDIC Right 3/2016    arthroscopic; torn medial meniscus     Current Outpatient Medications   Medication Sig Dispense Refill    atorvastatin (LIPITOR) 40 mg tablet TAKE 1 TABLET NIGHTLY 90 Tablet 3    clopidogreL (PLAVIX) 75 mg tab TAKE 1 TABLET DAILY 90 Tablet 3    lisinopriL (PRINIVIL, ZESTRIL) 20 mg tablet Take 1 Tablet by mouth daily. 90 Tablet 3    albuterol (PROVENTIL HFA, VENTOLIN HFA, PROAIR HFA) 90 mcg/actuation inhaler Take 2 Puffs by inhalation every four (4) hours as needed for Wheezing. Indications: chronic obstructive pulmonary disease 3 Each 3    tadalafiL (CIALIS) 5 mg tablet TAKE 1 TABLET BY MOUTH EVERY DAY (Patient taking differently: Prn) 30 Tablet 3    cyanocobalamin 1,000 mcg tablet Take 1,000 mcg by mouth daily. Allergies   Allergen Reactions    Macrobid [Nitrofurantoin Monohyd/M-Cryst] Unknown (comments)    Tetracycline Unknown (comments)       Family History   Problem Relation Age of Onset    Hypertension Mother     Thyroid Disease Mother     Stroke Mother     Kidney Disease Father     Cancer Father         skin    Diabetes Father     Neuropathy Brother     Cancer Paternal Aunt         lung    Cancer Paternal Uncle         skin    Diabetes Maternal Grandmother     Hypertension Child      Social History     Tobacco Use    Smoking status: Former     Packs/day: 2.00     Years: 40.00     Pack years: 80.00     Types: Cigarettes     Quit date: 2000     Years since quittin.9    Smokeless tobacco: Never   Substance Use Topics    Alcohol use: Not Currently         Nita Recinos MD

## 2023-04-22 DIAGNOSIS — R73.03 PREDIABETES: Primary | ICD-10-CM

## 2023-04-24 DIAGNOSIS — E78.00 HYPERCHOLESTEROLEMIA: Primary | ICD-10-CM

## 2023-04-24 DIAGNOSIS — E53.8 B12 DEFICIENCY: Primary | ICD-10-CM

## 2023-05-22 LAB
BASOPHILS # BLD AUTO: 0.1 X10E3/UL (ref 0–0.2)
BASOPHILS NFR BLD AUTO: 1 %
EOSINOPHIL # BLD AUTO: 0.2 X10E3/UL (ref 0–0.4)
EOSINOPHIL NFR BLD AUTO: 4 %
ERYTHROCYTE [DISTWIDTH] IN BLOOD BY AUTOMATED COUNT: 12.8 % (ref 11.6–15.4)
HCT VFR BLD AUTO: 44.5 % (ref 37.5–51)
HGB BLD-MCNC: 14.8 G/DL (ref 13–17.7)
IMM GRANULOCYTES # BLD AUTO: 0 X10E3/UL (ref 0–0.1)
IMM GRANULOCYTES NFR BLD AUTO: 0 %
LYMPHOCYTES # BLD AUTO: 1.5 X10E3/UL (ref 0.7–3.1)
LYMPHOCYTES NFR BLD AUTO: 25 %
MCH RBC QN AUTO: 30.6 PG (ref 26.6–33)
MCHC RBC AUTO-ENTMCNC: 33.3 G/DL (ref 31.5–35.7)
MCV RBC AUTO: 92 FL (ref 79–97)
MONOCYTES # BLD AUTO: 0.5 X10E3/UL (ref 0.1–0.9)
MONOCYTES NFR BLD AUTO: 9 %
NEUTROPHILS # BLD AUTO: 3.6 X10E3/UL (ref 1.4–7)
NEUTROPHILS NFR BLD AUTO: 61 %
PLATELET # BLD AUTO: 252 X10E3/UL (ref 150–450)
RBC # BLD AUTO: 4.84 X10E6/UL (ref 4.14–5.8)
WBC # BLD AUTO: 5.8 X10E3/UL (ref 3.4–10.8)

## 2023-05-23 LAB
ALBUMIN SERPL-MCNC: 4.3 G/DL (ref 3.7–4.7)
ALBUMIN/GLOB SERPL: 1.8 {RATIO} (ref 1.2–2.2)
ALP SERPL-CCNC: 134 IU/L (ref 44–121)
ALT SERPL-CCNC: 16 IU/L (ref 0–44)
AST SERPL-CCNC: 23 IU/L (ref 0–40)
BILIRUB SERPL-MCNC: 0.5 MG/DL (ref 0–1.2)
BUN SERPL-MCNC: 14 MG/DL (ref 8–27)
BUN/CREAT SERPL: 13 (ref 10–24)
CALCIUM SERPL-MCNC: 9.9 MG/DL (ref 8.6–10.2)
CHLORIDE SERPL-SCNC: 104 MMOL/L (ref 96–106)
CHOLEST SERPL-MCNC: 134 MG/DL (ref 100–199)
CO2 SERPL-SCNC: 24 MMOL/L (ref 20–29)
CREAT SERPL-MCNC: 1.08 MG/DL (ref 0.76–1.27)
EGFRCR SERPLBLD CKD-EPI 2021: 70 ML/MIN/1.73
EST. AVERAGE GLUCOSE BLD GHB EST-MCNC: 128 MG/DL
GLOBULIN SER CALC-MCNC: 2.4 G/DL (ref 1.5–4.5)
GLUCOSE SERPL-MCNC: 95 MG/DL (ref 70–99)
HBA1C MFR BLD: 6.1 % (ref 4.8–5.6)
HDLC SERPL-MCNC: 52 MG/DL
LDLC SERPL CALC-MCNC: 69 MG/DL (ref 0–99)
POTASSIUM SERPL-SCNC: 4.5 MMOL/L (ref 3.5–5.2)
PROT SERPL-MCNC: 6.7 G/DL (ref 6–8.5)
SODIUM SERPL-SCNC: 142 MMOL/L (ref 134–144)
TRIGL SERPL-MCNC: 61 MG/DL (ref 0–149)
VIT B12 SERPL-MCNC: 680 PG/ML (ref 232–1245)
VLDLC SERPL CALC-MCNC: 13 MG/DL (ref 5–40)

## 2023-06-05 ENCOUNTER — OFFICE VISIT (OUTPATIENT)
Facility: CLINIC | Age: 78
End: 2023-06-05
Payer: MEDICARE

## 2023-06-05 VITALS
OXYGEN SATURATION: 96 % | DIASTOLIC BLOOD PRESSURE: 76 MMHG | HEART RATE: 66 BPM | RESPIRATION RATE: 18 BRPM | BODY MASS INDEX: 23.65 KG/M2 | HEIGHT: 70 IN | WEIGHT: 165.2 LBS | SYSTOLIC BLOOD PRESSURE: 113 MMHG | TEMPERATURE: 98.7 F

## 2023-06-05 DIAGNOSIS — Z86.73 HISTORY OF TIA (TRANSIENT ISCHEMIC ATTACK): ICD-10-CM

## 2023-06-05 DIAGNOSIS — E78.00 HYPERCHOLESTEROLEMIA: ICD-10-CM

## 2023-06-05 DIAGNOSIS — R73.03 PREDIABETES: ICD-10-CM

## 2023-06-05 DIAGNOSIS — Z12.11 SCREENING FOR COLON CANCER: ICD-10-CM

## 2023-06-05 DIAGNOSIS — J44.9 COPD, MODERATE (HCC): ICD-10-CM

## 2023-06-05 DIAGNOSIS — I10 HYPERTENSION, ESSENTIAL: Primary | ICD-10-CM

## 2023-06-05 PROBLEM — I65.23 BILATERAL CAROTID ARTERY STENOSIS: Status: ACTIVE | Noted: 2020-01-02

## 2023-06-05 PROCEDURE — 1036F TOBACCO NON-USER: CPT | Performed by: INTERNAL MEDICINE

## 2023-06-05 PROCEDURE — 99214 OFFICE O/P EST MOD 30 MIN: CPT | Performed by: INTERNAL MEDICINE

## 2023-06-05 PROCEDURE — G8420 CALC BMI NORM PARAMETERS: HCPCS | Performed by: INTERNAL MEDICINE

## 2023-06-05 PROCEDURE — 3074F SYST BP LT 130 MM HG: CPT | Performed by: INTERNAL MEDICINE

## 2023-06-05 PROCEDURE — 1123F ACP DISCUSS/DSCN MKR DOCD: CPT | Performed by: INTERNAL MEDICINE

## 2023-06-05 PROCEDURE — 3078F DIAST BP <80 MM HG: CPT | Performed by: INTERNAL MEDICINE

## 2023-06-05 PROCEDURE — 3023F SPIROM DOC REV: CPT | Performed by: INTERNAL MEDICINE

## 2023-06-05 PROCEDURE — G8427 DOCREV CUR MEDS BY ELIG CLIN: HCPCS | Performed by: INTERNAL MEDICINE

## 2023-06-05 SDOH — ECONOMIC STABILITY: INCOME INSECURITY: HOW HARD IS IT FOR YOU TO PAY FOR THE VERY BASICS LIKE FOOD, HOUSING, MEDICAL CARE, AND HEATING?: NOT HARD AT ALL

## 2023-06-05 SDOH — ECONOMIC STABILITY: FOOD INSECURITY: WITHIN THE PAST 12 MONTHS, YOU WORRIED THAT YOUR FOOD WOULD RUN OUT BEFORE YOU GOT MONEY TO BUY MORE.: NEVER TRUE

## 2023-06-05 SDOH — ECONOMIC STABILITY: FOOD INSECURITY: WITHIN THE PAST 12 MONTHS, THE FOOD YOU BOUGHT JUST DIDN'T LAST AND YOU DIDN'T HAVE MONEY TO GET MORE.: NEVER TRUE

## 2023-06-05 SDOH — ECONOMIC STABILITY: HOUSING INSECURITY
IN THE LAST 12 MONTHS, WAS THERE A TIME WHEN YOU DID NOT HAVE A STEADY PLACE TO SLEEP OR SLEPT IN A SHELTER (INCLUDING NOW)?: NO

## 2023-06-05 ASSESSMENT — PATIENT HEALTH QUESTIONNAIRE - PHQ9
SUM OF ALL RESPONSES TO PHQ QUESTIONS 1-9: 0
SUM OF ALL RESPONSES TO PHQ QUESTIONS 1-9: 0
SUM OF ALL RESPONSES TO PHQ9 QUESTIONS 1 & 2: 0
2. FEELING DOWN, DEPRESSED OR HOPELESS: 0
SUM OF ALL RESPONSES TO PHQ QUESTIONS 1-9: 0
1. LITTLE INTEREST OR PLEASURE IN DOING THINGS: 0
SUM OF ALL RESPONSES TO PHQ QUESTIONS 1-9: 0

## 2023-06-05 NOTE — PROGRESS NOTES
Giana Bertrandmorganpaddy  Identified pt with two pt identifiers(name and ). Chief Complaint   Patient presents with    Hypertension    Cholesterol Problem    Blood Sugar Problem       Reviewed record In preparation for visit and have obtained necessary documentation. 1. Have you been to the ER, urgent care clinic or hospitalized since your last visit? No     2. Have you seen or consulted any other health care providers outside of the [address] [place] [address] since your last visit? Include any pap smears or colon screening. No    Vitals reviewed with provider. Health Maintenance reviewed:     Health Maintenance Due   Topic    Shingles vaccine (2 of 3)    COVID-19 Vaccine (3 - Booster for Moderna series)          Wt Readings from Last 3 Encounters:   23 165 lb 3.2 oz (74.9 kg)   23 164 lb 9.6 oz (74.7 kg)   22 163 lb 8 oz (74.2 kg)        Temp Readings from Last 3 Encounters:   23 98.7 °F (37.1 °C) (Oral)        BP Readings from Last 3 Encounters:   23 113/76   23 134/89   22 101/70        Pulse Readings from Last 3 Encounters:   23 66   23 69   22 73      No flowsheet data found. Learning Assessment:   :     [facility] LEARNING ASSESSMENT 2023   PRIMARY LEARNER Patient   HIGHEST LEVEL OF EDUCATION - PRIMARY LEARNER GRADUATED HIGH SCHOOL OR GED   PRIMARY LANGUAGE ENGLISH   LEARNER PREFERENCE PRIMARY LISTENING     READING   ANSWERED BY patient   RELATIONSHIP SELF         Abuse Assessment:    [facility] Abuse Screening 2023   Do you ever feel afraid of your partner? N   Are you in a relationship with someone who physically or mentally threatens you? N   Is it safe for you to go home? Y         Fall Risk Assessment:   :     ,  [facility] Fall Risk Assessment and TUG Test 3/1/2023 2021 2021   Fall in past 12 months? 0 0 0   Able to walk?  Yes Yes Yes          ADL Assessment:    ADL ASSESSMENT 2023   Feeding yourself No Help Needed   Getting from
tablet 1 tablet as needed       No current facility-administered medications for this visit. PHYSICAL EXAM  /76 (Site: Left Upper Arm, Position: Sitting, Cuff Size: Medium Adult)   Pulse 66   Temp 98.7 °F (37.1 °C) (Oral)   Resp 18   Ht 5' 10\" (1.778 m)   Wt 165 lb 3.2 oz (74.9 kg)   SpO2 96%   BMI 23.70 kg/m²     General: Well-developed and well-nourished, no distress. HEENT:  Head normocephalic/atraumatic, no scleral icterus  Neck: Supple. No carotid bruits, JVD, lymphadenopathy, or thyromegaly. Lungs:  Clear to ausculation bilaterally. Good air movement. Heart:  Regular rate and rhythm, normal S1 and S2, no murmur, gallop, or rub  Extremities: No clubbing, cyanosis, or edema. 2+ pedal pulses bilaterally. Neurological: Alert and oriented. No focal deficits. Psychiatric: Normal mood and affect. Behavior is normal.        Lab Results   Component Value Date    WBC 5.8 05/22/2023    HGB 14.8 05/22/2023    HCT 44.5 05/22/2023     05/22/2023    CHOL 134 05/22/2023    TRIG 61 05/22/2023    HDL 52 05/22/2023    ALT 16 05/22/2023    AST 23 05/22/2023     05/22/2023    K 4.5 05/22/2023     05/22/2023    CREATININE 1.08 05/22/2023    BUN 14 05/22/2023    CO2 24 05/22/2023    TSH 1.690 02/08/2021    LABA1C 6.1 (H) 05/22/2023          ASSESSMENT AND PLAN      ICD-10-CM    1. Hypertension, essential  I10       2. Hypercholesterolemia  E78.00       3. Prediabetes  R73.03       4. COPD, moderate ([address]  J44.9       5. History of TIA (transient ischemic attack)  Z86.73       6. Screening for colon cancer  Z12.11 SANFORD Bustamante MD, Gastroenterology, [address] [facility] [address]         Discussed lab results from 5/22/23 with patient. Prediabetes with A1c 6.1%, was 6.2% a yr ago. Normal kidney and liver tests, blood counts, cholesterol (tot chol 134, LDL 69), and vitamin B12.    - HTN well-controlled.  Continue lisinopril 20 mg daily.  - I informed him that lisinopril could be the cause of his

## 2023-07-12 RX ORDER — LISINOPRIL 20 MG/1
TABLET ORAL
Qty: 90 TABLET | Refills: 3 | Status: SHIPPED | OUTPATIENT
Start: 2023-07-12

## 2023-07-12 NOTE — TELEPHONE ENCOUNTER
PCP: Darryn Reyes MD     Last appt:  6/5/2023      Future Appointments   Date Time Provider [facility] [address]   12/5/2023  8:30 AM Darryn Reyes MD [facility] AMB          Requested Prescriptions     Pending Prescriptions Disp Refills    lisinopril (PRINIVIL;ZESTRIL) 20 MG tablet [Pharmacy Med Name: LISINOPRIL TABS 20MG] 90 tablet 3     Sig: TAKE 1 TABLET DAILY

## 2023-07-15 NOTE — PROGRESS NOTES
HISTORY OF PRESENT ILLNESS  Prince Villeda is a 68 y.o. male. HPI  He complains of 2 days of productive cough and wheezing which are worse in AM.. Associated symptoms include shortness of breath. He denies achiness, congestion, headache, sore throat, fever, chills, or night sweats. Clinical course has been unchanged since that time. He has tried no treatment. Patient is non-smoker    He presents for follow up/complaint of pain in neck of 7 days duration. Was lifting trailer hitch and felt pain in in neck. He went to [facility] after that. He was prescribed Robaxin which has not really helped. He has also been using heat, but that too seemed not to help and he stopped after 5 days. Quality is burning. Intensity at worst is  6/10 and at least is 2/10. Pain is Constant. Pain is worse at no particular time or movement. Pain is alleviated by nothing Tried heat and muscle relaxers without improvement. Over time pain is the same.        3 most recent PHQ Screens 3/5/2019   Little interest or pleasure in doing things Not at all   Feeling down, depressed, irritable, or hopeless Not at all   Total Score PHQ 2 0     Patient Active Problem List   Diagnosis Code    Hypercholesterolemia E78.00    Hypertension, essential I10    S/P colonoscopy B16.899    ED (erectile dysfunction) N52.9    Actinic keratosis L57.0    COPD, moderate ([address] J44.9     Past Medical History:   Diagnosis Date    Arthritis     knee    Cancer ([address]     skin    Chronic obstructive pulmonary disease ([address]     Hypercholesterolemia     Hypertension     Ill-defined condition     lipids    Smoking 1/22/2010    Stopped 04/2000      Past Surgical History:   Procedure Laterality Date    COLORECTAL SCRN; HI RISK IND  1/12/2016         ENDOSCOPY, COLON, DIAGNOSTIC  07/16/2007    Dr Quezada Plant polyp repeat 07/2010    HX ORTHOPAEDIC Right 3/2016    arthroscopic; torn medial meniscus     Social History     Socioeconomic History    Marital status:      Spouse name: Not on file    Number of children: 3    Years of education: Not on file    Highest education level: Not on file   Occupational History    Occupation: construction   Tobacco Use    Smoking status: Former Smoker     Packs/day: 2.00     Years: 40.00     Pack years: 80.00     Last attempt to quit: 2000     Years since quittin.9    Smokeless tobacco: Never Used   Substance and Sexual Activity    Alcohol use: Yes     Alcohol/week: 0.0 oz     Types: 2 - 3 Cans of beer per week     Comment: Soc    Drug use: No     Family History   Problem Relation Age of Onset    Hypertension Mother     Thyroid Disease Mother     Kidney Disease Father     Cancer Father         skin    Diabetes Father     Cancer Paternal Aunt         lung    Cancer Paternal Uncle         skin     Allergies   Allergen Reactions    Macrobid [Nitrofurantoin Monohyd/M-Cryst] Unknown (comments)    Tetracycline Unknown (comments)     Current Outpatient Medications   Medication Sig Dispense Refill    methocarbamol (ROBAXIN) 500 mg tablet TAKE 2 TABLETS BY MOUTH 4 TIMES DAILY AS NEEDED FOR MUSLCE SPASMS  0    lisinopril (PRINIVIL, ZESTRIL) 20 mg tablet TAKE 1 TABLET DAILY 90 Tab 3    pravastatin (PRAVACHOL) 40 mg tablet TAKE 1 TABLET DAILY 90 Tab 3    tadalafil (CIALIS) 5 mg tablet TAKE 1 TABLET daily 30 Tab 5    aspirin 81 mg chewable tablet Take 1 Tab by mouth daily. 30 Tab 11    albuterol (PROVENTIL HFA, VENTOLIN HFA, PROAIR HFA) 90 mcg/actuation inhaler Take 2 Puffs by inhalation every four (4) hours as needed for Wheezing. 1 Inhaler 0       ROS     Visit Vitals  /80   Pulse 67   Temp 97.6 °F (36.4 °C) (Oral)   Resp 16   Ht 5' 10\" (1.778 m)   Wt 171 lb (77.6 kg)   SpO2 96%   BMI 24.54 kg/m²     Physical Exam   Constitutional: He is oriented to person, place, and time. He appears well-developed and well-nourished. HENT:   Head: Normocephalic and atraumatic.    Right Ear: Tympanic membrane and ear canal normal.   Left Ear: Tympanic membrane and ear canal normal.   Nose: No mucosal edema. Mouth/Throat: Oropharynx is clear and moist and mucous membranes are normal. Mucous membranes are not pale and not dry. No oropharyngeal exudate, posterior oropharyngeal edema or posterior oropharyngeal erythema. Eyes: Conjunctivae are normal. Pupils are equal, round, and reactive to light. Right eye exhibits no discharge. Left eye exhibits no discharge. Neck: Neck supple. Cardiovascular: Normal rate, S1 normal, S2 normal and normal heart sounds. Exam reveals no gallop. No murmur heard. Pulmonary/Chest: Effort normal and breath sounds normal. He has no wheezes. He has no rhonchi. He has no rales. Musculoskeletal:        Right shoulder: He exhibits tenderness and pain (with rotation to the left). He exhibits normal range of motion, no bony tenderness, no spasm and normal strength. Cervical back: He exhibits tenderness and pain. He exhibits normal range of motion, no bony tenderness, no swelling and no spasm. Back:    Lymphadenopathy:     He has no cervical adenopathy. Neurological: He is alert and oriented to person, place, and time. Motor strength is 5/5 to finger flexion/extension, abduction and opposition, wrist flexion/extension, elbow flexion extension right and left. Skin: Skin is warm, dry and intact. No rash noted. Nursing note and vitals reviewed. ASSESSMENT and PLAN    ICD-10-CM ICD-9-CM    1. Acute bronchitis, unspecified organism J20.9 466.0    2. Neck sprain, initial encounter S13. 9XXA 847.0    3. Screening for depression Z13.31 V79.0 [place] DEPRESSION SCREEN ANNUAL     Diagnoses and all orders for this visit:    1. Acute bronchitis, unspecified organism  Encouraged use of albuterol he has at home. 2. Neck sprain, initial encounter  Reassured that this takes up to 6 weeks to resolve. Continue heat as needed.      3. Screening for depression-Negative  -     TX DEPRESSION SCREEN ANNUAL      Follow-up Disposition: Not on File   I have discussed the diagnosis, evaluation and treatment options and the intended plan with the patient. Patient understands and is in agreement. The patient has received an after-visit summary and questions were answered concerning future plans. I have discussed side effects and warnings of any new medications with the patient as well. Cheek Interpolation Flap Text: A decision was made to reconstruct the defect utilizing an interpolation axial flap and a staged reconstruction.  A telfa template was made of the defect.  This telfa template was then used to outline the Cheek Interpolation flap.  The donor area for the pedicle flap was then injected with anesthesia.  The flap was excised through the skin and subcutaneous tissue down to the layer of the underlying musculature.  The interpolation flap was carefully excised within this deep plane to maintain its blood supply.  The edges of the donor site were undermined.   The donor site was closed in a primary fashion.  The pedicle was then rotated into position and sutured.  Once the tube was sutured into place, adequate blood supply was confirmed with blanching and refill.  The pedicle was then wrapped with xeroform gauze and dressed appropriately with a telfa and gauze bandage to ensure continued blood supply and protect the attached pedicle.

## 2023-07-19 RX ORDER — TADALAFIL 5 MG/1
TABLET ORAL
Qty: 30 TABLET | Refills: 3 | Status: SHIPPED | OUTPATIENT
Start: 2023-07-19

## 2023-07-19 NOTE — TELEPHONE ENCOUNTER
PCP: Mando Joyner MD     Last appt:  6/5/2023      Future Appointments   Date Time Provider [address]00  [address]   12/5/2023  8:30 AM Mando Joyner MD [facility] BS AMB          Requested Prescriptions     Pending Prescriptions Disp Refills    tadalafil (CIALIS) 5 MG tablet [Pharmacy Med Name: TADALAFIL 5 MG TABLET] 30 tablet 3     Sig: TAKE 1 TABLET BY MOUTH EVERY DAY

## 2023-10-09 NOTE — ACP (ADVANCE CARE PLANNING)
Reviewed ACP note on file. It expresses his current wishes. Eye Clamp Note Details: An eye clamp was used during the procedure.

## 2023-12-05 ENCOUNTER — OFFICE VISIT (OUTPATIENT)
Facility: CLINIC | Age: 78
End: 2023-12-05
Payer: MEDICARE

## 2023-12-05 VITALS
OXYGEN SATURATION: 96 % | HEART RATE: 68 BPM | TEMPERATURE: 97.7 F | RESPIRATION RATE: 16 BRPM | DIASTOLIC BLOOD PRESSURE: 85 MMHG | BODY MASS INDEX: 23.68 KG/M2 | WEIGHT: 165.4 LBS | SYSTOLIC BLOOD PRESSURE: 128 MMHG | HEIGHT: 70 IN

## 2023-12-05 DIAGNOSIS — I10 HYPERTENSION, ESSENTIAL: Primary | ICD-10-CM

## 2023-12-05 DIAGNOSIS — E53.8 B12 DEFICIENCY: ICD-10-CM

## 2023-12-05 DIAGNOSIS — E78.00 HYPERCHOLESTEROLEMIA: ICD-10-CM

## 2023-12-05 DIAGNOSIS — R73.03 PREDIABETES: ICD-10-CM

## 2023-12-05 DIAGNOSIS — R05.3 CHRONIC COUGH: ICD-10-CM

## 2023-12-05 DIAGNOSIS — Z86.73 HISTORY OF TIA (TRANSIENT ISCHEMIC ATTACK): ICD-10-CM

## 2023-12-05 PROCEDURE — 1036F TOBACCO NON-USER: CPT | Performed by: INTERNAL MEDICINE

## 2023-12-05 PROCEDURE — 99214 OFFICE O/P EST MOD 30 MIN: CPT | Performed by: INTERNAL MEDICINE

## 2023-12-05 PROCEDURE — G8484 FLU IMMUNIZE NO ADMIN: HCPCS | Performed by: INTERNAL MEDICINE

## 2023-12-05 PROCEDURE — 3079F DIAST BP 80-89 MM HG: CPT | Performed by: INTERNAL MEDICINE

## 2023-12-05 PROCEDURE — 1123F ACP DISCUSS/DSCN MKR DOCD: CPT | Performed by: INTERNAL MEDICINE

## 2023-12-05 PROCEDURE — G8427 DOCREV CUR MEDS BY ELIG CLIN: HCPCS | Performed by: INTERNAL MEDICINE

## 2023-12-05 PROCEDURE — 3074F SYST BP LT 130 MM HG: CPT | Performed by: INTERNAL MEDICINE

## 2023-12-05 PROCEDURE — G8420 CALC BMI NORM PARAMETERS: HCPCS | Performed by: INTERNAL MEDICINE

## 2023-12-05 NOTE — PROGRESS NOTES
Chief Complaint   Patient presents with    Hypertension       HISTORY OF PRESENT ILLNESS  Aleshia Colon is a 66 y.o. male    Presents for 6 month follow up evaluation of HTN and chronic cough. [place] over a curb and scraped his right hand 2 days ago. Seen at an urgent care clinic in [facility]. Received Td vaccine. Denies headaches, CP, SOB, dizziness, heart palpitations, or leg swelling. Takes lisinopril 20 mg daily. Chronic cough has improved. Not daily. Occurs when stomach is empty and he has heartburn or when his allergies worsen in Jan or Feb.    Declined flu, COVID, shingles, and RSV vaccines.     Patient Active Problem List   Diagnosis    Hypercholesterolemia    ED (erectile dysfunction)    Bilateral carotid artery stenosis    Disturbance of memory    Hypertension, essential    COPD, moderate (HCC)    Prediabetes    B12 deficiency    History of TIA (transient ischemic attack)    Actinic keratosis    S/P colonoscopy    Basal cell carcinoma (BCC) of skin of face     Past Medical History:   Diagnosis Date    Arthritis     knee    Cancer ([address])     skin    Chronic obstructive pulmonary disease (HCC)     Hypercholesterolemia     Hypertension     Smoking 1/22/2010    Stopped 04/2000      Allergies   Allergen Reactions    Nitrofurantoin      Other reaction(s): Unknown (comments)    Tetracycline      Other reaction(s): Unknown (comments)       Current Outpatient Medications   Medication Sig Dispense Refill    tadalafil (CIALIS) 5 MG tablet TAKE 1 TABLET BY MOUTH EVERY DAY 30 tablet 3    lisinopril (PRINIVIL;ZESTRIL) 20 MG tablet TAKE 1 TABLET DAILY 90 tablet 3    albuterol sulfate HFA (PROVENTIL;VENTOLIN;PROAIR) 108 (90 Base) MCG/ACT inhaler Inhale 2 puffs into the lungs every 4 hours as needed      atorvastatin (LIPITOR) 40 MG tablet TAKE 1 TABLET NIGHTLY      clopidogrel (PLAVIX) 75 MG tablet TAKE 1 TABLET DAILY      cyanocobalamin 1000 MCG tablet Take by mouth daily       No current facility-administered

## 2024-01-01 DIAGNOSIS — I65.23 BILATERAL CAROTID ARTERY STENOSIS: Primary | ICD-10-CM

## 2024-01-02 RX ORDER — CLOPIDOGREL BISULFATE 75 MG/1
TABLET ORAL
Qty: 90 TABLET | Refills: 3 | Status: SHIPPED | OUTPATIENT
Start: 2024-01-02

## 2024-02-28 LAB
BASOPHILS # BLD AUTO: 0.1 X10E3/UL (ref 0–0.2)
BASOPHILS NFR BLD AUTO: 1 %
EOSINOPHIL # BLD AUTO: 0.3 X10E3/UL (ref 0–0.4)
EOSINOPHIL NFR BLD AUTO: 4 %
ERYTHROCYTE [DISTWIDTH] IN BLOOD BY AUTOMATED COUNT: 12.5 % (ref 11.6–15.4)
HCT VFR BLD AUTO: 44.4 % (ref 37.5–51)
HGB BLD-MCNC: 14.5 G/DL (ref 13–17.7)
IMM GRANULOCYTES # BLD AUTO: 0 X10E3/UL (ref 0–0.1)
IMM GRANULOCYTES NFR BLD AUTO: 0 %
LYMPHOCYTES # BLD AUTO: 1.6 X10E3/UL (ref 0.7–3.1)
LYMPHOCYTES NFR BLD AUTO: 19 %
MCH RBC QN AUTO: 29.8 PG (ref 26.6–33)
MCHC RBC AUTO-ENTMCNC: 32.7 G/DL (ref 31.5–35.7)
MCV RBC AUTO: 91 FL (ref 79–97)
MONOCYTES # BLD AUTO: 0.7 X10E3/UL (ref 0.1–0.9)
MONOCYTES NFR BLD AUTO: 8 %
NEUTROPHILS # BLD AUTO: 5.4 X10E3/UL (ref 1.4–7)
NEUTROPHILS NFR BLD AUTO: 68 %
PLATELET # BLD AUTO: 311 X10E3/UL (ref 150–450)
RBC # BLD AUTO: 4.87 X10E6/UL (ref 4.14–5.8)
WBC # BLD AUTO: 8.1 X10E3/UL (ref 3.4–10.8)

## 2024-02-29 LAB
ALBUMIN SERPL-MCNC: 4.2 G/DL (ref 3.8–4.8)
ALBUMIN/GLOB SERPL: 1.4 {RATIO} (ref 1.2–2.2)
ALP SERPL-CCNC: 144 IU/L (ref 44–121)
ALT SERPL-CCNC: 14 IU/L (ref 0–44)
AST SERPL-CCNC: 18 IU/L (ref 0–40)
BILIRUB SERPL-MCNC: 0.4 MG/DL (ref 0–1.2)
BUN SERPL-MCNC: 18 MG/DL (ref 8–27)
BUN/CREAT SERPL: 13 (ref 10–24)
CALCIUM SERPL-MCNC: 9.9 MG/DL (ref 8.6–10.2)
CHLORIDE SERPL-SCNC: 101 MMOL/L (ref 96–106)
CHOLEST SERPL-MCNC: 135 MG/DL (ref 100–199)
CO2 SERPL-SCNC: 24 MMOL/L (ref 20–29)
CREAT SERPL-MCNC: 1.38 MG/DL (ref 0.76–1.27)
EGFRCR SERPLBLD CKD-EPI 2021: 52 ML/MIN/1.73
FOLATE SERPL-MCNC: 5.5 NG/ML
GLOBULIN SER CALC-MCNC: 2.9 G/DL (ref 1.5–4.5)
GLUCOSE SERPL-MCNC: 91 MG/DL (ref 70–99)
HBA1C MFR BLD: 6.2 % (ref 4.8–5.6)
HDLC SERPL-MCNC: 48 MG/DL
LDLC SERPL CALC-MCNC: 72 MG/DL (ref 0–99)
POTASSIUM SERPL-SCNC: 5 MMOL/L (ref 3.5–5.2)
PROT SERPL-MCNC: 7.1 G/DL (ref 6–8.5)
SODIUM SERPL-SCNC: 138 MMOL/L (ref 134–144)
TRIGL SERPL-MCNC: 78 MG/DL (ref 0–149)
VIT B12 SERPL-MCNC: 806 PG/ML (ref 232–1245)
VLDLC SERPL CALC-MCNC: 15 MG/DL (ref 5–40)

## 2024-03-06 ENCOUNTER — OFFICE VISIT (OUTPATIENT)
Facility: CLINIC | Age: 79
End: 2024-03-06
Payer: MEDICARE

## 2024-03-06 VITALS
RESPIRATION RATE: 16 BRPM | BODY MASS INDEX: 23.77 KG/M2 | HEART RATE: 64 BPM | WEIGHT: 166 LBS | TEMPERATURE: 98.1 F | OXYGEN SATURATION: 97 % | SYSTOLIC BLOOD PRESSURE: 139 MMHG | HEIGHT: 70 IN | DIASTOLIC BLOOD PRESSURE: 84 MMHG

## 2024-03-06 DIAGNOSIS — M54.31 SCIATICA OF RIGHT SIDE: ICD-10-CM

## 2024-03-06 DIAGNOSIS — R73.03 PREDIABETES: ICD-10-CM

## 2024-03-06 DIAGNOSIS — H91.90 HEARING LOSS, UNSPECIFIED HEARING LOSS TYPE, UNSPECIFIED LATERALITY: ICD-10-CM

## 2024-03-06 DIAGNOSIS — Z00.00 MEDICARE ANNUAL WELLNESS VISIT, SUBSEQUENT: Primary | ICD-10-CM

## 2024-03-06 DIAGNOSIS — I10 HYPERTENSION, ESSENTIAL: ICD-10-CM

## 2024-03-06 DIAGNOSIS — E78.00 HYPERCHOLESTEROLEMIA: ICD-10-CM

## 2024-03-06 DIAGNOSIS — J44.9 COPD, MODERATE (HCC): ICD-10-CM

## 2024-03-06 DIAGNOSIS — Z86.73 HISTORY OF TIA (TRANSIENT ISCHEMIC ATTACK): ICD-10-CM

## 2024-03-06 DIAGNOSIS — N17.9 AKI (ACUTE KIDNEY INJURY) (HCC): ICD-10-CM

## 2024-03-06 PROCEDURE — G8420 CALC BMI NORM PARAMETERS: HCPCS | Performed by: INTERNAL MEDICINE

## 2024-03-06 PROCEDURE — 1123F ACP DISCUSS/DSCN MKR DOCD: CPT | Performed by: INTERNAL MEDICINE

## 2024-03-06 PROCEDURE — G8484 FLU IMMUNIZE NO ADMIN: HCPCS | Performed by: INTERNAL MEDICINE

## 2024-03-06 PROCEDURE — 99214 OFFICE O/P EST MOD 30 MIN: CPT | Performed by: INTERNAL MEDICINE

## 2024-03-06 PROCEDURE — 3075F SYST BP GE 130 - 139MM HG: CPT | Performed by: INTERNAL MEDICINE

## 2024-03-06 PROCEDURE — 3079F DIAST BP 80-89 MM HG: CPT | Performed by: INTERNAL MEDICINE

## 2024-03-06 PROCEDURE — G8427 DOCREV CUR MEDS BY ELIG CLIN: HCPCS | Performed by: INTERNAL MEDICINE

## 2024-03-06 PROCEDURE — G0439 PPPS, SUBSEQ VISIT: HCPCS | Performed by: INTERNAL MEDICINE

## 2024-03-06 PROCEDURE — 3023F SPIROM DOC REV: CPT | Performed by: INTERNAL MEDICINE

## 2024-03-06 PROCEDURE — 1036F TOBACCO NON-USER: CPT | Performed by: INTERNAL MEDICINE

## 2024-03-06 ASSESSMENT — LIFESTYLE VARIABLES
HOW OFTEN DO YOU HAVE A DRINK CONTAINING ALCOHOL: NEVER
HOW MANY STANDARD DRINKS CONTAINING ALCOHOL DO YOU HAVE ON A TYPICAL DAY: PATIENT DOES NOT DRINK

## 2024-03-06 ASSESSMENT — PATIENT HEALTH QUESTIONNAIRE - PHQ9
1. LITTLE INTEREST OR PLEASURE IN DOING THINGS: 0
SUM OF ALL RESPONSES TO PHQ QUESTIONS 1-9: 0
SUM OF ALL RESPONSES TO PHQ9 QUESTIONS 1 & 2: 0
SUM OF ALL RESPONSES TO PHQ QUESTIONS 1-9: 0
2. FEELING DOWN, DEPRESSED OR HOPELESS: 0

## 2024-03-06 NOTE — PATIENT INSTRUCTIONS
Personalized Preventive Plan for Rohith Desouza - 3/6/2024  Medicare offers a range of preventive health benefits. Some of the tests and screenings are paid in full while other may be subject to a deductible, co-insurance, and/or copay.    Some of these benefits include a comprehensive review of your medical history including lifestyle, illnesses that may run in your family, and various assessments and screenings as appropriate.    After reviewing your medical record and screening and assessments performed today your provider may have ordered immunizations, labs, imaging, and/or referrals for you.  A list of these orders (if applicable) as well as your Preventive Care list are included within your After Visit Summary for your review.    Other Preventive Recommendations:    A preventive eye exam performed by an eye specialist is recommended every 1-2 years to screen for glaucoma; cataracts, macular degeneration, and other eye disorders.  A preventive dental visit is recommended every 6 months.  Try to get at least 150 minutes of exercise per week or 10,000 steps per day on a pedometer .  Order or download the FREE \"Exercise & Physical Activity: Your Everyday Guide\" from [employer] [place] [employer]. Call 1-742.732.6704 or search [employer] [place] [employer] online.  You need 1337-5023 mg of calcium and 8406-1515 IU of vitamin D per day. It is possible to meet your calcium requirement with diet alone, but a vitamin D supplement is usually necessary to meet this goal.  When exposed to the sun, use a sunscreen that protects against both UVA and UVB radiation with an SPF of 30 or greater. Reapply every 2 to 3 hours or after sweating, drying off with a towel, or swimming.  Always wear a seat belt when traveling in a car. Always wear a helmet when riding a bicycle or motorcycle.    Patient Instructions  To have your hearing tested, call:    [facility], [address], [address], [place],

## 2024-03-06 NOTE — PROGRESS NOTES
Rohith Desouza  Identified pt with two pt identifiers(name and ).  Chief Complaint   Patient presents with    Medicare AWV       1. Have you been to the ER, urgent care clinic since your last visit?  Hospitalized since your last visit? NO    2. Have you seen or consulted any other health care providers outside of the [facility] since your last visit?  Include any pap smears or colon screening. NO      Provider notified of reason for visit, vitals and flowsheets obtained on patients.     Patient received paperwork for advance directive during previous visit but has not completed at this time     Reviewed record In preparation for visit, huddled with provider and have obtained necessary documentation      Health Maintenance Due   Topic    Annual Wellness Visit (Medicare)        Wt Readings from Last 3 Encounters:   24 75.3 kg (166 lb)   23 75 kg (165 lb 6.4 oz)   23 74.9 kg (165 lb 3.2 oz)     Temp Readings from Last 3 Encounters:   24 98.1 °F (36.7 °C) (Oral)   23 97.7 °F (36.5 °C) (Oral)   23 98.7 °F (37.1 °C) (Oral)     BP Readings from Last 3 Encounters:   24 (!) 145/88   23 128/85   23 113/76     Pulse Readings from Last 3 Encounters:   24 64   23 68   23 66          No data to display                  Learning Assessment:  :         2023     8:10 AM   [facility] AMB LEARNING ASSESSMENT   Primary Learner Patient   level of education GRADUATED HIGH SCHOOL OR GED   Primary Language ENGLISH   Learning Preference LISTENING    READING   Answered By patient   Relationship to Learner SELF       Fall Risk Assessment:  :         3/6/2024     7:56 AM 2023     7:54 AM 3/1/2023     8:43 AM 2021     7:58 AM 2021     2:00 PM   Amb Fall Risk Assessment and TUG Test   Do you feel unsteady or are you worried about falling?  no no      2 or more falls in past year? no no      Fall with injury in past year? no no      Fall in past 
and/or referrals ordered.                  Hearing Screen:  Do you or your family notice any trouble with your hearing that hasn't been managed with hearing aids?: (!) Yes    Interventions:  Referred to Audiology               Objective   Vitals:    03/06/24 0800 03/06/24 0804 03/06/24 0829   BP: (!) 149/92 (!) 145/88 139/84   Site: Left Upper Arm  Left Upper Arm   Position: Sitting  Sitting   Cuff Size:   Large Adult   Pulse: 64     Resp: 16     Temp: 98.1 °F (36.7 °C)     TempSrc: Oral     SpO2: 97%     Weight: 75.3 kg (166 lb)     Height: 1.778 m (5' 10\")        Body mass index is 23.82 kg/m².        General: Well-developed and well-nourished, no distress.  HEENT:  Head normocephalic/atraumatic, no scleral icterus  Neck: Supple. No carotid bruits, JVD, lymphadenopathy, or thyromegaly.  Lungs:  Clear to auscultation bilaterally. Good air movement.  Heart:  Regular rate and rhythm, normal S1 and S2, no murmur, gallop, or rub  Back: Normal ROM. No spinal or paraspinal tenderness. Mildly positive R SLR.  Extremities: No clubbing, cyanosis, or edema. 2+ pedal pulses bilaterally.  Neurological: Alert and oriented.   Psychiatric: Normal mood and affect. Behavior is normal.          Allergies   Allergen Reactions    Nitrofurantoin      Other reaction(s): Unknown (comments)    Tetracycline      Other reaction(s): Unknown (comments)     Prior to Visit Medications    Medication Sig Taking? Authorizing Provider   clopidogrel (PLAVIX) 75 MG tablet TAKE 1 TABLET DAILY Yes Kassie Das MD   tadalafil (CIALIS) 5 MG tablet TAKE 1 TABLET BY MOUTH EVERY DAY Yes Martín De La Paz PA-C   lisinopril (PRINIVIL;ZESTRIL) 20 MG tablet TAKE 1 TABLET DAILY Yes Kassie Das MD   albuterol sulfate HFA (PROVENTIL;VENTOLIN;PROAIR) 108 (90 Base) MCG/ACT inhaler Inhale 2 puffs into the lungs every 4 hours as needed Yes Automatic Reconciliation, Ar   atorvastatin (LIPITOR) 40 MG tablet TAKE 1 TABLET NIGHTLY Yes Automatic Reconciliation, Ar

## 2024-05-14 ENCOUNTER — OFFICE VISIT (OUTPATIENT)
Facility: CLINIC | Age: 79
End: 2024-05-14
Payer: MEDICARE

## 2024-05-14 VITALS
TEMPERATURE: 98 F | HEIGHT: 70 IN | RESPIRATION RATE: 16 BRPM | OXYGEN SATURATION: 95 % | BODY MASS INDEX: 23.37 KG/M2 | WEIGHT: 163.2 LBS | DIASTOLIC BLOOD PRESSURE: 81 MMHG | SYSTOLIC BLOOD PRESSURE: 118 MMHG | HEART RATE: 66 BPM

## 2024-05-14 DIAGNOSIS — J45.909 ACUTE ASTHMATIC BRONCHITIS: Primary | ICD-10-CM

## 2024-05-14 PROCEDURE — 1123F ACP DISCUSS/DSCN MKR DOCD: CPT | Performed by: INTERNAL MEDICINE

## 2024-05-14 PROCEDURE — 1036F TOBACCO NON-USER: CPT | Performed by: INTERNAL MEDICINE

## 2024-05-14 PROCEDURE — G8427 DOCREV CUR MEDS BY ELIG CLIN: HCPCS | Performed by: INTERNAL MEDICINE

## 2024-05-14 PROCEDURE — G8420 CALC BMI NORM PARAMETERS: HCPCS | Performed by: INTERNAL MEDICINE

## 2024-05-14 PROCEDURE — 3074F SYST BP LT 130 MM HG: CPT | Performed by: INTERNAL MEDICINE

## 2024-05-14 PROCEDURE — 3079F DIAST BP 80-89 MM HG: CPT | Performed by: INTERNAL MEDICINE

## 2024-05-14 PROCEDURE — 99213 OFFICE O/P EST LOW 20 MIN: CPT | Performed by: INTERNAL MEDICINE

## 2024-05-14 RX ORDER — PREDNISONE 10 MG/1
TABLET ORAL
Qty: 48 EACH | Refills: 0 | Status: SHIPPED | OUTPATIENT
Start: 2024-05-14

## 2024-05-14 RX ORDER — BUDESONIDE AND FORMOTEROL FUMARATE DIHYDRATE 160; 4.5 UG/1; UG/1
2 AEROSOL RESPIRATORY (INHALATION) 2 TIMES DAILY PRN
Qty: 10.2 G | Refills: 0 | Status: SHIPPED | OUTPATIENT
Start: 2024-05-14

## 2024-05-14 NOTE — PROGRESS NOTES
Rohith Desouza  Identified pt with two pt identifiers(name and ).  Chief Complaint   Patient presents with    Shortness of Breath       1. Have you been to the ER, urgent care clinic since your last visit?  Hospitalized since your last visit? [facility] 3 weeks ago for Bronchitis.     2. Have you seen or consulted any other health care providers outside of the [facility] since your last visit?  Include any pap smears or colon screening. NO      Provider notified of reason for visit, vitals and flowsheets obtained on patients.     Patient received paperwork for advance directive during previous visit but has not completed at this time     Reviewed record In preparation for visit, huddled with provider and have obtained necessary documentation      There are no preventive care reminders to display for this patient.    Wt Readings from Last 3 Encounters:   24 74 kg (163 lb 3.2 oz)   24 75.3 kg (166 lb)   23 75 kg (165 lb 6.4 oz)     Temp Readings from Last 3 Encounters:   24 98 °F (36.7 °C) (Oral)   24 98.1 °F (36.7 °C) (Oral)   23 97.7 °F (36.5 °C) (Oral)     BP Readings from Last 3 Encounters:   24 118/81   24 139/84   23 128/85     Pulse Readings from Last 3 Encounters:   24 66   24 64   23 68          No data to display                  Learning Assessment:  :         2023     8:10 AM   [facility] AMB LEARNING ASSESSMENT   Primary Learner Patient   level of education GRADUATED HIGH SCHOOL OR GED   Primary Language ENGLISH   Learning Preference LISTENING    READING   Answered By patient   Relationship to Learner SELF       Fall Risk Assessment:  :         3/6/2024     7:56 AM 2023     7:54 AM 3/1/2023     8:43 AM 2021     7:58 AM 2021     2:00 PM   Amb Fall Risk Assessment and TUG Test   Do you feel unsteady or are you worried about falling?  no no      2 or more falls in past year? no no      Fall with injury in

## 2024-05-14 NOTE — PATIENT INSTRUCTIONS
Patient Instructions  Take prednisone for 12 days following package instructions.  Take Claritin, Zyrtec, or Allergy (or store equivalent) once daily for 14 days then as needed.  Instead of albuterol inhaler, use Symbicort inhaler 2 puffs twice a day as needed for wheezing or shortness of breath.

## 2024-05-14 NOTE — PROGRESS NOTES
Chief Complaint   Patient presents with    Shortness of Breath     History of Present Illness  The patient is a 78-year-old male who presents for evaluation of shortness of breath. He is accompanied by his wife.    The patient reports an exacerbation of his asthma symptoms the first week of April, characterized by dyspnea during physical activities such as ascending and descending stairs. He was evaluated at [facility]. His influenza test was negative. The doctor, Dr. Baron, prescribed a 6-day course of steroids, which provided significant relief.  However, following the completion of the steroid course, his dyspnea recurred within a few days.     Over the past week, he has developed worsened dyspnea, wheezing, postnasal drainage, cough occasionally productive of clear sputum, mild chest congestion, and fatigue. He denies the presence of fevers, chills, chest pain, sore throat, hemoptysis, or sinus congestion. His dyspnea is exacerbated by physical activity. However, he maintains an active lifestyle, including two 20-minute walks yesterday and 20 minutes today. He uses albuterol as needed, but does not like to use it much because it causes heart palpitations. The patient is not currently on any allergy medications.     Patient Active Problem List   Diagnosis    Hypercholesterolemia    ED (erectile dysfunction)    Bilateral carotid artery stenosis    Disturbance of memory    Hypertension, essential    COPD, moderate (HCC)    Prediabetes    B12 deficiency    History of TIA (transient ischemic attack)    Actinic keratosis    S/P colonoscopy    Basal cell carcinoma (BCC) of skin of face     Past Medical History:   Diagnosis Date    Arthritis     knee    Cancer (HCC)     skin    Chronic obstructive pulmonary disease (HCC)     Hypercholesterolemia     Hypertension     Smoking 1/22/2010    Stopped 04/2000      Allergies   Allergen Reactions    Nitrofurantoin      Other reaction(s): Unknown (comments)    Tetracycline

## 2024-06-11 ENCOUNTER — HOSPITAL ENCOUNTER (OUTPATIENT)
Facility: HOSPITAL | Age: 79
Discharge: HOME | End: 2024-06-14
Payer: MEDICARE

## 2024-06-11 ENCOUNTER — OFFICE VISIT (OUTPATIENT)
Facility: CLINIC | Age: 79
End: 2024-06-11
Payer: MEDICARE

## 2024-06-11 VITALS
SYSTOLIC BLOOD PRESSURE: 120 MMHG | DIASTOLIC BLOOD PRESSURE: 72 MMHG | HEART RATE: 52 BPM | OXYGEN SATURATION: 95 % | RESPIRATION RATE: 16 BRPM | HEIGHT: 70 IN | WEIGHT: 160 LBS | BODY MASS INDEX: 22.9 KG/M2 | TEMPERATURE: 98.3 F

## 2024-06-11 DIAGNOSIS — J44.1 COPD EXACERBATION (HCC): Primary | ICD-10-CM

## 2024-06-11 DIAGNOSIS — J45.909 ACUTE ASTHMATIC BRONCHITIS: ICD-10-CM

## 2024-06-11 DIAGNOSIS — R05.2 SUBACUTE COUGH: ICD-10-CM

## 2024-06-11 DIAGNOSIS — J44.1 COPD EXACERBATION (HCC): ICD-10-CM

## 2024-06-11 DIAGNOSIS — R06.02 SHORTNESS OF BREATH: ICD-10-CM

## 2024-06-11 DIAGNOSIS — J45.909 ACUTE ASTHMATIC BRONCHITIS: Primary | ICD-10-CM

## 2024-06-11 PROCEDURE — 1123F ACP DISCUSS/DSCN MKR DOCD: CPT | Performed by: INTERNAL MEDICINE

## 2024-06-11 PROCEDURE — 3023F SPIROM DOC REV: CPT | Performed by: INTERNAL MEDICINE

## 2024-06-11 PROCEDURE — G8420 CALC BMI NORM PARAMETERS: HCPCS | Performed by: INTERNAL MEDICINE

## 2024-06-11 PROCEDURE — G8427 DOCREV CUR MEDS BY ELIG CLIN: HCPCS | Performed by: INTERNAL MEDICINE

## 2024-06-11 PROCEDURE — 99214 OFFICE O/P EST MOD 30 MIN: CPT | Performed by: INTERNAL MEDICINE

## 2024-06-11 PROCEDURE — 1036F TOBACCO NON-USER: CPT | Performed by: INTERNAL MEDICINE

## 2024-06-11 PROCEDURE — 3074F SYST BP LT 130 MM HG: CPT | Performed by: INTERNAL MEDICINE

## 2024-06-11 PROCEDURE — 3078F DIAST BP <80 MM HG: CPT | Performed by: INTERNAL MEDICINE

## 2024-06-11 PROCEDURE — 71046 X-RAY EXAM CHEST 2 VIEWS: CPT

## 2024-06-11 RX ORDER — PROMETHAZINE HYDROCHLORIDE AND CODEINE PHOSPHATE 6.25; 1 MG/5ML; MG/5ML
5 SYRUP ORAL EVERY 4 HOURS PRN
Qty: 180 ML | Refills: 0 | Status: SHIPPED | OUTPATIENT
Start: 2024-06-11 | End: 2024-06-17

## 2024-06-11 RX ORDER — PREDNISONE 10 MG/1
TABLET ORAL
Qty: 42 TABLET | Refills: 0 | Status: SHIPPED | OUTPATIENT
Start: 2024-06-11

## 2024-06-11 RX ORDER — AMOXICILLIN AND CLAVULANATE POTASSIUM 875; 125 MG/1; MG/1
1 TABLET, FILM COATED ORAL 2 TIMES DAILY
Qty: 20 TABLET | Refills: 0 | Status: SHIPPED | OUTPATIENT
Start: 2024-06-11 | End: 2024-06-21

## 2024-06-11 SDOH — ECONOMIC STABILITY: INCOME INSECURITY: HOW HARD IS IT FOR YOU TO PAY FOR THE VERY BASICS LIKE FOOD, HOUSING, MEDICAL CARE, AND HEATING?: NOT HARD AT ALL

## 2024-06-11 SDOH — ECONOMIC STABILITY: FOOD INSECURITY: WITHIN THE PAST 12 MONTHS, THE FOOD YOU BOUGHT JUST DIDN'T LAST AND YOU DIDN'T HAVE MONEY TO GET MORE.: NEVER TRUE

## 2024-06-11 SDOH — ECONOMIC STABILITY: FOOD INSECURITY: WITHIN THE PAST 12 MONTHS, YOU WORRIED THAT YOUR FOOD WOULD RUN OUT BEFORE YOU GOT MONEY TO BUY MORE.: NEVER TRUE

## 2024-06-11 NOTE — PROGRESS NOTES
Chief Complaint   Patient presents with    Asthma     History of Present Illness  The patient is a 79-year-old male who presents for unimproved COPD exacerbation. He is accompanied by his wife.    The patient was last seen on 5/14/24 for acute asthmatic bronchitis and COPD exacerbation. He was prescribed a 12-day course of prednisone, which he found beneficial. However, upon discontinuation of prednisone, he experienced the return of his symptoms.    The patient reports cough productive of green phlegm, chest congestion, a persistent sensation of mucus drainage at the back of his throat, shortness of breath, and wheezing. He occasionally experiences mild headaches and a mild sore throat. He denies fevers, chills, sinus pain, sinus congestion, or ear pain. He has been taking Claritin daily. He does not utilize a nasal spray.     His symptoms first started in 04/2024, at which time he was seen at [facility]. His influenza and COVID tests were negative. He was prescribed a 6-day course of steroids, which provided significant relief. However, following the completion of the steroid course, his dyspnea recurred within a few days.     He does not currently have a regular pulmonologist. He suspects that his allergies may be contributing to his symptoms. During childhood, he was diagnosed with allergies to several allergens and received allergy desensitization shots.    Patient Active Problem List   Diagnosis    Hypercholesterolemia    ED (erectile dysfunction)    Bilateral carotid artery stenosis    Disturbance of memory    Hypertension, essential    COPD, moderate (HCC)    Prediabetes    B12 deficiency    History of TIA (transient ischemic attack)    Actinic keratosis    S/P colonoscopy    Basal cell carcinoma (BCC) of skin of face     Past Medical History:   Diagnosis Date    Arthritis     knee    Cancer (HCC)     skin    Chronic obstructive pulmonary disease (HCC)     Hypercholesterolemia     Hypertension     Smoking

## 2024-06-11 NOTE — PROGRESS NOTES
Rohith Desouza  Identified pt with two pt identifiers(name and ).  Chief Complaint   Patient presents with    Asthma       1. Have you been to the ER, urgent care clinic since your last visit?  Hospitalized since your last visit? NO    2. Have you seen or consulted any other health care providers outside of the [facility] since your last visit?  Include any pap smears or colon screening. NO      Provider notified of reason for visit, vitals and flowsheets obtained on patients.     Patient received paperwork for advance directive during previous visit but has not completed at this time     Reviewed record In preparation for visit, huddled with provider and have obtained necessary documentation      Health Maintenance Due   Topic    Shingles vaccine (2 of 3)       Wt Readings from Last 3 Encounters:   24 72.6 kg (160 lb)   24 74 kg (163 lb 3.2 oz)   24 75.3 kg (166 lb)     Temp Readings from Last 3 Encounters:   24 98.3 °F (36.8 °C) (Oral)   24 98 °F (36.7 °C) (Oral)   24 98.1 °F (36.7 °C) (Oral)     BP Readings from Last 3 Encounters:   24 120/72   24 118/81   24 139/84     Pulse Readings from Last 3 Encounters:   24 52   24 66   24 64          No data to display                  Learning Assessment:  :         2023     8:10 AM   [facility] AMB LEARNING ASSESSMENT   Primary Learner Patient   level of education GRADUATED HIGH SCHOOL OR GED   Primary Language ENGLISH   Learning Preference LISTENING    READING   Answered By patient   Relationship to Learner SELF       Fall Risk Assessment:  :         3/6/2024     7:56 AM 2023     7:54 AM 3/1/2023     8:43 AM 2021     7:58 AM 2021     2:00 PM   Amb Fall Risk Assessment and TUG Test   Do you feel unsteady or are you worried about falling?  no no      2 or more falls in past year? no no      Fall with injury in past year? no no      Fall in past 12 months?   0 0 0   Able

## 2024-06-13 ENCOUNTER — TELEPHONE (OUTPATIENT)
Facility: CLINIC | Age: 79
End: 2024-06-13

## 2024-06-13 DIAGNOSIS — J44.1 COPD EXACERBATION (HCC): Primary | ICD-10-CM

## 2024-06-13 RX ORDER — DEXTROMETHORPHAN HYDROBROMIDE AND PROMETHAZINE HYDROCHLORIDE 15; 6.25 MG/5ML; MG/5ML
5 SYRUP ORAL 4 TIMES DAILY PRN
Qty: 180 ML | Refills: 0 | Status: SHIPPED | OUTPATIENT
Start: 2024-06-13 | End: 2024-06-22

## 2024-06-13 NOTE — TELEPHONE ENCOUNTER
Pt [facility] pharmacy sent refill request for Promethazine codeine solution alternative.    [facility] no longer carries Promethazine with codeine at this time.  [facility] requests a alternative medication.

## 2024-06-17 ENCOUNTER — HOSPITAL ENCOUNTER (OUTPATIENT)
Facility: HOSPITAL | Age: 79
Discharge: HOME | End: 2024-06-20
Payer: MEDICARE

## 2024-06-17 DIAGNOSIS — R05.2 SUBACUTE COUGH: ICD-10-CM

## 2024-06-17 DIAGNOSIS — R06.02 SHORTNESS OF BREATH: ICD-10-CM

## 2024-06-17 DIAGNOSIS — J44.1 COPD EXACERBATION (HCC): ICD-10-CM

## 2024-06-17 LAB — CREAT BLD-MCNC: 1.1 MG/DL (ref 0.6–1.3)

## 2024-06-17 PROCEDURE — 82565 ASSAY OF CREATININE: CPT

## 2024-06-17 PROCEDURE — 6360000004 HC RX CONTRAST MEDICATION: Performed by: INTERNAL MEDICINE

## 2024-06-17 PROCEDURE — 71260 CT THORAX DX C+: CPT

## 2024-06-17 RX ADMIN — IOPAMIDOL 100 ML: 612 INJECTION, SOLUTION INTRAVENOUS at 12:44

## 2024-06-25 RX ORDER — ATORVASTATIN CALCIUM 40 MG/1
TABLET, FILM COATED ORAL
Qty: 90 TABLET | Refills: 1 | Status: SHIPPED | OUTPATIENT
Start: 2024-06-25

## 2024-06-25 NOTE — TELEPHONE ENCOUNTER
PCP: Kassie Das MD     Last appt:  6/11/2024      Future Appointments   Date Time Provider Department Center   7/9/2024  7:50 AM Kassie Das MD [facility] BS AMB          Requested Prescriptions     Pending Prescriptions Disp Refills    atorvastatin (LIPITOR) 40 MG tablet [Pharmacy Med Name: ATORVASTATIN TABS 40MG] 90 tablet 3     Sig: TAKE 1 TABLET NIGHTLY

## 2024-07-08 RX ORDER — LISINOPRIL 20 MG/1
TABLET ORAL
Qty: 90 TABLET | Refills: 3 | Status: SHIPPED | OUTPATIENT
Start: 2024-07-08

## 2024-07-08 NOTE — TELEPHONE ENCOUNTER
PCP: Kassie Das MD     Last appt:  6/11/2024      Future Appointments   Date Time Provider Department Center   7/9/2024  7:50 AM Kassie Das MD [facility] AMB          Requested Prescriptions     Pending Prescriptions Disp Refills    lisinopril (PRINIVIL;ZESTRIL) 20 MG tablet [Pharmacy Med Name: LISINOPRIL TABS 20MG] 90 tablet 3     Sig: TAKE 1 TABLET DAILY

## 2024-07-09 ENCOUNTER — OFFICE VISIT (OUTPATIENT)
Facility: CLINIC | Age: 79
End: 2024-07-09
Payer: MEDICARE

## 2024-07-09 VITALS
RESPIRATION RATE: 16 BRPM | HEART RATE: 63 BPM | HEIGHT: 70 IN | WEIGHT: 159.4 LBS | BODY MASS INDEX: 22.82 KG/M2 | TEMPERATURE: 97.7 F | SYSTOLIC BLOOD PRESSURE: 136 MMHG | DIASTOLIC BLOOD PRESSURE: 88 MMHG | OXYGEN SATURATION: 95 %

## 2024-07-09 DIAGNOSIS — R91.1 NODULE OF UPPER LOBE OF RIGHT LUNG: ICD-10-CM

## 2024-07-09 DIAGNOSIS — J44.9 COPD, MODERATE (HCC): ICD-10-CM

## 2024-07-09 DIAGNOSIS — N52.9 ERECTILE DYSFUNCTION, UNSPECIFIED ERECTILE DYSFUNCTION TYPE: ICD-10-CM

## 2024-07-09 DIAGNOSIS — I10 HYPERTENSION, ESSENTIAL: Primary | ICD-10-CM

## 2024-07-09 PROCEDURE — 3075F SYST BP GE 130 - 139MM HG: CPT | Performed by: INTERNAL MEDICINE

## 2024-07-09 PROCEDURE — 1036F TOBACCO NON-USER: CPT | Performed by: INTERNAL MEDICINE

## 2024-07-09 PROCEDURE — G8427 DOCREV CUR MEDS BY ELIG CLIN: HCPCS | Performed by: INTERNAL MEDICINE

## 2024-07-09 PROCEDURE — 3023F SPIROM DOC REV: CPT | Performed by: INTERNAL MEDICINE

## 2024-07-09 PROCEDURE — 3079F DIAST BP 80-89 MM HG: CPT | Performed by: INTERNAL MEDICINE

## 2024-07-09 PROCEDURE — G8420 CALC BMI NORM PARAMETERS: HCPCS | Performed by: INTERNAL MEDICINE

## 2024-07-09 PROCEDURE — 1123F ACP DISCUSS/DSCN MKR DOCD: CPT | Performed by: INTERNAL MEDICINE

## 2024-07-09 PROCEDURE — 99214 OFFICE O/P EST MOD 30 MIN: CPT | Performed by: INTERNAL MEDICINE

## 2024-07-09 RX ORDER — SILDENAFIL 50 MG/1
50 TABLET, FILM COATED ORAL DAILY PRN
Qty: 10 TABLET | Refills: 0 | Status: SHIPPED | OUTPATIENT
Start: 2024-07-09

## 2024-07-09 NOTE — PROGRESS NOTES
Rohith Desouza  Identified pt with two pt identifiers(name and ).  Chief Complaint   Patient presents with    Hypertension       1. Have you been to the ER, urgent care clinic since your last visit?  Hospitalized since your last visit? NO    2. Have you seen or consulted any other health care providers outside of the [facility] since your last visit?  Include any pap smears or colon screening. NO      Provider notified of reason for visit, vitals and flowsheets obtained on patients.     Patient received paperwork for advance directive during previous visit but has not completed at this time     Reviewed record In preparation for visit, huddled with provider and have obtained necessary documentation      Health Maintenance Due   Topic    Shingles vaccine (2 of 3)       Wt Readings from Last 3 Encounters:   24 72.3 kg (159 lb 6.4 oz)   24 72.6 kg (160 lb)   24 74 kg (163 lb 3.2 oz)     Temp Readings from Last 3 Encounters:   24 97.7 °F (36.5 °C) (Oral)   24 98.3 °F (36.8 °C) (Oral)   24 98 °F (36.7 °C) (Oral)     BP Readings from Last 3 Encounters:   24 138/89   24 120/72   24 118/81     Pulse Readings from Last 3 Encounters:   24 63   24 52   24 66          No data to display                  Learning Assessment:  :         2023     8:10 AM   [facility] AMB LEARNING ASSESSMENT   Primary Learner Patient   level of education GRADUATED HIGH SCHOOL OR GED   Primary Language ENGLISH   Learning Preference LISTENING    READING   Answered By patient   Relationship to Learner SELF       Fall Risk Assessment:  :         3/6/2024     7:56 AM 2023     7:54 AM 3/1/2023     8:43 AM 2021     7:58 AM 2021     2:00 PM   Amb Fall Risk Assessment and TUG Test   Do you feel unsteady or are you worried about falling?  no no      2 or more falls in past year? no no      Fall with injury in past year? no no      Fall in past 12 months?

## 2024-07-09 NOTE — PROGRESS NOTES
Chief Complaint   Patient presents with    Hypertension       HISTORY OF PRESENT ILLNESS  Rohith Desouza is a 79 y.o. male    Presents for 4 month follow up evaluation of HTN. On 3/6/24, /88. Since previous BP readings had been normal, was continued on lisinopril 20 mg daily.     Denies headaches, CP, SOB, dizziness, heart palpitations, muscle cramps, or leg swelling. Home BP monitoring: not done. Compliant generally with low-salt diet. Drank 2 large cups of regular coffee this morning.    Occasional mild wheezing. Has upcoming pulmonary appointment with Dr. Liriano.    Has ED. Reports Cialis 5 mg did not work well for him. Wants to try a different medication.    Patient Active Problem List   Diagnosis    Hypercholesterolemia    ED (erectile dysfunction)    Bilateral carotid artery stenosis    Disturbance of memory    Hypertension, essential    COPD, moderate (HCC)    Prediabetes    B12 deficiency    History of TIA (transient ischemic attack)    Actinic keratosis    S/P colonoscopy    Basal cell carcinoma (BCC) of skin of face     Past Medical History:   Diagnosis Date    Arthritis     knee    Cancer (HCC)     skin    Chronic obstructive pulmonary disease (HCC)     Hypercholesterolemia     Hypertension     Smoking 1/22/2010    Stopped 04/2000      Allergies   Allergen Reactions    Nitrofurantoin      Other reaction(s): Unknown (comments)    Tetracycline      Other reaction(s): Unknown (comments)     Current Outpatient Medications   Medication Sig Dispense Refill    lisinopril (PRINIVIL;ZESTRIL) 20 MG tablet TAKE 1 TABLET DAILY 90 tablet 3    atorvastatin (LIPITOR) 40 MG tablet TAKE 1 TABLET NIGHTLY 90 tablet 1    budesonide-formoterol (SYMBICORT) 160-4.5 MCG/ACT AERO Inhale 2 puffs into the lungs 2 times daily as needed (Wheezing or shortness of breath) 10.2 g 0    clopidogrel (PLAVIX) 75 MG tablet TAKE 1 TABLET DAILY 90 tablet 3    albuterol sulfate HFA (PROVENTIL;VENTOLIN;PROAIR) 108 (90 Base) MCG/ACT

## 2024-08-09 DIAGNOSIS — J45.909 ACUTE ASTHMATIC BRONCHITIS: ICD-10-CM

## 2024-08-09 RX ORDER — BUDESONIDE AND FORMOTEROL FUMARATE DIHYDRATE 160; 4.5 UG/1; UG/1
2 AEROSOL RESPIRATORY (INHALATION) 2 TIMES DAILY PRN
Qty: 10.2 EACH | Refills: 5 | Status: SHIPPED | OUTPATIENT
Start: 2024-08-09

## 2024-08-09 NOTE — TELEPHONE ENCOUNTER
PCP: Kassie Das MD     Last appt:  7/9/2024      Future Appointments   Date Time Provider Department Center   11/6/2024  8:10 AM Kassie Das MD [facility]          Requested Prescriptions     Pending Prescriptions Disp Refills    budesonide-formoterol (SYMBICORT) 160-4.5 MCG/ACT AERO [Pharmacy Med Name: BUDESONIDE-FORMOTEROL 160-4.5] 10.2 each 5     Sig: Inhale 2 puffs into the lungs 2 times daily as needed (Wheezing or shortness of breath)

## 2024-10-15 ENCOUNTER — HOSPITAL ENCOUNTER (OUTPATIENT)
Facility: HOSPITAL | Age: 79
Discharge: HOME | End: 2024-10-18
Attending: INTERNAL MEDICINE
Payer: MEDICARE

## 2024-10-15 DIAGNOSIS — J44.9 COPD, MODERATE (HCC): ICD-10-CM

## 2024-10-15 DIAGNOSIS — R93.89 ABNORMAL COMPUTERIZED AXIAL TOMOGRAPHY OF CHEST: ICD-10-CM

## 2024-10-15 PROCEDURE — 71250 CT THORAX DX C-: CPT

## 2024-12-23 DIAGNOSIS — I65.23 BILATERAL CAROTID ARTERY STENOSIS: ICD-10-CM

## 2024-12-23 NOTE — TELEPHONE ENCOUNTER
Future Appointments:  No future appointments.     Last Appointment With Me:  7/9/2024     Requested Prescriptions     Pending Prescriptions Disp Refills    clopidogrel (PLAVIX) 75 MG tablet [Pharmacy Med Name: CLOPIDOGREL BISULFATE TABS 75MG] 90 tablet 3     Sig: TAKE 1 TABLET DAILY

## 2024-12-24 RX ORDER — CLOPIDOGREL BISULFATE 75 MG/1
TABLET ORAL
Qty: 90 TABLET | Refills: 3 | Status: SHIPPED | OUTPATIENT
Start: 2024-12-24

## 2025-01-10 RX ORDER — ATORVASTATIN CALCIUM 40 MG/1
TABLET, FILM COATED ORAL
Qty: 90 TABLET | Refills: 1 | Status: SHIPPED | OUTPATIENT
Start: 2025-01-10

## 2025-01-10 NOTE — TELEPHONE ENCOUNTER
PCP: Kassie Das MD     Last appt:  7/9/2024    No future appointments.       Requested Prescriptions     Pending Prescriptions Disp Refills    atorvastatin (LIPITOR) 40 MG tablet [Pharmacy Med Name: ATORVASTATIN TABS 40MG] 90 tablet 3     Sig: TAKE 1 TABLET NIGHTLY

## 2025-03-15 DIAGNOSIS — N52.9 ERECTILE DYSFUNCTION, UNSPECIFIED ERECTILE DYSFUNCTION TYPE: ICD-10-CM

## 2025-03-16 RX ORDER — SILDENAFIL 50 MG/1
TABLET, FILM COATED ORAL
Qty: 10 TABLET | Refills: 0 | Status: SHIPPED | OUTPATIENT
Start: 2025-03-16

## 2025-03-24 SDOH — ECONOMIC STABILITY: FOOD INSECURITY: WITHIN THE PAST 12 MONTHS, THE FOOD YOU BOUGHT JUST DIDN'T LAST AND YOU DIDN'T HAVE MONEY TO GET MORE.: NEVER TRUE

## 2025-03-24 SDOH — ECONOMIC STABILITY: TRANSPORTATION INSECURITY
IN THE PAST 12 MONTHS, HAS THE LACK OF TRANSPORTATION KEPT YOU FROM MEDICAL APPOINTMENTS OR FROM GETTING MEDICATIONS?: NO

## 2025-03-24 SDOH — ECONOMIC STABILITY: INCOME INSECURITY: IN THE LAST 12 MONTHS, WAS THERE A TIME WHEN YOU WERE NOT ABLE TO PAY THE MORTGAGE OR RENT ON TIME?: NO

## 2025-03-24 SDOH — HEALTH STABILITY: PHYSICAL HEALTH: ON AVERAGE, HOW MANY MINUTES DO YOU ENGAGE IN EXERCISE AT THIS LEVEL?: 30 MIN

## 2025-03-24 SDOH — ECONOMIC STABILITY: FOOD INSECURITY: WITHIN THE PAST 12 MONTHS, YOU WORRIED THAT YOUR FOOD WOULD RUN OUT BEFORE YOU GOT MONEY TO BUY MORE.: NEVER TRUE

## 2025-03-24 SDOH — HEALTH STABILITY: PHYSICAL HEALTH: ON AVERAGE, HOW MANY DAYS PER WEEK DO YOU ENGAGE IN MODERATE TO STRENUOUS EXERCISE (LIKE A BRISK WALK)?: 5 DAYS

## 2025-03-24 SDOH — ECONOMIC STABILITY: TRANSPORTATION INSECURITY
IN THE PAST 12 MONTHS, HAS LACK OF TRANSPORTATION KEPT YOU FROM MEETINGS, WORK, OR FROM GETTING THINGS NEEDED FOR DAILY LIVING?: NO

## 2025-03-24 ASSESSMENT — PATIENT HEALTH QUESTIONNAIRE - PHQ9
SUM OF ALL RESPONSES TO PHQ QUESTIONS 1-9: 0
2. FEELING DOWN, DEPRESSED OR HOPELESS: NOT AT ALL
SUM OF ALL RESPONSES TO PHQ QUESTIONS 1-9: 0
1. LITTLE INTEREST OR PLEASURE IN DOING THINGS: NOT AT ALL

## 2025-03-24 ASSESSMENT — LIFESTYLE VARIABLES
HOW OFTEN DO YOU HAVE A DRINK CONTAINING ALCOHOL: NEVER
HOW OFTEN DO YOU HAVE SIX OR MORE DRINKS ON ONE OCCASION: 1
HOW OFTEN DO YOU HAVE A DRINK CONTAINING ALCOHOL: 1
HOW MANY STANDARD DRINKS CONTAINING ALCOHOL DO YOU HAVE ON A TYPICAL DAY: 0
HOW MANY STANDARD DRINKS CONTAINING ALCOHOL DO YOU HAVE ON A TYPICAL DAY: PATIENT DOES NOT DRINK

## 2025-03-27 ENCOUNTER — OFFICE VISIT (OUTPATIENT)
Facility: CLINIC | Age: 80
End: 2025-03-27

## 2025-03-27 ENCOUNTER — PATIENT MESSAGE (OUTPATIENT)
Facility: CLINIC | Age: 80
End: 2025-03-27

## 2025-03-27 VITALS
BODY MASS INDEX: 25.31 KG/M2 | RESPIRATION RATE: 16 BRPM | HEART RATE: 59 BPM | TEMPERATURE: 98 F | WEIGHT: 176.8 LBS | SYSTOLIC BLOOD PRESSURE: 136 MMHG | HEIGHT: 70 IN | DIASTOLIC BLOOD PRESSURE: 84 MMHG | OXYGEN SATURATION: 98 %

## 2025-03-27 DIAGNOSIS — E55.9 VITAMIN D DEFICIENCY: ICD-10-CM

## 2025-03-27 DIAGNOSIS — K21.9 GASTROESOPHAGEAL REFLUX DISEASE WITHOUT ESOPHAGITIS: ICD-10-CM

## 2025-03-27 DIAGNOSIS — R73.03 PREDIABETES: ICD-10-CM

## 2025-03-27 DIAGNOSIS — N52.9 ERECTILE DYSFUNCTION, UNSPECIFIED ERECTILE DYSFUNCTION TYPE: ICD-10-CM

## 2025-03-27 DIAGNOSIS — E53.8 B12 DEFICIENCY: ICD-10-CM

## 2025-03-27 DIAGNOSIS — E78.00 HYPERCHOLESTEROLEMIA: ICD-10-CM

## 2025-03-27 DIAGNOSIS — J44.9 COPD, MODERATE (HCC): Primary | ICD-10-CM

## 2025-03-27 DIAGNOSIS — Z00.00 MEDICARE ANNUAL WELLNESS VISIT, SUBSEQUENT: Primary | ICD-10-CM

## 2025-03-27 DIAGNOSIS — I10 HYPERTENSION, ESSENTIAL: ICD-10-CM

## 2025-03-27 DIAGNOSIS — J44.9 COPD, MODERATE (HCC): ICD-10-CM

## 2025-03-27 DIAGNOSIS — R53.83 FATIGUE, UNSPECIFIED TYPE: ICD-10-CM

## 2025-03-27 RX ORDER — SILDENAFIL 50 MG/1
50 TABLET, FILM COATED ORAL DAILY PRN
Qty: 10 TABLET | Refills: 3 | Status: SHIPPED | OUTPATIENT
Start: 2025-03-27

## 2025-03-27 NOTE — PROGRESS NOTES
Medicare Annual Wellness Visit    Rohith Desouza is here for Medicare AWV, Hypertension, and COPD    Assessment & Plan    ICD-10-CM    1. Medicare annual wellness visit, subsequent  Z00.00       2. Gastroesophageal reflux disease without esophagitis  K21.9       3. Fatigue, unspecified type  R53.83 Thyroid Cascade Profile      4. COPD, moderate (HCC)  J44.9       5. Hypertension, essential  I10 CBC with Auto Differential     Comprehensive Metabolic Panel      6. Erectile dysfunction, unspecified erectile dysfunction type  N52.9 sildenafil (VIAGRA) 50 MG tablet      7. Hypercholesterolemia  E78.00 Lipid Panel     Thyroid Cascade Profile      8. Prediabetes  R73.03 Hemoglobin A1C      9. B12 deficiency  E53.8 Vitamin B12 & Folate      10. Vitamin D deficiency  E55.9 Vitamin D 25 Hydroxy        1. Medicare annual wellness visit, subsequent  - Has not had hearing tested and does not use hearing aids.  - Advised to get a hearing test done at one of the recommended places.  - Routine blood work will be done today:  - CBC with Auto Differential  - Comprehensive Metabolic Panel  - Lipid Panel  - Thyroid Cascade Profile  - Hemoglobin A1C  - Vitamin B12 & Folate  - Vitamin D 25 Hydroxy    2. Indigestion: Due to GERD.  - Managed with Pepcid 10 mg once daily.  - No significant pain on abdominal examination.  - Advised to continue with Pepcid.  - Aim to lose about 5 pounds as rapid weight gain can exacerbate heartburn.    3. Fatigue: Likely due to COPD, but rule out electrolyte abnormality, anemia, thyroid disease, or vitamin deficiency.  - Reports feeling fatigued after walking about a mile each morning.  - Blood work will be conducted today to investigate potential causes of fatigue.    3. Chronic Obstructive Pulmonary Disease (COPD): Improved.  - Pulmonary symptoms have improved since seeing the pulmonologist and using a new inhaler.  - Lungs sound clear on examination, with no wheezing noted.  - Advised to continue using the

## 2025-03-27 NOTE — PATIENT INSTRUCTIONS
[employer].   Care instructions adapted under license by [facility] [facility]. If you have questions about a medical condition or this instruction, always ask your healthcare professional. [facility], [employer], disclaims any warranty or liability for your use of this information.    Personalized Preventive Plan for Rohith Desouza - 3/27/2025  Medicare offers a range of preventive health benefits. Some of the tests and screenings are paid in full while other may be subject to a deductible, co-insurance, and/or copay.  Some of these benefits include a comprehensive review of your medical history including lifestyle, illnesses that may run in your family, and various assessments and screenings as appropriate.  After reviewing your medical record and screening and assessments performed today your provider may have ordered immunizations, labs, imaging, and/or referrals for you.  A list of these orders (if applicable) as well as your Preventive Care list are included within your After Visit Summary for your review.

## 2025-03-27 NOTE — PROGRESS NOTES
Rohith Desouza  Identified pt with two pt identifiers(name and ).  Chief Complaint   Patient presents with    Medicare AWV    Hypertension    COPD       1. Have you been to the ER, urgent care clinic since your last visit?  Hospitalized since your last visit? NO    2. Have you seen or consulted any other health care providers outside of the [facility] since your last visit?  Include any pap smears or colon screening. NO      Provider notified of reason for visit, vitals and flowsheets obtained on patients.     Patient received paperwork for advance directive during previous visit but has not completed at this time     Reviewed record In preparation for visit, huddled with provider and have obtained necessary documentation      Health Maintenance Due   Topic    Annual Wellness Visit (Medicare)     Lipids        Wt Readings from Last 3 Encounters:   24 72.3 kg (159 lb 6.4 oz)   24 72.6 kg (160 lb)   24 74 kg (163 lb 3.2 oz)     Temp Readings from Last 3 Encounters:   25 98 °F (36.7 °C) (Oral)   24 97.7 °F (36.5 °C) (Oral)   24 98.3 °F (36.8 °C) (Oral)     BP Readings from Last 3 Encounters:   25 136/84   24 136/88   24 120/72     Pulse Readings from Last 3 Encounters:   25 59   24 63   24 52          No data to display                  Learning Assessment:  :         2023     8:10 AM   [facility] AMB LEARNING ASSESSMENT   Primary Learner Patient   level of education GRADUATED HIGH SCHOOL OR GED   Primary Language ENGLISH   Learning Preference LISTENING    READING   Answered By patient   Relationship to Learner SELF       Fall Risk Assessment:  :         3/24/2025     2:08 PM 3/6/2024     7:56 AM 2023     7:54 AM 3/1/2023     8:43 AM 2021     7:58 AM 2021     2:00 PM   Amb Fall Risk Assessment and TUG Test   Do you feel unsteady or are you worried about falling?  no no no      2 or more falls in past year? no no no

## 2025-03-28 LAB
25(OH)D3 SERPL-MCNC: 39.2 NG/ML (ref 30–100)
ALBUMIN SERPL-MCNC: 4 G/DL (ref 3.5–5)
ALBUMIN/GLOB SERPL: 1.3 (ref 1.1–2.2)
ALP SERPL-CCNC: 161 U/L (ref 45–117)
ALT SERPL-CCNC: 29 U/L (ref 12–78)
ANION GAP SERPL CALC-SCNC: 5 MMOL/L (ref 2–12)
AST SERPL-CCNC: 25 U/L (ref 15–37)
BASOPHILS # BLD: 0.08 K/UL (ref 0–0.1)
BASOPHILS NFR BLD: 0.9 % (ref 0–1)
BILIRUB SERPL-MCNC: 0.5 MG/DL (ref 0.2–1)
BUN SERPL-MCNC: 16 MG/DL (ref 6–20)
BUN/CREAT SERPL: 15 (ref 12–20)
CALCIUM SERPL-MCNC: 9.9 MG/DL (ref 8.5–10.1)
CHLORIDE SERPL-SCNC: 107 MMOL/L (ref 97–108)
CHOLEST SERPL-MCNC: 127 MG/DL
CO2 SERPL-SCNC: 26 MMOL/L (ref 21–32)
CREAT SERPL-MCNC: 1.08 MG/DL (ref 0.7–1.3)
DIFFERENTIAL METHOD BLD: ABNORMAL
EOSINOPHIL # BLD: 0.45 K/UL (ref 0–0.4)
EOSINOPHIL NFR BLD: 5.3 % (ref 0–7)
ERYTHROCYTE [DISTWIDTH] IN BLOOD BY AUTOMATED COUNT: 13.8 % (ref 11.5–14.5)
EST. AVERAGE GLUCOSE BLD GHB EST-MCNC: 126 MG/DL
FOLATE SERPL-MCNC: 23.3 NG/ML (ref 5–21)
GLOBULIN SER CALC-MCNC: 3.2 G/DL (ref 2–4)
GLUCOSE SERPL-MCNC: 92 MG/DL (ref 65–100)
HBA1C MFR BLD: 6 % (ref 4–5.6)
HCT VFR BLD AUTO: 45.8 % (ref 36.6–50.3)
HDLC SERPL-MCNC: 54 MG/DL
HDLC SERPL: 2.4 (ref 0–5)
HGB BLD-MCNC: 15 G/DL (ref 12.1–17)
IMM GRANULOCYTES # BLD AUTO: 0.02 K/UL (ref 0–0.04)
IMM GRANULOCYTES NFR BLD AUTO: 0.2 % (ref 0–0.5)
LDLC SERPL CALC-MCNC: 55.2 MG/DL (ref 0–100)
LYMPHOCYTES # BLD: 1.4 K/UL (ref 0.8–3.5)
LYMPHOCYTES NFR BLD: 16.5 % (ref 12–49)
MCH RBC QN AUTO: 30.5 PG (ref 26–34)
MCHC RBC AUTO-ENTMCNC: 32.8 G/DL (ref 30–36.5)
MCV RBC AUTO: 93.3 FL (ref 80–99)
MONOCYTES # BLD: 0.78 K/UL (ref 0–1)
MONOCYTES NFR BLD: 9.2 % (ref 5–13)
NEUTS SEG # BLD: 5.76 K/UL (ref 1.8–8)
NEUTS SEG NFR BLD: 67.9 % (ref 32–75)
NRBC # BLD: 0 K/UL (ref 0–0.01)
NRBC BLD-RTO: 0 PER 100 WBC
PLATELET # BLD AUTO: 298 K/UL (ref 150–400)
PMV BLD AUTO: 9.8 FL (ref 8.9–12.9)
POTASSIUM SERPL-SCNC: 4.2 MMOL/L (ref 3.5–5.1)
PROT SERPL-MCNC: 7.2 G/DL (ref 6.4–8.2)
RBC # BLD AUTO: 4.91 M/UL (ref 4.1–5.7)
SODIUM SERPL-SCNC: 138 MMOL/L (ref 136–145)
TRIGL SERPL-MCNC: 89 MG/DL
VIT B12 SERPL-MCNC: 626 PG/ML (ref 193–986)
VLDLC SERPL CALC-MCNC: 17.8 MG/DL
WBC # BLD AUTO: 8.5 K/UL (ref 4.1–11.1)

## 2025-03-29 LAB — TSH SERPL DL<=0.05 MIU/L-ACNC: 1.63 UIU/ML (ref 0.45–4.5)

## 2025-03-30 ENCOUNTER — RESULTS FOLLOW-UP (OUTPATIENT)
Facility: CLINIC | Age: 80
End: 2025-03-30

## 2025-04-12 ENCOUNTER — HOSPITAL ENCOUNTER (EMERGENCY)
Facility: HOSPITAL | Age: 80
Discharge: HOME | End: 2025-04-12
Attending: EMERGENCY MEDICINE
Payer: MEDICARE

## 2025-04-12 VITALS
DIASTOLIC BLOOD PRESSURE: 104 MMHG | OXYGEN SATURATION: 97 % | BODY MASS INDEX: 25.09 KG/M2 | TEMPERATURE: 97.6 F | WEIGHT: 175.27 LBS | RESPIRATION RATE: 26 BRPM | SYSTOLIC BLOOD PRESSURE: 180 MMHG | HEART RATE: 75 BPM | HEIGHT: 70 IN

## 2025-04-12 DIAGNOSIS — M62.830 BACK MUSCLE SPASM: Primary | ICD-10-CM

## 2025-04-12 PROCEDURE — 6360000002 HC RX W HCPCS: Performed by: EMERGENCY MEDICINE

## 2025-04-12 PROCEDURE — 96375 TX/PRO/DX INJ NEW DRUG ADDON: CPT

## 2025-04-12 PROCEDURE — 6370000000 HC RX 637 (ALT 250 FOR IP)

## 2025-04-12 PROCEDURE — 99284 EMERGENCY DEPT VISIT MOD MDM: CPT

## 2025-04-12 PROCEDURE — 6360000002 HC RX W HCPCS

## 2025-04-12 PROCEDURE — 96374 THER/PROPH/DIAG INJ IV PUSH: CPT

## 2025-04-12 RX ORDER — KETOROLAC TROMETHAMINE 30 MG/ML
INJECTION, SOLUTION INTRAMUSCULAR; INTRAVENOUS
Status: DISPENSED
Start: 2025-04-12 | End: 2025-04-12

## 2025-04-12 RX ORDER — DIAZEPAM 10 MG/2ML
INJECTION, SOLUTION INTRAMUSCULAR; INTRAVENOUS
Status: DISPENSED
Start: 2025-04-12 | End: 2025-04-12

## 2025-04-12 RX ORDER — CYCLOBENZAPRINE HCL 5 MG
5 TABLET ORAL 3 TIMES DAILY PRN
Qty: 10 TABLET | Refills: 0 | Status: SHIPPED | OUTPATIENT
Start: 2025-04-12

## 2025-04-12 RX ORDER — FENTANYL CITRATE 50 UG/ML
25 INJECTION, SOLUTION INTRAMUSCULAR; INTRAVENOUS
Refills: 0 | Status: COMPLETED | OUTPATIENT
Start: 2025-04-12 | End: 2025-04-12

## 2025-04-12 RX ORDER — DIAZEPAM 10 MG/2ML
2 INJECTION, SOLUTION INTRAMUSCULAR; INTRAVENOUS EVERY 4 HOURS PRN
Status: DISCONTINUED | OUTPATIENT
Start: 2025-04-12 | End: 2025-04-12 | Stop reason: HOSPADM

## 2025-04-12 RX ORDER — KETOROLAC TROMETHAMINE 30 MG/ML
15 INJECTION, SOLUTION INTRAMUSCULAR; INTRAVENOUS
Status: COMPLETED | OUTPATIENT
Start: 2025-04-12 | End: 2025-04-12

## 2025-04-12 RX ADMIN — ALUMINUM HYDROXIDE AND MAGNESIUM HYDROXIDE 30 ML: 200; 200 SUSPENSION ORAL at 02:59

## 2025-04-12 RX ADMIN — KETOROLAC TROMETHAMINE 15 MG: 30 INJECTION, SOLUTION INTRAMUSCULAR at 03:00

## 2025-04-12 RX ADMIN — FENTANYL CITRATE 25 MCG: 50 INJECTION INTRAMUSCULAR; INTRAVENOUS at 03:11

## 2025-04-12 RX ADMIN — DIAZEPAM 2 MG: 5 INJECTION, SOLUTION INTRAMUSCULAR; INTRAVENOUS at 03:01

## 2025-04-12 ASSESSMENT — PAIN DESCRIPTION - FREQUENCY: FREQUENCY: INTERMITTENT

## 2025-04-12 ASSESSMENT — PAIN DESCRIPTION - PAIN TYPE: TYPE: ACUTE PAIN

## 2025-04-12 ASSESSMENT — PAIN SCALES - GENERAL
PAINLEVEL_OUTOF10: 8
PAINLEVEL_OUTOF10: 5
PAINLEVEL_OUTOF10: 7
PAINLEVEL_OUTOF10: 8

## 2025-04-12 ASSESSMENT — PAIN - FUNCTIONAL ASSESSMENT: PAIN_FUNCTIONAL_ASSESSMENT: 0-10

## 2025-04-12 NOTE — ED PROVIDER NOTES
[facility] EMERGENCY DEPARTMENT  EMERGENCY DEPARTMENT ENCOUNTER       Pt Name: Rohith Desouza  MRN: [id number]  Birthdate 1945  Date of evaluation: 4/12/2025  Provider: Lang Adams MD   PCP: Kassie Das MD  Note Started: 2:24 AM EDT 4/12/25  Attending Physician: Dr. Willoughby    CHIEF COMPLAINT       Chief Complaint   Patient presents with    Flank Pain     Right side flank pan which has been chronic over the last several months. No clear explanation of causal event; tonight has just gotten worse. (Ambulated into ED)        HISTORY OF PRESENT ILLNESS: 1 or more elements      History From: patient, wife, History limited by: none     Rohith Desouza is a 79 y.o. male with history to include prior TIA on plavix, COPD, HTN, recurrent back spasms presenting for right thoracic paraspinal pain concerning for back spasm. Last back spasm in fall of 2024 treated with muscle relxanats prescribed from [facility]. Reports current symptoms begant 7-10 days ago in the setting of being cold and wet outside triggering shivering that resulted in spasm. Denies falls or trauma. Denies fevers, chest pain, dyspnea, dysuria, frequency, incontinence or retention, focal neuro deficit.        Please See MDM for Additional Details of the HPI/PMH  Nursing Notes were all reviewed and agreed with or any disagreements were addressed in the HPI.     REVIEW OF SYSTEMS        Positives and Pertinent negatives as per HPI.    PAST HISTORY     Past Medical History:  Past Medical History:   Diagnosis Date    Arthritis     knee    Cancer (HCC)     skin    Chronic obstructive pulmonary disease (HCC)     Hypercholesterolemia     Hypertension     Smoking 1/22/2010    Stopped 04/2000        Past Surgical History:  Past Surgical History:   Procedure Laterality Date    COLONOSCOPY  07/16/2007    Dr Rodrigez polyp repeat 07/2010    COLORECTAL SCRN; HI RISK IND  01/12/2016    Dr. Rodrigez. Diverticulosis, int hemorrhoids. Repeat in 5 yrs.          SpO2       Weight       Height       Head Circumference       Peak Flow       Pain Score       Pain Loc       Pain Education       Exclude from Growth Chart         Physical Exam  Vitals and nursing note reviewed.   Constitutional:       Appearance: Normal appearance.   HENT:      Head: Normocephalic and atraumatic.   Eyes:      General: No scleral icterus.     Pupils: Pupils are equal, round, and reactive to light.   Cardiovascular:      Rate and Rhythm: Normal rate.      Pulses: Normal pulses.   Pulmonary:      Effort: Pulmonary effort is normal. No respiratory distress.   Abdominal:      General: There is no distension.      Palpations: Abdomen is soft.      Tenderness: There is no abdominal tenderness. There is no guarding.   Musculoskeletal:         General: Tenderness present.      Cervical back: Normal range of motion.      Comments: No tenderness or deformities of the C, T, or L-spine.  Tenderness in the right thoracic paraspinal area without obvious deformity or trigger point   Skin:     General: Skin is warm and dry.      Findings: No bruising, erythema or rash.   Neurological:      General: No focal deficit present.      Mental Status: He is alert and oriented to person, place, and time.      Sensory: No sensory deficit.      Motor: No weakness.      Gait: Gait normal.          DIAGNOSTIC RESULTS   LABS:     No results found for this or any previous visit (from the past 24 hours).    EKG: If performed, independent interpretation documented below in the MDM section     RADIOLOGY:  Non-plain film images such as CT, Ultrasound and MRI are read by the radiologist. Plain radiographic images are visualized and preliminarily interpreted by the ED Provider with the findings documented in the MDM section.     Interpretation per the Radiologist below, if available at the time of this note:     No orders to display        PROCEDURES   Unless otherwise noted below, none  Procedures    EMERGENCY DEPARTMENT COURSE

## 2025-04-12 NOTE — ED NOTES
Handoff report to Manohar (RN) where we discussed presentation, needs and plan of care. Consulted physician who thought the patient could go home after resting for a bit

## 2025-04-12 NOTE — DISCHARGE INSTRUCTIONS
Thank you for choosing our Emergency Department for your care.  It is our privilege to care for you in your time of need.  In the next several days, you may receive a survey via email or mailed to your home about your experience with our team.  We would greatly appreciate you taking a few minutes to complete the survey, as we use this information to learn what we have done well and what we could be doing better. Thank you for trusting us with your care!    You were evaluated today for a back spasm.  Your symptoms improved with treatment with a muscle relaxant, anti-inflammatory, and narcotic pain medication.  You have been prescribed an additional muscle relaxant, cyclobenzaprine.  Please take this and all other home medications as prescribed.  Please avoid driving, operating heavy machinery, consuming alcohol, or other strenuous activity while taking this medication.  Please follow-up with your primary care provider for further evaluation.  Please return to the emergency department if develop worsening of your symptoms, numbness, weakness, fevers, chills, or other concerning symptoms you feel need emergent assessment and care.    ------------------------------------------------------------------------------------------------------------  The evaluation and treatment you received in the Emergency Department were for an urgent problem. It is important that you follow-up with a doctor, nurse practitioner, or physician assistant to:  (1) confirm your diagnosis,  (2) re-evaluation of changes in your illness and treatment, and (3) for ongoing care. Please take your discharge instructions with you when you go to your follow-up appointment.     If you have any problem arranging a follow-up appointment, contact us!  If your symptoms become worse or you do not improve as expected, please return to us. We are available 24 hours a day.     If a prescription has been provided, please fill it as soon as possible to prevent a

## 2025-04-24 ENCOUNTER — TRANSCRIBE ORDERS (OUTPATIENT)
Facility: HOSPITAL | Age: 80
End: 2025-04-24

## 2025-04-24 DIAGNOSIS — R93.89 ABNORMAL COMPUTERIZED AXIAL TOMOGRAPHY OF CHEST: Primary | ICD-10-CM

## 2025-06-30 RX ORDER — LISINOPRIL 20 MG/1
20 TABLET ORAL DAILY
Qty: 90 TABLET | Refills: 3 | Status: SHIPPED | OUTPATIENT
Start: 2025-06-30

## 2025-06-30 NOTE — TELEPHONE ENCOUNTER
Future Appointments:  Future Appointments   Date Time Provider Department Center   9/30/2025  9:50 AM Kassie Das MD [facility] DEP   10/14/2025  8:30 AM [facility]        Last Appointment With Me:  3/27/2025     Requested Prescriptions     Pending Prescriptions Disp Refills    lisinopril (PRINIVIL;ZESTRIL) 20 MG tablet [Pharmacy Med Name: LISINOPRIL TABS 20MG] 90 tablet 3     Sig: TAKE 1 TABLET DAILY

## 2025-09-02 RX ORDER — ATORVASTATIN CALCIUM 40 MG/1
40 TABLET, FILM COATED ORAL NIGHTLY
Qty: 90 TABLET | Refills: 3 | Status: SHIPPED | OUTPATIENT
Start: 2025-09-02